# Patient Record
Sex: MALE | Race: OTHER | Employment: FULL TIME
[De-identification: names, ages, dates, MRNs, and addresses within clinical notes are randomized per-mention and may not be internally consistent; named-entity substitution may affect disease eponyms.]

---

## 2017-01-10 DIAGNOSIS — F98.8 ADD (ATTENTION DEFICIT DISORDER): ICD-10-CM

## 2017-01-11 RX ORDER — DEXTROAMPHETAMINE SACCHARATE, AMPHETAMINE ASPARTATE MONOHYDRATE, DEXTROAMPHETAMINE SULFATE AND AMPHETAMINE SULFATE 5; 5; 5; 5 MG/1; MG/1; MG/1; MG/1
20 CAPSULE, EXTENDED RELEASE ORAL EVERY MORNING
Qty: 30 CAPSULE | Refills: 0 | Status: SHIPPED | OUTPATIENT
Start: 2017-01-11 | End: 2017-02-13 | Stop reason: SDUPTHER

## 2017-02-08 ENCOUNTER — OFFICE VISIT (OUTPATIENT)
Facility: CLINIC | Age: 37
End: 2017-02-08

## 2017-02-08 VITALS
DIASTOLIC BLOOD PRESSURE: 80 MMHG | OXYGEN SATURATION: 99 % | BODY MASS INDEX: 22.48 KG/M2 | SYSTOLIC BLOOD PRESSURE: 116 MMHG | TEMPERATURE: 98.7 F | HEIGHT: 69 IN | HEART RATE: 81 BPM | WEIGHT: 151.8 LBS

## 2017-02-08 DIAGNOSIS — R05.9 COUGH: ICD-10-CM

## 2017-02-08 DIAGNOSIS — R09.81 NASAL CONGESTION WITH RHINORRHEA: Primary | ICD-10-CM

## 2017-02-08 DIAGNOSIS — J34.89 NASAL CONGESTION WITH RHINORRHEA: Primary | ICD-10-CM

## 2017-02-08 PROCEDURE — G8420 CALC BMI NORM PARAMETERS: HCPCS | Performed by: PHYSICIAN ASSISTANT

## 2017-02-08 PROCEDURE — G8484 FLU IMMUNIZE NO ADMIN: HCPCS | Performed by: PHYSICIAN ASSISTANT

## 2017-02-08 PROCEDURE — 1036F TOBACCO NON-USER: CPT | Performed by: PHYSICIAN ASSISTANT

## 2017-02-08 PROCEDURE — 99213 OFFICE O/P EST LOW 20 MIN: CPT | Performed by: PHYSICIAN ASSISTANT

## 2017-02-08 PROCEDURE — G8427 DOCREV CUR MEDS BY ELIG CLIN: HCPCS | Performed by: PHYSICIAN ASSISTANT

## 2017-02-08 RX ORDER — CETIRIZINE HYDROCHLORIDE 10 MG/1
10 TABLET ORAL DAILY
Qty: 30 TABLET | Refills: 1 | Status: SHIPPED | OUTPATIENT
Start: 2017-02-08 | End: 2017-04-18 | Stop reason: SDUPTHER

## 2017-02-08 RX ORDER — FLUTICASONE PROPIONATE 50 MCG
1 SPRAY, SUSPENSION (ML) NASAL DAILY
Qty: 1 BOTTLE | Refills: 3 | Status: SHIPPED | OUTPATIENT
Start: 2017-02-08 | End: 2017-04-18 | Stop reason: SDUPTHER

## 2017-02-08 RX ORDER — BENZONATATE 100 MG/1
100 CAPSULE ORAL 3 TIMES DAILY PRN
Qty: 30 CAPSULE | Refills: 0 | Status: SHIPPED | OUTPATIENT
Start: 2017-02-08 | End: 2017-02-15

## 2017-02-08 ASSESSMENT — ENCOUNTER SYMPTOMS
SHORTNESS OF BREATH: 0
CHEST TIGHTNESS: 0
DIARRHEA: 0
PHOTOPHOBIA: 0
RHINORRHEA: 1
SORE THROAT: 0
VOMITING: 0
NAUSEA: 0
COUGH: 1

## 2017-02-13 DIAGNOSIS — F98.8 ADD (ATTENTION DEFICIT DISORDER): ICD-10-CM

## 2017-02-13 RX ORDER — DEXTROAMPHETAMINE SACCHARATE, AMPHETAMINE ASPARTATE MONOHYDRATE, DEXTROAMPHETAMINE SULFATE AND AMPHETAMINE SULFATE 5; 5; 5; 5 MG/1; MG/1; MG/1; MG/1
20 CAPSULE, EXTENDED RELEASE ORAL EVERY MORNING
Qty: 30 CAPSULE | Refills: 0 | Status: SHIPPED | OUTPATIENT
Start: 2017-02-13 | End: 2017-04-18 | Stop reason: SDUPTHER

## 2017-03-01 ENCOUNTER — OFFICE VISIT (OUTPATIENT)
Facility: CLINIC | Age: 37
End: 2017-03-01

## 2017-03-01 VITALS
WEIGHT: 155 LBS | DIASTOLIC BLOOD PRESSURE: 68 MMHG | HEIGHT: 69 IN | TEMPERATURE: 97.7 F | SYSTOLIC BLOOD PRESSURE: 124 MMHG | BODY MASS INDEX: 22.96 KG/M2

## 2017-03-01 DIAGNOSIS — J34.89 NASAL CONGESTION WITH RHINORRHEA: ICD-10-CM

## 2017-03-01 DIAGNOSIS — F98.8 ADD (ATTENTION DEFICIT DISORDER): Primary | ICD-10-CM

## 2017-03-01 DIAGNOSIS — R09.81 NASAL CONGESTION WITH RHINORRHEA: ICD-10-CM

## 2017-03-01 DIAGNOSIS — H69.83 ETD (EUSTACHIAN TUBE DYSFUNCTION), BILATERAL: ICD-10-CM

## 2017-03-01 PROCEDURE — G8420 CALC BMI NORM PARAMETERS: HCPCS | Performed by: NURSE PRACTITIONER

## 2017-03-01 PROCEDURE — G8484 FLU IMMUNIZE NO ADMIN: HCPCS | Performed by: NURSE PRACTITIONER

## 2017-03-01 PROCEDURE — G8427 DOCREV CUR MEDS BY ELIG CLIN: HCPCS | Performed by: NURSE PRACTITIONER

## 2017-03-01 PROCEDURE — 1036F TOBACCO NON-USER: CPT | Performed by: NURSE PRACTITIONER

## 2017-03-01 PROCEDURE — 99214 OFFICE O/P EST MOD 30 MIN: CPT | Performed by: NURSE PRACTITIONER

## 2017-03-01 RX ORDER — CETIRIZINE HYDROCHLORIDE, PSEUDOEPHEDRINE HYDROCHLORIDE 5; 120 MG/1; MG/1
1 TABLET, FILM COATED, EXTENDED RELEASE ORAL 2 TIMES DAILY
Qty: 60 TABLET | Refills: 3 | Status: SHIPPED | OUTPATIENT
Start: 2017-03-01 | End: 2017-10-17 | Stop reason: SDUPTHER

## 2017-03-01 RX ORDER — DEXTROAMPHETAMINE SACCHARATE, AMPHETAMINE ASPARTATE MONOHYDRATE, DEXTROAMPHETAMINE SULFATE AND AMPHETAMINE SULFATE 5; 5; 5; 5 MG/1; MG/1; MG/1; MG/1
20 CAPSULE, EXTENDED RELEASE ORAL EVERY MORNING
Qty: 30 CAPSULE | Refills: 0 | Status: CANCELLED | OUTPATIENT
Start: 2017-03-01 | End: 2017-03-31

## 2017-03-01 RX ORDER — CETIRIZINE HYDROCHLORIDE 10 MG/1
10 TABLET ORAL DAILY
Qty: 30 TABLET | Refills: 1 | Status: CANCELLED | OUTPATIENT
Start: 2017-03-01

## 2017-03-01 ASSESSMENT — ENCOUNTER SYMPTOMS
WHEEZING: 0
CHEST TIGHTNESS: 0
SORE THROAT: 0
COUGH: 0

## 2018-06-28 ENCOUNTER — HOSPITAL ENCOUNTER (OUTPATIENT)
Age: 38
Setting detail: SPECIMEN
Discharge: HOME | End: 2018-06-28
Payer: COMMERCIAL

## 2018-06-28 DIAGNOSIS — R53.83 FATIGUE, UNSPECIFIED TYPE: ICD-10-CM

## 2018-06-28 DIAGNOSIS — M54.2 ANTERIOR NECK PAIN: ICD-10-CM

## 2018-06-28 DIAGNOSIS — Z79.899 ENCOUNTER FOR MEDICATION MANAGEMENT: ICD-10-CM

## 2018-06-28 LAB
AMPHETAMINE SCREEN URINE: NEGATIVE
BARBITURATE SCREEN URINE: NEGATIVE
BENZODIAZEPINE SCREEN, URINE: NEGATIVE
BUPRENORPHINE URINE: NORMAL
CANNABINOID SCREEN URINE: NEGATIVE
COCAINE METABOLITE, URINE: NEGATIVE
MDMA URINE: NORMAL
METHADONE SCREEN, URINE: NEGATIVE
METHAMPHETAMINE, URINE: NORMAL
OPIATES, URINE: NEGATIVE
OXYCODONE SCREEN URINE: NEGATIVE
PHENCYCLIDINE, URINE: NEGATIVE
PROPOXYPHENE, URINE: NORMAL
TEST INFORMATION: NORMAL
THYROXINE, FREE: 1.03 NG/DL (ref 0.93–1.7)
TRICYCLIC ANTIDEPRESSANTS, UR: NORMAL
TSH SERPL DL<=0.05 MIU/L-ACNC: 1.43 MIU/L (ref 0.3–5)

## 2018-06-29 LAB
THYROGLOBULIN AB: <20 IU/ML (ref 0–40)
THYROID PEROXIDASE (TPO) AB: <10 IU/ML (ref 0–35)

## 2019-05-14 ENCOUNTER — HOSPITAL ENCOUNTER (OUTPATIENT)
Age: 39
Setting detail: SPECIMEN
Discharge: HOME | End: 2019-05-14
Payer: COMMERCIAL

## 2019-05-14 DIAGNOSIS — R63.4 UNEXPLAINED WEIGHT LOSS: ICD-10-CM

## 2019-05-14 DIAGNOSIS — J02.9 ACUTE PHARYNGITIS, UNSPECIFIED ETIOLOGY: ICD-10-CM

## 2019-05-14 LAB
ABSOLUTE EOS #: 0.12 K/UL (ref 0–0.44)
ABSOLUTE IMMATURE GRANULOCYTE: <0.03 K/UL (ref 0–0.3)
ABSOLUTE LYMPH #: 1.39 K/UL (ref 1.1–3.7)
ABSOLUTE MONO #: 0.25 K/UL (ref 0.1–1.2)
ALBUMIN SERPL-MCNC: 4.7 G/DL (ref 3.5–5.2)
ALBUMIN/GLOBULIN RATIO: 1.8 (ref 1–2.5)
ALP BLD-CCNC: 68 U/L (ref 40–129)
ALT SERPL-CCNC: 28 U/L (ref 5–41)
ANION GAP SERPL CALCULATED.3IONS-SCNC: 16 MMOL/L (ref 9–17)
AST SERPL-CCNC: 23 U/L
BASOPHILS # BLD: 0 % (ref 0–2)
BASOPHILS ABSOLUTE: <0.03 K/UL (ref 0–0.2)
BILIRUB SERPL-MCNC: 0.28 MG/DL (ref 0.3–1.2)
BUN BLDV-MCNC: 15 MG/DL (ref 6–20)
BUN/CREAT BLD: ABNORMAL (ref 9–20)
CALCIUM SERPL-MCNC: 9.1 MG/DL (ref 8.6–10.4)
CHLORIDE BLD-SCNC: 101 MMOL/L (ref 98–107)
CO2: 27 MMOL/L (ref 20–31)
CREAT SERPL-MCNC: 0.67 MG/DL (ref 0.7–1.2)
DIFFERENTIAL TYPE: ABNORMAL
EOSINOPHILS RELATIVE PERCENT: 2 % (ref 1–4)
GFR AFRICAN AMERICAN: >60 ML/MIN
GFR NON-AFRICAN AMERICAN: >60 ML/MIN
GFR SERPL CREATININE-BSD FRML MDRD: ABNORMAL ML/MIN/{1.73_M2}
GFR SERPL CREATININE-BSD FRML MDRD: ABNORMAL ML/MIN/{1.73_M2}
GLUCOSE BLD-MCNC: 76 MG/DL (ref 70–99)
HCT VFR BLD CALC: 48.3 % (ref 40.7–50.3)
HEMOGLOBIN: 16 G/DL (ref 13–17)
IMMATURE GRANULOCYTES: 0 %
LYMPHOCYTES # BLD: 27 % (ref 24–43)
MCH RBC QN AUTO: 32.3 PG (ref 25.2–33.5)
MCHC RBC AUTO-ENTMCNC: 33.1 G/DL (ref 28.4–34.8)
MCV RBC AUTO: 97.4 FL (ref 82.6–102.9)
MONOCYTES # BLD: 5 % (ref 3–12)
MONONUCLEOSIS SCREEN: NEGATIVE
NRBC AUTOMATED: 0 PER 100 WBC
PDW BLD-RTO: 11.9 % (ref 11.8–14.4)
PLATELET # BLD: 241 K/UL (ref 138–453)
PLATELET ESTIMATE: ABNORMAL
PMV BLD AUTO: 10.1 FL (ref 8.1–13.5)
POTASSIUM SERPL-SCNC: 4.5 MMOL/L (ref 3.7–5.3)
RBC # BLD: 4.96 M/UL (ref 4.21–5.77)
RBC # BLD: ABNORMAL 10*6/UL
SEG NEUTROPHILS: 66 % (ref 36–65)
SEGMENTED NEUTROPHILS ABSOLUTE COUNT: 3.43 K/UL (ref 1.5–8.1)
SODIUM BLD-SCNC: 144 MMOL/L (ref 135–144)
THYROXINE, FREE: 1.28 NG/DL (ref 0.93–1.7)
TOTAL PROTEIN: 7.3 G/DL (ref 6.4–8.3)
TSH SERPL DL<=0.05 MIU/L-ACNC: 1.6 MIU/L (ref 0.3–5)
WBC # BLD: 5.2 K/UL (ref 3.5–11.3)
WBC # BLD: ABNORMAL 10*3/UL

## 2019-05-15 LAB
ANA REFERENCE RANGE:: ABNORMAL
ANTI DNA DOUBLE STRANDED: 67 IU/ML
ANTI JO-1 IGG: 7 U/ML
ANTI RNP AB: 48 U/ML
ANTI SSA: 201 U/ML
ANTI SSB: 14 U/ML
ANTI-CENTROMERE: 16 U/ML
ANTI-NUCLEAR ANTIBODY (ANA): POSITIVE
ANTI-SCLERODERMA: 22 U/ML
ANTI-SMITH: 28 U/ML
HISTONE ANTIBODY: 22 U/ML

## 2020-06-10 ENCOUNTER — HOSPITAL ENCOUNTER (OUTPATIENT)
Age: 40
Setting detail: SPECIMEN
Discharge: HOME | End: 2020-06-10
Payer: COMMERCIAL

## 2020-06-10 LAB
ABSOLUTE EOS #: 0.12 K/UL (ref 0–0.44)
ABSOLUTE IMMATURE GRANULOCYTE: <0.03 K/UL (ref 0–0.3)
ABSOLUTE LYMPH #: 0.74 K/UL (ref 1.1–3.7)
ABSOLUTE MONO #: 0.44 K/UL (ref 0.1–1.2)
BASOPHILS # BLD: 0 % (ref 0–2)
BASOPHILS ABSOLUTE: <0.03 K/UL (ref 0–0.2)
C-REACTIVE PROTEIN: 1 MG/L (ref 0–5)
DIFFERENTIAL TYPE: ABNORMAL
EOSINOPHILS RELATIVE PERCENT: 3 % (ref 1–4)
HCT VFR BLD CALC: 42.8 % (ref 40.7–50.3)
HEMOGLOBIN: 14 G/DL (ref 13–17)
HIV AG/AB: NONREACTIVE
IMMATURE GRANULOCYTES: 0 %
LYMPHOCYTES # BLD: 17 % (ref 24–43)
MCH RBC QN AUTO: 32.7 PG (ref 25.2–33.5)
MCHC RBC AUTO-ENTMCNC: 32.7 G/DL (ref 28.4–34.8)
MCV RBC AUTO: 100 FL (ref 82.6–102.9)
MONOCYTES # BLD: 10 % (ref 3–12)
NRBC AUTOMATED: 0 PER 100 WBC
PDW BLD-RTO: 12.5 % (ref 11.8–14.4)
PLATELET # BLD: 270 K/UL (ref 138–453)
PLATELET ESTIMATE: ABNORMAL
PMV BLD AUTO: 10.2 FL (ref 8.1–13.5)
RBC # BLD: 4.28 M/UL (ref 4.21–5.77)
RBC # BLD: ABNORMAL 10*6/UL
SEDIMENTATION RATE, ERYTHROCYTE: 1 MM (ref 0–15)
SEG NEUTROPHILS: 70 % (ref 36–65)
SEGMENTED NEUTROPHILS ABSOLUTE COUNT: 3.12 K/UL (ref 1.5–8.1)
THYROXINE, FREE: 1.44 NG/DL (ref 0.93–1.7)
TSH SERPL DL<=0.05 MIU/L-ACNC: 1.7 MIU/L (ref 0.3–5)
WBC # BLD: 4.4 K/UL (ref 3.5–11.3)
WBC # BLD: ABNORMAL 10*3/UL

## 2020-06-11 ENCOUNTER — TELEMEDICINE (OUTPATIENT)
Dept: PSYCHIATRY | Age: 40
End: 2020-06-11
Payer: COMMERCIAL

## 2020-06-11 LAB
ALBUMIN SERPL-MCNC: 4.3 G/DL (ref 3.5–5.2)
ALBUMIN/GLOBULIN RATIO: 2 (ref 1–2.5)
ALP BLD-CCNC: 60 U/L (ref 40–129)
ALT SERPL-CCNC: 132 U/L (ref 5–41)
ANA REFERENCE RANGE:: ABNORMAL
ANION GAP SERPL CALCULATED.3IONS-SCNC: 18 MMOL/L (ref 9–17)
ANTI DNA DOUBLE STRANDED: 16 IU/ML
ANTI JO-1 IGG: 17 U/ML
ANTI RNP AB: 56 U/ML
ANTI SSA: 211 U/ML
ANTI SSB: 35 U/ML
ANTI-CENTROMERE: 5 U/ML
ANTI-NUCLEAR ANTIBODY (ANA): POSITIVE
ANTI-SCLERODERMA: 17 U/ML
ANTI-SMITH: 36 U/ML
AST SERPL-CCNC: 101 U/L
BILIRUB SERPL-MCNC: 0.64 MG/DL (ref 0.3–1.2)
BUN BLDV-MCNC: 15 MG/DL (ref 6–20)
BUN/CREAT BLD: ABNORMAL (ref 9–20)
CALCIUM SERPL-MCNC: 9.1 MG/DL (ref 8.6–10.4)
CHLORIDE BLD-SCNC: 102 MMOL/L (ref 98–107)
CHOLESTEROL, FASTING: 140 MG/DL
CHOLESTEROL/HDL RATIO: 2.5
CO2: 21 MMOL/L (ref 20–31)
CREAT SERPL-MCNC: 0.79 MG/DL (ref 0.7–1.2)
GFR AFRICAN AMERICAN: >60 ML/MIN
GFR NON-AFRICAN AMERICAN: >60 ML/MIN
GFR SERPL CREATININE-BSD FRML MDRD: ABNORMAL ML/MIN/{1.73_M2}
GFR SERPL CREATININE-BSD FRML MDRD: ABNORMAL ML/MIN/{1.73_M2}
GLUCOSE BLD-MCNC: 80 MG/DL (ref 70–99)
HDLC SERPL-MCNC: 56 MG/DL
HISTONE ANTIBODY: 29 U/ML
LDL CHOLESTEROL: 74 MG/DL (ref 0–130)
POTASSIUM SERPL-SCNC: 4.6 MMOL/L (ref 3.7–5.3)
SODIUM BLD-SCNC: 141 MMOL/L (ref 135–144)
TOTAL PROTEIN: 6.5 G/DL (ref 6.4–8.3)
TRIGLYCERIDE, FASTING: 49 MG/DL
VLDLC SERPL CALC-MCNC: NORMAL MG/DL (ref 1–30)

## 2020-06-11 PROCEDURE — G8427 DOCREV CUR MEDS BY ELIG CLIN: HCPCS | Performed by: PSYCHIATRY & NEUROLOGY

## 2020-06-11 PROCEDURE — 99205 OFFICE O/P NEW HI 60 MIN: CPT | Performed by: PSYCHIATRY & NEUROLOGY
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
## 2020-06-25 ENCOUNTER — HOSPITAL ENCOUNTER (OUTPATIENT)
Dept: ULTRASOUND IMAGING | Age: 40
Discharge: HOME | End: 2020-06-27
Payer: COMMERCIAL

## 2020-06-25 ENCOUNTER — HOSPITAL ENCOUNTER (OUTPATIENT)
Age: 40
Setting detail: SPECIMEN
Discharge: HOME | End: 2020-06-25
Payer: COMMERCIAL

## 2020-06-25 LAB
ALBUMIN SERPL-MCNC: 4.4 G/DL (ref 3.5–5.2)
ALBUMIN/GLOBULIN RATIO: 2.1 (ref 1–2.5)
ALP BLD-CCNC: 77 U/L (ref 40–129)
ALT SERPL-CCNC: 41 U/L (ref 5–41)
AST SERPL-CCNC: 30 U/L
BILIRUB SERPL-MCNC: 0.42 MG/DL (ref 0.3–1.2)
BILIRUBIN DIRECT: 0.11 MG/DL
BILIRUBIN, INDIRECT: 0.31 MG/DL (ref 0–1)
FERRITIN: 305 UG/L (ref 30–400)
GGT: 21 U/L (ref 8–61)
GLOBULIN: NORMAL G/DL (ref 1.5–3.8)
HAV IGM SER IA-ACNC: NONREACTIVE
HEPATITIS B CORE IGM ANTIBODY: NONREACTIVE
HEPATITIS B SURFACE ANTIGEN: NONREACTIVE
HEPATITIS C ANTIBODY: NONREACTIVE
IRON: 102 UG/DL (ref 59–158)
TOTAL PROTEIN: 6.5 G/DL (ref 6.4–8.3)

## 2020-06-25 PROCEDURE — 76705 ECHO EXAM OF ABDOMEN: CPT

## 2020-06-27 LAB — LACTATE DEHYDROGENASE: 340 U/L (ref 135–225)

## 2021-03-26 ENCOUNTER — APPOINTMENT (OUTPATIENT)
Dept: GENERAL RADIOLOGY | Age: 41
DRG: 904 | End: 2021-03-26
Payer: COMMERCIAL

## 2021-03-26 ENCOUNTER — ANESTHESIA (OUTPATIENT)
Dept: OPERATING ROOM | Age: 41
DRG: 904 | End: 2021-03-26
Payer: COMMERCIAL

## 2021-03-26 ENCOUNTER — HOSPITAL ENCOUNTER (INPATIENT)
Age: 41
LOS: 27 days | Discharge: PSYCHIATRIC OR SUBSTANCE USE FACILITY | DRG: 904 | End: 2021-04-22
Attending: EMERGENCY MEDICINE | Admitting: SURGERY
Payer: COMMERCIAL

## 2021-03-26 ENCOUNTER — ANESTHESIA EVENT (OUTPATIENT)
Dept: OPERATING ROOM | Age: 41
DRG: 904 | End: 2021-03-26
Payer: COMMERCIAL

## 2021-03-26 VITALS — OXYGEN SATURATION: 100 % | TEMPERATURE: 94.8 F | DIASTOLIC BLOOD PRESSURE: 73 MMHG | SYSTOLIC BLOOD PRESSURE: 99 MMHG
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
## 2021-03-26 DIAGNOSIS — S54.12XA LACERATION OF LEFT MEDIAN NERVE, INITIAL ENCOUNTER: ICD-10-CM

## 2021-03-26 PROBLEM — T14.90XA TRAUMA: Status: ACTIVE | Noted: 2021-03-26

## 2021-03-26 LAB
-: NORMAL
ABO/RH: NORMAL
ABSOLUTE EOS #: 0 K/UL (ref 0–0.4)
ABSOLUTE IMMATURE GRANULOCYTE: 0 K/UL (ref 0–0.3)
ABSOLUTE LYMPH #: 1.24 K/UL (ref 1–4.8)
ABSOLUTE MONO #: 0 K/UL (ref 0.1–0.8)
ALLEN TEST: ABNORMAL
AMPHETAMINE SCREEN URINE: NEGATIVE
ANION GAP SERPL CALCULATED.3IONS-SCNC: 18 MMOL/L (ref 9–17)
ANION GAP SERPL CALCULATED.3IONS-SCNC: 9 MMOL/L (ref 9–17)
ANTIBODY SCREEN: NEGATIVE
ARM BAND NUMBER: NORMAL
BARBITURATE SCREEN URINE: NEGATIVE
BASOPHILS # BLD: 0 % (ref 0–2)
BASOPHILS ABSOLUTE: 0 K/UL (ref 0–0.2)
BENZODIAZEPINE SCREEN, URINE: NEGATIVE
BILIRUBIN URINE: NEGATIVE
BLOOD BANK SPECIMEN: ABNORMAL
BUN BLDV-MCNC: 22 MG/DL (ref 6–20)
BUN BLDV-MCNC: 26 MG/DL (ref 6–20)
BUN/CREAT BLD: ABNORMAL (ref 9–20)
BUPRENORPHINE URINE: ABNORMAL
CALCIUM SERPL-MCNC: 8 MG/DL (ref 8.6–10.4)
CANNABINOID SCREEN URINE: POSITIVE
CARBOXYHEMOGLOBIN: 0.4 % (ref 0–5)
CHLORIDE BLD-SCNC: 103 MMOL/L (ref 98–107)
CHLORIDE BLD-SCNC: 106 MMOL/L (ref 98–107)
CO2: 17 MMOL/L (ref 20–31)
CO2: 23 MMOL/L (ref 20–31)
COCAINE METABOLITE, URINE: NEGATIVE
COLOR: YELLOW
COMMENT UA: ABNORMAL
CREAT SERPL-MCNC: 0.72 MG/DL (ref 0.7–1.2)
CREAT SERPL-MCNC: 1 MG/DL (ref 0.7–1.2)
DIFFERENTIAL TYPE: ABNORMAL
EOSINOPHILS RELATIVE PERCENT: 0 % (ref 1–4)
ETHANOL PERCENT: <0.01 %
ETHANOL: <10 MG/DL
EXPIRATION DATE: NORMAL
FIO2: ABNORMAL
GFR AFRICAN AMERICAN: >60 ML/MIN
GFR AFRICAN AMERICAN: ABNORMAL ML/MIN
GFR NON-AFRICAN AMERICAN: >60 ML/MIN
GFR NON-AFRICAN AMERICAN: ABNORMAL ML/MIN
GFR SERPL CREATININE-BSD FRML MDRD: ABNORMAL ML/MIN/{1.73_M2}
GLUCOSE BLD-MCNC: 109 MG/DL (ref 70–99)
GLUCOSE BLD-MCNC: 169 MG/DL (ref 70–99)
GLUCOSE URINE: NEGATIVE
HCG QUALITATIVE: ABNORMAL
HCO3 VENOUS: 18.8 MMOL/L (ref 24–30)
HCT VFR BLD CALC: 36.3 % (ref 40.7–50.3)
HCT VFR BLD CALC: 41.7 % (ref 40.7–50.3)
HEMOGLOBIN: 12.5 G/DL (ref 13–17)
HEMOGLOBIN: 13.8 G/DL (ref 13–17)
IMMATURE GRANULOCYTES: 0 %
INR BLD: 1.1
KETONES, URINE: ABNORMAL
LEUKOCYTE ESTERASE, URINE: NEGATIVE
LYMPHOCYTES # BLD: 8 % (ref 24–44)
MAGNESIUM: 2.4 MG/DL (ref 1.6–2.6)
MCH RBC QN AUTO: 32.2 PG (ref 25.2–33.5)
MCH RBC QN AUTO: 32.6 PG (ref 25.2–33.5)
MCHC RBC AUTO-ENTMCNC: 33.1 G/DL (ref 28.4–34.8)
MCHC RBC AUTO-ENTMCNC: 34.4 G/DL (ref 28.4–34.8)
MCV RBC AUTO: 94.8 FL (ref 82.6–102.9)
MCV RBC AUTO: 97.4 FL (ref 82.6–102.9)
MDMA URINE: ABNORMAL
METHADONE SCREEN, URINE: NEGATIVE
METHAMPHETAMINE, URINE: ABNORMAL
METHEMOGLOBIN: ABNORMAL % (ref 0–1.5)
MODE: ABNORMAL
MONOCYTES # BLD: 0 % (ref 1–7)
MORPHOLOGY: NORMAL
NEGATIVE BASE EXCESS, VEN: 6.8 MMOL/L (ref 0–2)
NITRITE, URINE: NEGATIVE
NOTIFICATION TIME: ABNORMAL
NOTIFICATION: ABNORMAL
NRBC AUTOMATED: 0 PER 100 WBC
NRBC AUTOMATED: 0 PER 100 WBC
O2 DEVICE/FLOW/%: ABNORMAL
O2 SAT, VEN: 58.4 % (ref 60–85)
OPIATES, URINE: NEGATIVE
OXYCODONE SCREEN URINE: POSITIVE
OXYHEMOGLOBIN: ABNORMAL % (ref 95–98)
PARTIAL THROMBOPLASTIN TIME: 19.9 SEC (ref 20.5–30.5)
PARTIAL THROMBOPLASTIN TIME: 36.9 SEC (ref 20.5–30.5)
PATIENT TEMP: 37
PCO2, VEN, TEMP ADJ: ABNORMAL MMHG (ref 39–55)
PCO2, VEN: 39.8 (ref 39–55)
PDW BLD-RTO: 11.9 % (ref 11.8–14.4)
PDW BLD-RTO: 12.1 % (ref 11.8–14.4)
PEEP/CPAP: ABNORMAL
PH UA: 5.5 (ref 5–8)
PH VENOUS: 7.29 (ref 7.32–7.42)
PH, VEN, TEMP ADJ: ABNORMAL (ref 7.32–7.42)
PHENCYCLIDINE, URINE: NEGATIVE
PHOSPHORUS: 3.5 MG/DL (ref 2.5–4.5)
PLATELET # BLD: 252 K/UL (ref 138–453)
PLATELET # BLD: 281 K/UL (ref 138–453)
PLATELET ESTIMATE: ABNORMAL
PMV BLD AUTO: 9.1 FL (ref 8.1–13.5)
PMV BLD AUTO: 9.6 FL (ref 8.1–13.5)
PO2, VEN, TEMP ADJ: ABNORMAL MMHG (ref 30–50)
PO2, VEN: 35.9 (ref 30–50)
POSITIVE BASE EXCESS, VEN: ABNORMAL MMOL/L (ref 0–2)
POTASSIUM SERPL-SCNC: 3.8 MMOL/L (ref 3.7–5.3)
POTASSIUM SERPL-SCNC: 4.5 MMOL/L (ref 3.7–5.3)
PROPOXYPHENE, URINE: ABNORMAL
PROTEIN UA: NEGATIVE
PROTHROMBIN TIME: 11.4 SEC (ref 9.1–12.3)
PSV: ABNORMAL
PT. POSITION: ABNORMAL
RBC # BLD: 3.83 M/UL (ref 4.21–5.77)
RBC # BLD: 4.28 M/UL (ref 4.21–5.77)
RBC # BLD: ABNORMAL 10*6/UL
REASON FOR REJECTION: NORMAL
RESPIRATORY RATE: ABNORMAL
SAMPLE SITE: ABNORMAL
SARS-COV-2, RAPID: NOT DETECTED
SEG NEUTROPHILS: 92 % (ref 36–66)
SEGMENTED NEUTROPHILS ABSOLUTE COUNT: 14.26 K/UL (ref 1.8–7.7)
SET RATE: ABNORMAL
SODIUM BLD-SCNC: 138 MMOL/L (ref 135–144)
SODIUM BLD-SCNC: 138 MMOL/L (ref 135–144)
SPECIFIC GRAVITY UA: 1.02 (ref 1–1.03)
SPECIMEN DESCRIPTION: NORMAL
TEST INFORMATION: ABNORMAL
TEXT FOR RESPIRATORY: ABNORMAL
TOTAL HB: ABNORMAL G/DL (ref 12–16)
TOTAL RATE: ABNORMAL
TRICYCLIC ANTIDEPRESSANTS, UR: ABNORMAL
TURBIDITY: CLEAR
URINE HGB: NEGATIVE
UROBILINOGEN, URINE: NORMAL
VT: ABNORMAL
WBC # BLD: 15.5 K/UL (ref 3.5–11.3)
WBC # BLD: 7 K/UL (ref 3.5–11.3)
WBC # BLD: ABNORMAL 10*3/UL
ZZ NTE CLEAN UP: ORDERED TEST: NORMAL
ZZ NTE WITH NAME CLEAN UP: SPECIMEN SOURCE: NORMAL

## 2021-03-26 PROCEDURE — 0KQC3ZZ REPAIR RIGHT HAND MUSCLE, PERCUTANEOUS APPROACH: ICD-10-PCS | Performed by: STUDENT IN AN ORGANIZED HEALTH CARE EDUCATION/TRAINING PROGRAM

## 2021-03-26 PROCEDURE — 84703 CHORIONIC GONADOTROPIN ASSAY: CPT

## 2021-03-26 PROCEDURE — 86850 RBC ANTIBODY SCREEN: CPT

## 2021-03-26 PROCEDURE — 2580000003 HC RX 258: Performed by: SURGERY

## 2021-03-26 PROCEDURE — 2500000003 HC RX 250 WO HCPCS: Performed by: NURSE ANESTHETIST, CERTIFIED REGISTERED

## 2021-03-26 PROCEDURE — G0480 DRUG TEST DEF 1-7 CLASSES: HCPCS

## 2021-03-26 PROCEDURE — 2500000003 HC RX 250 WO HCPCS: Performed by: STUDENT IN AN ORGANIZED HEALTH CARE EDUCATION/TRAINING PROGRAM

## 2021-03-26 PROCEDURE — 0KBB0ZZ EXCISION OF LEFT LOWER ARM AND WRIST MUSCLE, OPEN APPROACH: ICD-10-PCS | Performed by: PLASTIC SURGERY

## 2021-03-26 PROCEDURE — 80051 ELECTROLYTE PANEL: CPT

## 2021-03-26 PROCEDURE — 84100 ASSAY OF PHOSPHORUS: CPT

## 2021-03-26 PROCEDURE — 87635 SARS-COV-2 COVID-19 AMP PRB: CPT

## 2021-03-26 PROCEDURE — 6360000002 HC RX W HCPCS: Performed by: SURGERY

## 2021-03-26 PROCEDURE — 85025 COMPLETE CBC W/AUTO DIFF WBC: CPT

## 2021-03-26 PROCEDURE — 0KNB0ZZ RELEASE LEFT LOWER ARM AND WRIST MUSCLE, OPEN APPROACH: ICD-10-PCS | Performed by: ORTHOPAEDIC SURGERY

## 2021-03-26 PROCEDURE — 6360000002 HC RX W HCPCS: Performed by: STUDENT IN AN ORGANIZED HEALTH CARE EDUCATION/TRAINING PROGRAM

## 2021-03-26 PROCEDURE — 73080 X-RAY EXAM OF ELBOW: CPT

## 2021-03-26 PROCEDURE — 73130 X-RAY EXAM OF HAND: CPT

## 2021-03-26 PROCEDURE — 86901 BLOOD TYPING SEROLOGIC RH(D): CPT

## 2021-03-26 PROCEDURE — 2500000003 HC RX 250 WO HCPCS: Performed by: SURGERY

## 2021-03-26 PROCEDURE — 3600000005 HC SURGERY LEVEL 5 BASE: Performed by: SURGERY

## 2021-03-26 PROCEDURE — 99284 EMERGENCY DEPT VISIT MOD MDM: CPT

## 2021-03-26 PROCEDURE — 85730 THROMBOPLASTIN TIME PARTIAL: CPT

## 2021-03-26 PROCEDURE — 82947 ASSAY GLUCOSE BLOOD QUANT: CPT

## 2021-03-26 PROCEDURE — 6370000000 HC RX 637 (ALT 250 FOR IP): Performed by: STUDENT IN AN ORGANIZED HEALTH CARE EDUCATION/TRAINING PROGRAM

## 2021-03-26 PROCEDURE — 6370000000 HC RX 637 (ALT 250 FOR IP): Performed by: SURGERY

## 2021-03-26 PROCEDURE — 81003 URINALYSIS AUTO W/O SCOPE: CPT

## 2021-03-26 PROCEDURE — 0X3F0ZZ CONTROL BLEEDING IN LEFT LOWER ARM, OPEN APPROACH: ICD-10-PCS | Performed by: SURGERY

## 2021-03-26 PROCEDURE — 01Q60ZZ REPAIR RADIAL NERVE, OPEN APPROACH: ICD-10-PCS | Performed by: ORTHOPAEDIC SURGERY

## 2021-03-26 PROCEDURE — 6360000002 HC RX W HCPCS: Performed by: ANESTHESIOLOGY

## 2021-03-26 PROCEDURE — 2709999900 HC NON-CHARGEABLE SUPPLY: Performed by: SURGERY

## 2021-03-26 PROCEDURE — 82805 BLOOD GASES W/O2 SATURATION: CPT

## 2021-03-26 PROCEDURE — 2580000003 HC RX 258: Performed by: STUDENT IN AN ORGANIZED HEALTH CARE EDUCATION/TRAINING PROGRAM

## 2021-03-26 PROCEDURE — 80307 DRUG TEST PRSMV CHEM ANLYZR: CPT

## 2021-03-26 PROCEDURE — 85610 PROTHROMBIN TIME: CPT

## 2021-03-26 PROCEDURE — 83735 ASSAY OF MAGNESIUM: CPT

## 2021-03-26 PROCEDURE — 2580000003 HC RX 258: Performed by: NURSE ANESTHETIST, CERTIFIED REGISTERED

## 2021-03-26 PROCEDURE — 86900 BLOOD TYPING SEROLOGIC ABO: CPT

## 2021-03-26 PROCEDURE — 7100000000 HC PACU RECOVERY - FIRST 15 MIN: Performed by: SURGERY

## 2021-03-26 PROCEDURE — 94761 N-INVAS EAR/PLS OXIMETRY MLT: CPT

## 2021-03-26 PROCEDURE — 7100000001 HC PACU RECOVERY - ADDTL 15 MIN: Performed by: SURGERY

## 2021-03-26 PROCEDURE — 84520 ASSAY OF UREA NITROGEN: CPT

## 2021-03-26 PROCEDURE — 3700000001 HC ADD 15 MINUTES (ANESTHESIA): Performed by: SURGERY

## 2021-03-26 PROCEDURE — 0HREX74 REPLACEMENT OF LEFT LOWER ARM SKIN WITH AUTOLOGOUS TISSUE SUBSTITUTE, PARTIAL THICKNESS, EXTERNAL APPROACH: ICD-10-PCS | Performed by: PLASTIC SURGERY

## 2021-03-26 PROCEDURE — 71045 X-RAY EXAM CHEST 1 VIEW: CPT

## 2021-03-26 PROCEDURE — 0HBJXZZ EXCISION OF LEFT UPPER LEG SKIN, EXTERNAL APPROACH: ICD-10-PCS | Performed by: PLASTIC SURGERY

## 2021-03-26 PROCEDURE — 2700000000 HC OXYGEN THERAPY PER DAY

## 2021-03-26 PROCEDURE — 87641 MR-STAPH DNA AMP PROBE: CPT

## 2021-03-26 PROCEDURE — 85027 COMPLETE CBC AUTOMATED: CPT

## 2021-03-26 PROCEDURE — 3700000000 HC ANESTHESIA ATTENDED CARE: Performed by: SURGERY

## 2021-03-26 PROCEDURE — 80048 BASIC METABOLIC PNL TOTAL CA: CPT

## 2021-03-26 PROCEDURE — 82565 ASSAY OF CREATININE: CPT

## 2021-03-26 PROCEDURE — 73090 X-RAY EXAM OF FOREARM: CPT

## 2021-03-26 PROCEDURE — 2000000000 HC ICU R&B

## 2021-03-26 PROCEDURE — 6360000002 HC RX W HCPCS: Performed by: NURSE ANESTHETIST, CERTIFIED REGISTERED

## 2021-03-26 PROCEDURE — 6810039000 HC L1 TRAUMA ALERT

## 2021-03-26 PROCEDURE — 93005 ELECTROCARDIOGRAM TRACING: CPT | Performed by: STUDENT IN AN ORGANIZED HEALTH CARE EDUCATION/TRAINING PROGRAM

## 2021-03-26 PROCEDURE — 01Q50ZZ REPAIR MEDIAN NERVE, OPEN APPROACH: ICD-10-PCS | Performed by: ORTHOPAEDIC SURGERY

## 2021-03-26 PROCEDURE — 3600000015 HC SURGERY LEVEL 5 ADDTL 15MIN: Performed by: SURGERY

## 2021-03-26 PROCEDURE — 36415 COLL VENOUS BLD VENIPUNCTURE: CPT

## 2021-03-26 RX ORDER — HEPARIN SODIUM 1000 [USP'U]/ML
INJECTION, SOLUTION INTRAVENOUS; SUBCUTANEOUS PRN
Status: DISCONTINUED | OUTPATIENT
Start: 2021-03-26 | End: 2021-03-26 | Stop reason: SDUPTHER

## 2021-03-26 RX ORDER — MAGNESIUM HYDROXIDE 1200 MG/15ML
LIQUID ORAL CONTINUOUS PRN
Status: COMPLETED | OUTPATIENT
Start: 2021-03-26 | End: 2021-03-26

## 2021-03-26 RX ORDER — HEPARIN SODIUM 1000 [USP'U]/ML
60 INJECTION, SOLUTION INTRAVENOUS; SUBCUTANEOUS ONCE
Status: DISCONTINUED | OUTPATIENT
Start: 2021-03-26 | End: 2021-03-26

## 2021-03-26 RX ORDER — HEPARIN SODIUM 1000 [USP'U]/ML
INJECTION, SOLUTION INTRAVENOUS; SUBCUTANEOUS PRN
Status: DISCONTINUED | OUTPATIENT
Start: 2021-03-26 | End: 2021-03-26 | Stop reason: ALTCHOICE

## 2021-03-26 RX ORDER — SODIUM CHLORIDE, SODIUM LACTATE, POTASSIUM CHLORIDE, CALCIUM CHLORIDE 600; 310; 30; 20 MG/100ML; MG/100ML; MG/100ML; MG/100ML
INJECTION, SOLUTION INTRAVENOUS CONTINUOUS PRN
Status: DISCONTINUED | OUTPATIENT
Start: 2021-03-26 | End: 2021-03-26 | Stop reason: SDUPTHER

## 2021-03-26 RX ORDER — MAGNESIUM SULFATE IN WATER 40 MG/ML
2000 INJECTION, SOLUTION INTRAVENOUS ONCE
Status: COMPLETED | OUTPATIENT
Start: 2021-03-26 | End: 2021-03-26

## 2021-03-26 RX ORDER — ROCURONIUM BROMIDE 10 MG/ML
INJECTION, SOLUTION INTRAVENOUS PRN
Status: DISCONTINUED | OUTPATIENT
Start: 2021-03-26 | End: 2021-03-26 | Stop reason: SDUPTHER

## 2021-03-26 RX ORDER — LABETALOL HYDROCHLORIDE 5 MG/ML
5 INJECTION, SOLUTION INTRAVENOUS EVERY 10 MIN PRN
Status: DISCONTINUED | OUTPATIENT
Start: 2021-03-26 | End: 2021-03-26

## 2021-03-26 RX ORDER — DEXAMETHASONE SODIUM PHOSPHATE 4 MG/ML
INJECTION, SOLUTION INTRA-ARTICULAR; INTRALESIONAL; INTRAMUSCULAR; INTRAVENOUS; SOFT TISSUE PRN
Status: DISCONTINUED | OUTPATIENT
Start: 2021-03-26 | End: 2021-03-26 | Stop reason: SDUPTHER

## 2021-03-26 RX ORDER — HEPARIN SODIUM 1000 [USP'U]/ML
80 INJECTION, SOLUTION INTRAVENOUS; SUBCUTANEOUS PRN
Status: DISCONTINUED | OUTPATIENT
Start: 2021-03-26 | End: 2021-03-27

## 2021-03-26 RX ORDER — SUCCINYLCHOLINE CHLORIDE 20 MG/ML
INJECTION INTRAMUSCULAR; INTRAVENOUS PRN
Status: DISCONTINUED | OUTPATIENT
Start: 2021-03-26 | End: 2021-03-26 | Stop reason: SDUPTHER

## 2021-03-26 RX ORDER — HEPARIN SODIUM 1000 [USP'U]/ML
30 INJECTION, SOLUTION INTRAVENOUS; SUBCUTANEOUS PRN
Status: DISCONTINUED | OUTPATIENT
Start: 2021-03-26 | End: 2021-03-26

## 2021-03-26 RX ORDER — FENTANYL CITRATE 50 UG/ML
100 INJECTION, SOLUTION INTRAMUSCULAR; INTRAVENOUS
Status: DISCONTINUED | OUTPATIENT
Start: 2021-03-26 | End: 2021-03-29

## 2021-03-26 RX ORDER — ACETAMINOPHEN 500 MG
1000 TABLET ORAL EVERY 8 HOURS SCHEDULED
Status: DISCONTINUED | OUTPATIENT
Start: 2021-03-26 | End: 2021-04-20

## 2021-03-26 RX ORDER — FENTANYL CITRATE 50 UG/ML
50 INJECTION, SOLUTION INTRAMUSCULAR; INTRAVENOUS EVERY 5 MIN PRN
Status: DISCONTINUED | OUTPATIENT
Start: 2021-03-26 | End: 2021-03-26

## 2021-03-26 RX ORDER — SENNA AND DOCUSATE SODIUM 50; 8.6 MG/1; MG/1
1 TABLET, FILM COATED ORAL 2 TIMES DAILY
Status: DISCONTINUED | OUTPATIENT
Start: 2021-03-26 | End: 2021-04-20

## 2021-03-26 RX ORDER — OXYCODONE HYDROCHLORIDE 5 MG/1
5 TABLET ORAL EVERY 4 HOURS PRN
Status: DISCONTINUED | OUTPATIENT
Start: 2021-03-26 | End: 2021-03-28

## 2021-03-26 RX ORDER — HEPARIN SODIUM 1000 [USP'U]/ML
60 INJECTION, SOLUTION INTRAVENOUS; SUBCUTANEOUS PRN
Status: DISCONTINUED | OUTPATIENT
Start: 2021-03-26 | End: 2021-03-26

## 2021-03-26 RX ORDER — NEOSTIGMINE METHYLSULFATE 5 MG/5 ML
SYRINGE (ML) INTRAVENOUS PRN
Status: DISCONTINUED | OUTPATIENT
Start: 2021-03-26 | End: 2021-03-26 | Stop reason: SDUPTHER

## 2021-03-26 RX ORDER — FENTANYL CITRATE 50 UG/ML
50 INJECTION, SOLUTION INTRAMUSCULAR; INTRAVENOUS
Status: DISCONTINUED | OUTPATIENT
Start: 2021-03-26 | End: 2021-03-29

## 2021-03-26 RX ORDER — SODIUM CHLORIDE 0.9 % (FLUSH) 0.9 %
10 SYRINGE (ML) INJECTION PRN
Status: DISCONTINUED | OUTPATIENT
Start: 2021-03-26 | End: 2021-04-22 | Stop reason: HOSPADM

## 2021-03-26 RX ORDER — HEPARIN SODIUM 10000 [USP'U]/100ML
5-30 INJECTION, SOLUTION INTRAVENOUS CONTINUOUS
Status: DISCONTINUED | OUTPATIENT
Start: 2021-03-26 | End: 2021-03-27

## 2021-03-26 RX ORDER — FENTANYL CITRATE 50 UG/ML
25 INJECTION, SOLUTION INTRAMUSCULAR; INTRAVENOUS EVERY 5 MIN PRN
Status: DISCONTINUED | OUTPATIENT
Start: 2021-03-26 | End: 2021-03-26

## 2021-03-26 RX ORDER — SODIUM CHLORIDE, SODIUM LACTATE, POTASSIUM CHLORIDE, CALCIUM CHLORIDE 600; 310; 30; 20 MG/100ML; MG/100ML; MG/100ML; MG/100ML
INJECTION, SOLUTION INTRAVENOUS CONTINUOUS
Status: DISCONTINUED | OUTPATIENT
Start: 2021-03-26 | End: 2021-03-28

## 2021-03-26 RX ORDER — ONDANSETRON 2 MG/ML
4 INJECTION INTRAMUSCULAR; INTRAVENOUS
Status: DISCONTINUED | OUTPATIENT
Start: 2021-03-26 | End: 2021-03-26

## 2021-03-26 RX ORDER — CEFAZOLIN SODIUM 1 G/3ML
INJECTION, POWDER, FOR SOLUTION INTRAMUSCULAR; INTRAVENOUS PRN
Status: DISCONTINUED | OUTPATIENT
Start: 2021-03-26 | End: 2021-03-26 | Stop reason: SDUPTHER

## 2021-03-26 RX ORDER — OXYCODONE HYDROCHLORIDE AND ACETAMINOPHEN 5; 325 MG/1; MG/1
1 TABLET ORAL EVERY 4 HOURS PRN
Status: DISCONTINUED | OUTPATIENT
Start: 2021-03-26 | End: 2021-03-26

## 2021-03-26 RX ORDER — 0.9 % SODIUM CHLORIDE 0.9 %
500 INTRAVENOUS SOLUTION INTRAVENOUS
Status: DISCONTINUED | OUTPATIENT
Start: 2021-03-26 | End: 2021-03-26

## 2021-03-26 RX ORDER — FENTANYL CITRATE 50 UG/ML
50 INJECTION, SOLUTION INTRAMUSCULAR; INTRAVENOUS
Status: CANCELLED | OUTPATIENT
Start: 2021-03-26

## 2021-03-26 RX ORDER — GLYCOPYRROLATE 1 MG/5 ML
SYRINGE (ML) INTRAVENOUS PRN
Status: DISCONTINUED | OUTPATIENT
Start: 2021-03-26 | End: 2021-03-26 | Stop reason: SDUPTHER

## 2021-03-26 RX ORDER — POLYETHYLENE GLYCOL 3350 17 G/17G
17 POWDER, FOR SOLUTION ORAL DAILY PRN
Status: CANCELLED | OUTPATIENT
Start: 2021-03-26

## 2021-03-26 RX ORDER — PROMETHAZINE HYDROCHLORIDE 25 MG/ML
6.25 INJECTION, SOLUTION INTRAMUSCULAR; INTRAVENOUS
Status: DISCONTINUED | OUTPATIENT
Start: 2021-03-26 | End: 2021-03-26

## 2021-03-26 RX ORDER — HEPARIN SODIUM 1000 [USP'U]/ML
40 INJECTION, SOLUTION INTRAVENOUS; SUBCUTANEOUS PRN
Status: DISCONTINUED | OUTPATIENT
Start: 2021-03-26 | End: 2021-03-27

## 2021-03-26 RX ORDER — GABAPENTIN 300 MG/1
300 CAPSULE ORAL 3 TIMES DAILY
Status: DISCONTINUED | OUTPATIENT
Start: 2021-03-26 | End: 2021-03-29

## 2021-03-26 RX ORDER — ASPIRIN 81 MG/1
81 TABLET, CHEWABLE ORAL DAILY
Status: DISCONTINUED | OUTPATIENT
Start: 2021-03-26 | End: 2021-03-26

## 2021-03-26 RX ORDER — PROPOFOL 10 MG/ML
INJECTION, EMULSION INTRAVENOUS PRN
Status: DISCONTINUED | OUTPATIENT
Start: 2021-03-26 | End: 2021-03-26 | Stop reason: SDUPTHER

## 2021-03-26 RX ORDER — DIPHENHYDRAMINE HYDROCHLORIDE 50 MG/ML
12.5 INJECTION INTRAMUSCULAR; INTRAVENOUS
Status: DISCONTINUED | OUTPATIENT
Start: 2021-03-26 | End: 2021-03-26

## 2021-03-26 RX ORDER — HYDRALAZINE HYDROCHLORIDE 20 MG/ML
5 INJECTION INTRAMUSCULAR; INTRAVENOUS EVERY 10 MIN PRN
Status: DISCONTINUED | OUTPATIENT
Start: 2021-03-26 | End: 2021-03-26

## 2021-03-26 RX ORDER — FENTANYL CITRATE 50 UG/ML
INJECTION, SOLUTION INTRAMUSCULAR; INTRAVENOUS PRN
Status: DISCONTINUED | OUTPATIENT
Start: 2021-03-26 | End: 2021-03-26 | Stop reason: SDUPTHER

## 2021-03-26 RX ORDER — METHOCARBAMOL 750 MG/1
750 TABLET, FILM COATED ORAL 4 TIMES DAILY
Status: DISCONTINUED | OUTPATIENT
Start: 2021-03-26 | End: 2021-03-29

## 2021-03-26 RX ORDER — SODIUM CHLORIDE 0.9 % (FLUSH) 0.9 %
10 SYRINGE (ML) INJECTION EVERY 12 HOURS SCHEDULED
Status: DISCONTINUED | OUTPATIENT
Start: 2021-03-26 | End: 2021-04-22 | Stop reason: HOSPADM

## 2021-03-26 RX ORDER — LIDOCAINE HYDROCHLORIDE 10 MG/ML
INJECTION, SOLUTION EPIDURAL; INFILTRATION; INTRACAUDAL; PERINEURAL PRN
Status: DISCONTINUED | OUTPATIENT
Start: 2021-03-26 | End: 2021-03-26 | Stop reason: SDUPTHER

## 2021-03-26 RX ORDER — POLYETHYLENE GLYCOL 3350 17 G/17G
17 POWDER, FOR SOLUTION ORAL DAILY
Status: DISCONTINUED | OUTPATIENT
Start: 2021-03-26 | End: 2021-04-01

## 2021-03-26 RX ORDER — ONDANSETRON 2 MG/ML
INJECTION INTRAMUSCULAR; INTRAVENOUS PRN
Status: DISCONTINUED | OUTPATIENT
Start: 2021-03-26 | End: 2021-03-26 | Stop reason: SDUPTHER

## 2021-03-26 RX ORDER — ONDANSETRON 2 MG/ML
4 INJECTION INTRAMUSCULAR; INTRAVENOUS EVERY 6 HOURS PRN
Status: DISCONTINUED | OUTPATIENT
Start: 2021-03-26 | End: 2021-04-07

## 2021-03-26 RX ADMIN — FENTANYL CITRATE 50 MCG: 50 INJECTION, SOLUTION INTRAMUSCULAR; INTRAVENOUS at 22:12

## 2021-03-26 RX ADMIN — ROCURONIUM BROMIDE 30 MG: 10 INJECTION INTRAVENOUS at 07:50

## 2021-03-26 RX ADMIN — FAMOTIDINE 20 MG: 10 INJECTION INTRAVENOUS at 13:58

## 2021-03-26 RX ADMIN — FENTANYL CITRATE 100 MCG: 50 INJECTION, SOLUTION INTRAMUSCULAR; INTRAVENOUS at 07:46

## 2021-03-26 RX ADMIN — OXYCODONE HYDROCHLORIDE 5 MG: 5 TABLET ORAL at 21:22

## 2021-03-26 RX ADMIN — LIDOCAINE HYDROCHLORIDE 5 ML: 10 INJECTION, SOLUTION EPIDURAL; INFILTRATION; INTRACAUDAL; PERINEURAL at 07:46

## 2021-03-26 RX ADMIN — SODIUM CHLORIDE, POTASSIUM CHLORIDE, SODIUM LACTATE AND CALCIUM CHLORIDE: 600; 310; 30; 20 INJECTION, SOLUTION INTRAVENOUS at 07:37

## 2021-03-26 RX ADMIN — ONDANSETRON 4 MG: 2 INJECTION INTRAMUSCULAR; INTRAVENOUS at 08:00

## 2021-03-26 RX ADMIN — CEFAZOLIN 2000 MG: 1 INJECTION, POWDER, FOR SOLUTION INTRAMUSCULAR; INTRAVENOUS at 07:48

## 2021-03-26 RX ADMIN — FENTANYL CITRATE 50 MCG: 50 INJECTION, SOLUTION INTRAMUSCULAR; INTRAVENOUS at 08:03

## 2021-03-26 RX ADMIN — FENTANYL CITRATE 25 MCG: 50 INJECTION, SOLUTION INTRAMUSCULAR; INTRAVENOUS at 11:13

## 2021-03-26 RX ADMIN — SODIUM CHLORIDE, PRESERVATIVE FREE 10 ML: 5 INJECTION INTRAVENOUS at 20:35

## 2021-03-26 RX ADMIN — FENTANYL CITRATE 50 MCG: 50 INJECTION, SOLUTION INTRAMUSCULAR; INTRAVENOUS at 11:06

## 2021-03-26 RX ADMIN — ACETAMINOPHEN 1000 MG: 500 TABLET ORAL at 21:24

## 2021-03-26 RX ADMIN — PROPOFOL 200 MG: 10 INJECTION, EMULSION INTRAVENOUS at 07:46

## 2021-03-26 RX ADMIN — FENTANYL CITRATE 50 MCG: 50 INJECTION, SOLUTION INTRAMUSCULAR; INTRAVENOUS at 16:19

## 2021-03-26 RX ADMIN — Medication 0.6 MG: at 10:36

## 2021-03-26 RX ADMIN — FENTANYL CITRATE 25 MCG: 50 INJECTION, SOLUTION INTRAMUSCULAR; INTRAVENOUS at 10:01

## 2021-03-26 RX ADMIN — SODIUM CHLORIDE, POTASSIUM CHLORIDE, SODIUM LACTATE AND CALCIUM CHLORIDE: 600; 310; 30; 20 INJECTION, SOLUTION INTRAVENOUS at 10:55

## 2021-03-26 RX ADMIN — GABAPENTIN 300 MG: 300 CAPSULE ORAL at 20:35

## 2021-03-26 RX ADMIN — GABAPENTIN 300 MG: 300 CAPSULE ORAL at 13:58

## 2021-03-26 RX ADMIN — OXYCODONE HYDROCHLORIDE 5 MG: 5 TABLET ORAL at 13:12

## 2021-03-26 RX ADMIN — FENTANYL CITRATE 50 MCG: 50 INJECTION, SOLUTION INTRAMUSCULAR; INTRAVENOUS at 11:00

## 2021-03-26 RX ADMIN — HEPARIN SODIUM 2750 UNITS: 1000 INJECTION INTRAVENOUS; SUBCUTANEOUS at 22:17

## 2021-03-26 RX ADMIN — FAMOTIDINE 20 MG: 10 INJECTION INTRAVENOUS at 20:35

## 2021-03-26 RX ADMIN — HEPARIN SODIUM AND DEXTROSE 18 UNITS/KG/HR: 10000; 5 INJECTION INTRAVENOUS at 13:20

## 2021-03-26 RX ADMIN — SODIUM CHLORIDE, POTASSIUM CHLORIDE, SODIUM LACTATE AND CALCIUM CHLORIDE: 600; 310; 30; 20 INJECTION, SOLUTION INTRAVENOUS at 13:16

## 2021-03-26 RX ADMIN — DEXAMETHASONE SODIUM PHOSPHATE 4 MG: 4 INJECTION, SOLUTION INTRA-ARTICULAR; INTRALESIONAL; INTRAMUSCULAR; INTRAVENOUS; SOFT TISSUE at 08:00

## 2021-03-26 RX ADMIN — DOCUSATE SODIUM 50MG AND SENNOSIDES 8.6MG 1 TABLET: 8.6; 5 TABLET, FILM COATED ORAL at 13:58

## 2021-03-26 RX ADMIN — Medication 3 MG: at 10:36

## 2021-03-26 RX ADMIN — FENTANYL CITRATE 25 MCG: 50 INJECTION, SOLUTION INTRAMUSCULAR; INTRAVENOUS at 10:31

## 2021-03-26 RX ADMIN — DOCUSATE SODIUM 50MG AND SENNOSIDES 8.6MG 1 TABLET: 8.6; 5 TABLET, FILM COATED ORAL at 20:35

## 2021-03-26 RX ADMIN — MAGNESIUM SULFATE 2000 MG: 2 INJECTION INTRAVENOUS at 16:37

## 2021-03-26 RX ADMIN — METHOCARBAMOL TABLETS 750 MG: 750 TABLET, COATED ORAL at 13:58

## 2021-03-26 RX ADMIN — METHOCARBAMOL TABLETS 750 MG: 750 TABLET, COATED ORAL at 16:36

## 2021-03-26 RX ADMIN — ROCURONIUM BROMIDE 20 MG: 10 INJECTION INTRAVENOUS at 08:03

## 2021-03-26 RX ADMIN — SUCCINYLCHOLINE CHLORIDE 200 MG: 20 INJECTION, SOLUTION INTRAMUSCULAR; INTRAVENOUS at 07:46

## 2021-03-26 RX ADMIN — HEPARIN SODIUM 5000 UNITS: 1000 INJECTION, SOLUTION INTRAVENOUS; SUBCUTANEOUS at 08:15

## 2021-03-26 RX ADMIN — METHOCARBAMOL TABLETS 750 MG: 750 TABLET, COATED ORAL at 20:35

## 2021-03-26 RX ADMIN — ACETAMINOPHEN 1000 MG: 500 TABLET ORAL at 13:58

## 2021-03-26 RX ADMIN — HEPARIN SODIUM 3000 UNITS: 1000 INJECTION, SOLUTION INTRAVENOUS; SUBCUTANEOUS at 08:59

## 2021-03-26 ASSESSMENT — PULMONARY FUNCTION TESTS
PIF_VALUE: 14
PIF_VALUE: 16
PIF_VALUE: 14
PIF_VALUE: 14
PIF_VALUE: 6
PIF_VALUE: 14
PIF_VALUE: 14
PIF_VALUE: 15
PIF_VALUE: 12
PIF_VALUE: 3
PIF_VALUE: 16
PIF_VALUE: 14
PIF_VALUE: 15
PIF_VALUE: 14
PIF_VALUE: 15
PIF_VALUE: 14
PIF_VALUE: 15
PIF_VALUE: 14
PIF_VALUE: 14
PIF_VALUE: 13
PIF_VALUE: 14
PIF_VALUE: 16
PIF_VALUE: 14
PIF_VALUE: 14
PIF_VALUE: 12
PIF_VALUE: 13
PIF_VALUE: 16
PIF_VALUE: 14
PIF_VALUE: 17
PIF_VALUE: 15
PIF_VALUE: 15
PIF_VALUE: 14
PIF_VALUE: 3
PIF_VALUE: 14
PIF_VALUE: 14
PIF_VALUE: 13
PIF_VALUE: 3
PIF_VALUE: 15
PIF_VALUE: 14
PIF_VALUE: 17
PIF_VALUE: 7
PIF_VALUE: 15
PIF_VALUE: 14
PIF_VALUE: 13
PIF_VALUE: 1
PIF_VALUE: 14
PIF_VALUE: 17
PIF_VALUE: 15
PIF_VALUE: 1
PIF_VALUE: 16
PIF_VALUE: 14
PIF_VALUE: 14
PIF_VALUE: 12
PIF_VALUE: 14
PIF_VALUE: 14
PIF_VALUE: 16
PIF_VALUE: 6
PIF_VALUE: 2
PIF_VALUE: 16
PIF_VALUE: 13
PIF_VALUE: 12
PIF_VALUE: 15
PIF_VALUE: 11
PIF_VALUE: 14
PIF_VALUE: 14
PIF_VALUE: 0
PIF_VALUE: 14
PIF_VALUE: 15
PIF_VALUE: 14
PIF_VALUE: 16
PIF_VALUE: 14
PIF_VALUE: 14
PIF_VALUE: 16
PIF_VALUE: 14
PIF_VALUE: 14
PIF_VALUE: 17
PIF_VALUE: 3
PIF_VALUE: 14
PIF_VALUE: 14
PIF_VALUE: 12
PIF_VALUE: 14
PIF_VALUE: 15
PIF_VALUE: 16
PIF_VALUE: 14
PIF_VALUE: 3
PIF_VALUE: 16
PIF_VALUE: 16
PIF_VALUE: 18
PIF_VALUE: 14
PIF_VALUE: 13
PIF_VALUE: 1
PIF_VALUE: 12
PIF_VALUE: 14

## 2021-03-26 ASSESSMENT — PAIN DESCRIPTION - PAIN TYPE
TYPE: SURGICAL PAIN
TYPE: SURGICAL PAIN

## 2021-03-26 ASSESSMENT — ENCOUNTER SYMPTOMS
SHORTNESS OF BREATH: 0
TROUBLE SWALLOWING: 0

## 2021-03-26 ASSESSMENT — PAIN DESCRIPTION - ORIENTATION
ORIENTATION: LEFT

## 2021-03-26 ASSESSMENT — PAIN DESCRIPTION - LOCATION
LOCATION: ARM

## 2021-03-26 ASSESSMENT — PAIN SCALES - GENERAL
PAINLEVEL_OUTOF10: 6
PAINLEVEL_OUTOF10: 5
PAINLEVEL_OUTOF10: 4
PAINLEVEL_OUTOF10: 6
PAINLEVEL_OUTOF10: 4
PAINLEVEL_OUTOF10: 7
PAINLEVEL_OUTOF10: 5
PAINLEVEL_OUTOF10: 4

## 2021-03-26 ASSESSMENT — PAIN DESCRIPTION - DESCRIPTORS: DESCRIPTORS: DISCOMFORT
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
## 2021-03-26 NOTE — PLAN OF CARE
Nutrition Problem #1: Inadequate oral intake  Intervention: Food and/or Nutrient Delivery: Continue NPO  Nutritional Goals: Start of nutrition within 24-48 hours.
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
## 2021-03-26 NOTE — PROGRESS NOTES
Speech Language Pathology  [address]  Speech Language Pathology    SPEECH/COGNITIVE ASSESSMENT    NO LOC,[facility] OR CVA/TIA - ST TO DEFER AT THIS TIME      Date: 3/26/2021  Patient Name: Kierra Vines  YOB: 1980   AGE: 47 y.o. MRN: [id number]        PT NOT SEEN FOR SPEECH OR COGNITIVE ASSESSMENT AT THIS TIME AS NO LOC, [facility] OR CVA/TIA IS DOCUMENTED. ST TO DEFER AT THIS TIME. PLEASE RE-COSULT AS NEEDED.       Lizzeth August  3/26/2021  12:56 PM
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
## 2021-03-26 NOTE — OP NOTE
to allow for a tension-free primary anastomosis which was completed with 6-0 double-armed Prolene. Doppler was used and there is noted to be good signal distal to anastomosis. The ulnar and radial arteries at the wrist were palpated and Doppler was used with no strong signal noted. Cutdown of both the ulnar and radial arteries were performed Vesseloops were used for proximal and distal control and transverse arteriotomies were made. Backbleeding was noted and Myles catheter the was placed proximally. The arteriotomies were then closed with 6-0 Prolene. The subcuticular and dermis was closed with 3-0 Vicryl and 4-0 Monocryl at the distal wrist cutdown sites and the proximal brachial artery exposure site. The laceration site was copiously irrigated and combat gauze was used to pack the wound. After some time the wound was irrigated again and hemostasis was obtained via Bovie cautery, clips, suture ligation with 3-0 Vicryl. The anastomoses were checked again and the ulnar artery anastomosis had a dopplerable signal, the radial artery anastomosis site appeared to spasm. Orthopedics was consulted prior to the operating room and presented to the OR for evaluation, decision per the orthopedics team was for take back tomorrow for repair of nerves and tendons. The left upper extremity laceration site was approximated with 2-0 nylon mattress sutures. The arm was again cleansed and dried. Adaptic was placed over the laceration site closure followed by ABDs and the forearm was wrapped in Webril and a volar forearm splint was applied. All instrument, needle, and sponge counts were correct. Patient tolerated the procedure well without any complications. Patient was extubated and transferred to PACU in stable condition.       Dr. Lourdes Steel was present for the case     Plan:  1. To TICU for [facility] neurovascular checks   2. Start ASA 81mg   3.  Ortho for take back for nerve/tendon repair      Thank you, Electronically signed by Palomo Mi DO on 3/26/2021 at 10:25 AM
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
## 2021-03-26 NOTE — PROGRESS NOTES
RAPID Covid 19 swab taken from right nare, labeled, placed in red dot bag, and handed off to second healthcare worker outside of room for transport to laboratory per hospital policy and procedure. Patient tolerated procedure well.       Swab sent to Lab via tube system
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
## 2021-03-26 NOTE — PROCEDURES
PROCEDURE NOTE - LACERATION CLOSURE    PATIENT NAME: Saqib Jimenez  MEDICAL RECORD NO. [id number]  DATE: 3/26/2021  SURGEON: Dr Nilda De Guzman / Florencia Beebe: ALLISON Chacon CNP    PREOPERATIVE DIAGNOSIS: Laceration(s) as follows:   LOCATION: R hypothenar eminence   LENGTH: 2.5 cm   LAYERED CLOSURE: No    POSTOPERATIVE DIAGNOSIS:  Same  PROCEDURE PERFORMED:  Suture closure of laceration  ANESTHESIA:  None rquired  ESTIMATED BLOOD LOSS:  Less than 5 ml. COMPLICATIONS:  None immediately appreciated. OPERATIVE NOTE PREPARED BY: Drake Cabezas MD     DISCUSSION:  Saqib Jimenez is a 17 y.o.-year-old male who was taken emergently to  OR for LUE laceration and control of arterial hemorrhage. Consent was implied as procedure was required emergently. PROCEDURE:  After LUE exploration was complete, attention was turned towards right hand laceration. Roughly 2.5 cm laceration ovr hypothenar eminence noted. Hemostatic. The wound was explored without foreign body identified,  irrigated with sterile saline and prepped with chloraprep. Two simple interrupted nylon sutures were placed. No immediate complication was evident. All sponge, instrument and needle counts were correct at the completion of the procedure.        Drake Cabezas MD  3/26/21, 11:21 AM

## 2021-03-26 NOTE — PROGRESS NOTES
Occupational Therapy Not Seen Note    DATE: 3/26/2021  Name: Selwyn Moore  : 1980  MRN: [id number]    Patient not available for Occupational Therapy due to:    Surgery/Procedure: \"Will plan to take to the OR tomorrow morning for further exploration and repair of muscle and nerve injuries. \"    Next Scheduled Treatment: 3/27/21 post op    Electronically signed by Isaac Ingram OT on 3/26/2021 at 1:13 PM
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
## 2021-03-26 NOTE — PLAN OF CARE
Problem: Pain:  Goal: Pain level will decrease  Description: Pain level will decrease  Outcome: Ongoing  Goal: Control of acute pain  Description: Control of acute pain  Outcome: Ongoing  Goal: Control of chronic pain  Description: Control of chronic pain  Outcome: Ongoing     Problem: Nutrition  Goal: Optimal nutrition therapy  3/26/2021 1747 by Wing Liliana RN  Outcome: Ongoing  3/26/2021 1609 by Zoie White RD, LD  Outcome: Ongoing  Note: Nutrition Problem #1: Inadequate oral intake  Intervention: Food and/or Nutrient Delivery: Continue NPO  Nutritional Goals: Start of nutrition within 24-48 hours.
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
## 2021-03-27 ENCOUNTER — ANESTHESIA EVENT (OUTPATIENT)
Dept: OPERATING ROOM | Age: 41
DRG: 904 | End: 2021-03-27
Payer: COMMERCIAL

## 2021-03-27 ENCOUNTER — ANESTHESIA (OUTPATIENT)
Dept: OPERATING ROOM | Age: 41
DRG: 904 | End: 2021-03-27
Payer: COMMERCIAL

## 2021-03-27 VITALS — OXYGEN SATURATION: 100 % | SYSTOLIC BLOOD PRESSURE: 129 MMHG | TEMPERATURE: 99.3 F | DIASTOLIC BLOOD PRESSURE: 76 MMHG

## 2021-03-27 LAB
ABSOLUTE EOS #: <0.03 K/UL (ref 0–0.44)
ABSOLUTE IMMATURE GRANULOCYTE: 0.05 K/UL (ref 0–0.3)
ABSOLUTE LYMPH #: 1.54 K/UL (ref 1.1–3.7)
ABSOLUTE MONO #: 1.07 K/UL (ref 0.1–1.2)
ANION GAP SERPL CALCULATED.3IONS-SCNC: 6 MMOL/L (ref 9–17)
ANION GAP SERPL CALCULATED.3IONS-SCNC: 6 MMOL/L (ref 9–17)
BASOPHILS # BLD: 0 % (ref 0–2)
BASOPHILS ABSOLUTE: 0.03 K/UL (ref 0–0.2)
BUN BLDV-MCNC: 12 MG/DL (ref 6–20)
BUN BLDV-MCNC: 7 MG/DL (ref 6–20)
BUN/CREAT BLD: ABNORMAL (ref 9–20)
BUN/CREAT BLD: ABNORMAL (ref 9–20)
CALCIUM SERPL-MCNC: 7.6 MG/DL (ref 8.6–10.4)
CALCIUM SERPL-MCNC: 7.7 MG/DL (ref 8.6–10.4)
CHLORIDE BLD-SCNC: 104 MMOL/L (ref 98–107)
CHLORIDE BLD-SCNC: 105 MMOL/L (ref 98–107)
CO2: 23 MMOL/L (ref 20–31)
CO2: 26 MMOL/L (ref 20–31)
CREAT SERPL-MCNC: 0.63 MG/DL (ref 0.7–1.2)
CREAT SERPL-MCNC: 0.69 MG/DL (ref 0.7–1.2)
DIFFERENTIAL TYPE: ABNORMAL
EKG ATRIAL RATE: 68 BPM
EKG P AXIS: 69 DEGREES
EKG P-R INTERVAL: 128 MS
EKG Q-T INTERVAL: 420 MS
EKG QRS DURATION: 108 MS
EKG QTC CALCULATION (BAZETT): 446 MS
EKG R AXIS: 61 DEGREES
EKG T AXIS: 56 DEGREES
EKG VENTRICULAR RATE: 68 BPM
EOSINOPHILS RELATIVE PERCENT: 0 % (ref 1–4)
GFR AFRICAN AMERICAN: >60 ML/MIN
GFR AFRICAN AMERICAN: >60 ML/MIN
GFR NON-AFRICAN AMERICAN: >60 ML/MIN
GFR NON-AFRICAN AMERICAN: >60 ML/MIN
GFR SERPL CREATININE-BSD FRML MDRD: ABNORMAL ML/MIN/{1.73_M2}
GLUCOSE BLD-MCNC: 112 MG/DL (ref 70–99)
GLUCOSE BLD-MCNC: 126 MG/DL (ref 70–99)
HCT VFR BLD CALC: 27.2 % (ref 40.7–50.3)
HCT VFR BLD CALC: 30.4 % (ref 40.7–50.3)
HEMOGLOBIN: 9 G/DL (ref 13–17)
HEMOGLOBIN: 9.7 G/DL (ref 13–17)
IMMATURE GRANULOCYTES: 0 %
LYMPHOCYTES # BLD: 13 % (ref 24–43)
MAGNESIUM: 2.2 MG/DL (ref 1.6–2.6)
MCH RBC QN AUTO: 32.1 PG (ref 25.2–33.5)
MCHC RBC AUTO-ENTMCNC: 31.9 G/DL (ref 28.4–34.8)
MCV RBC AUTO: 100.7 FL (ref 82.6–102.9)
MONOCYTES # BLD: 9 % (ref 3–12)
MRSA, DNA, NASAL: NORMAL
NRBC AUTOMATED: 0 PER 100 WBC
PARTIAL THROMBOPLASTIN TIME: 43.9 SEC (ref 20.5–30.5)
PARTIAL THROMBOPLASTIN TIME: 62.7 SEC (ref 20.5–30.5)
PDW BLD-RTO: 12 % (ref 11.8–14.4)
PLATELET # BLD: 216 K/UL (ref 138–453)
PLATELET ESTIMATE: ABNORMAL
PMV BLD AUTO: 9.8 FL (ref 8.1–13.5)
POTASSIUM SERPL-SCNC: 3.7 MMOL/L (ref 3.7–5.3)
POTASSIUM SERPL-SCNC: 4.4 MMOL/L (ref 3.7–5.3)
RBC # BLD: 3.02 M/UL (ref 4.21–5.77)
RBC # BLD: ABNORMAL 10*6/UL
SEG NEUTROPHILS: 77 % (ref 36–65)
SEGMENTED NEUTROPHILS ABSOLUTE COUNT: 9.01 K/UL (ref 1.5–8.1)
SODIUM BLD-SCNC: 133 MMOL/L (ref 135–144)
SODIUM BLD-SCNC: 137 MMOL/L (ref 135–144)
SPECIMEN DESCRIPTION: NORMAL
WBC # BLD: 11.7 K/UL (ref 3.5–11.3)
WBC # BLD: ABNORMAL 10*3/UL

## 2021-03-27 PROCEDURE — 7100000000 HC PACU RECOVERY - FIRST 15 MIN: Performed by: ORTHOPAEDIC SURGERY

## 2021-03-27 PROCEDURE — C9352 NEURAGEN NERVE GUIDE, PER CM: HCPCS | Performed by: ORTHOPAEDIC SURGERY

## 2021-03-27 PROCEDURE — 2580000003 HC RX 258: Performed by: STUDENT IN AN ORGANIZED HEALTH CARE EDUCATION/TRAINING PROGRAM

## 2021-03-27 PROCEDURE — 6370000000 HC RX 637 (ALT 250 FOR IP): Performed by: STUDENT IN AN ORGANIZED HEALTH CARE EDUCATION/TRAINING PROGRAM

## 2021-03-27 PROCEDURE — 2500000003 HC RX 250 WO HCPCS: Performed by: SURGERY

## 2021-03-27 PROCEDURE — 2500000003 HC RX 250 WO HCPCS: Performed by: NURSE ANESTHETIST, CERTIFIED REGISTERED

## 2021-03-27 PROCEDURE — 3E03317 INTRODUCTION OF OTHER THROMBOLYTIC INTO PERIPHERAL VEIN, PERCUTANEOUS APPROACH: ICD-10-PCS | Performed by: SURGERY

## 2021-03-27 PROCEDURE — 7100000001 HC PACU RECOVERY - ADDTL 15 MIN: Performed by: ORTHOPAEDIC SURGERY

## 2021-03-27 PROCEDURE — 6360000002 HC RX W HCPCS: Performed by: ANESTHESIOLOGY

## 2021-03-27 PROCEDURE — 06BP3ZZ EXCISION OF RIGHT SAPHENOUS VEIN, PERCUTANEOUS APPROACH: ICD-10-PCS | Performed by: SURGERY

## 2021-03-27 PROCEDURE — 3700000001 HC ADD 15 MINUTES (ANESTHESIA): Performed by: ORTHOPAEDIC SURGERY

## 2021-03-27 PROCEDURE — 3600000014 HC SURGERY LEVEL 4 ADDTL 15MIN: Performed by: ORTHOPAEDIC SURGERY

## 2021-03-27 PROCEDURE — 6370000000 HC RX 637 (ALT 250 FOR IP): Performed by: SURGERY

## 2021-03-27 PROCEDURE — 85025 COMPLETE CBC W/AUTO DIFF WBC: CPT

## 2021-03-27 PROCEDURE — 03CA0ZZ EXTIRPATION OF MATTER FROM LEFT ULNAR ARTERY, OPEN APPROACH: ICD-10-PCS | Performed by: SURGERY

## 2021-03-27 PROCEDURE — 6360000002 HC RX W HCPCS: Performed by: STUDENT IN AN ORGANIZED HEALTH CARE EDUCATION/TRAINING PROGRAM

## 2021-03-27 PROCEDURE — 36415 COLL VENOUS BLD VENIPUNCTURE: CPT

## 2021-03-27 PROCEDURE — 2709999900 HC NON-CHARGEABLE SUPPLY: Performed by: ORTHOPAEDIC SURGERY

## 2021-03-27 PROCEDURE — 6360000002 HC RX W HCPCS: Performed by: SURGERY

## 2021-03-27 PROCEDURE — 85018 HEMOGLOBIN: CPT

## 2021-03-27 PROCEDURE — 85730 THROMBOPLASTIN TIME PARTIAL: CPT

## 2021-03-27 PROCEDURE — 3700000000 HC ANESTHESIA ATTENDED CARE: Performed by: ORTHOPAEDIC SURGERY

## 2021-03-27 PROCEDURE — 2580000003 HC RX 258: Performed by: NURSE ANESTHETIST, CERTIFIED REGISTERED

## 2021-03-27 PROCEDURE — 03CC0ZZ EXTIRPATION OF MATTER FROM LEFT RADIAL ARTERY, OPEN APPROACH: ICD-10-PCS | Performed by: SURGERY

## 2021-03-27 PROCEDURE — 87086 URINE CULTURE/COLONY COUNT: CPT

## 2021-03-27 PROCEDURE — 90471 IMMUNIZATION ADMIN: CPT | Performed by: STUDENT IN AN ORGANIZED HEALTH CARE EDUCATION/TRAINING PROGRAM

## 2021-03-27 PROCEDURE — 90715 TDAP VACCINE 7 YRS/> IM: CPT | Performed by: STUDENT IN AN ORGANIZED HEALTH CARE EDUCATION/TRAINING PROGRAM

## 2021-03-27 PROCEDURE — 3600000004 HC SURGERY LEVEL 4 BASE: Performed by: ORTHOPAEDIC SURGERY

## 2021-03-27 PROCEDURE — 85014 HEMATOCRIT: CPT

## 2021-03-27 PROCEDURE — 6360000002 HC RX W HCPCS: Performed by: NURSE ANESTHETIST, CERTIFIED REGISTERED

## 2021-03-27 PROCEDURE — 2500000003 HC RX 250 WO HCPCS: Performed by: STUDENT IN AN ORGANIZED HEALTH CARE EDUCATION/TRAINING PROGRAM

## 2021-03-27 PROCEDURE — 80048 BASIC METABOLIC PNL TOTAL CA: CPT

## 2021-03-27 PROCEDURE — 2000000000 HC ICU R&B

## 2021-03-27 PROCEDURE — 2580000003 HC RX 258: Performed by: SURGERY

## 2021-03-27 PROCEDURE — 83735 ASSAY OF MAGNESIUM: CPT

## 2021-03-27 PROCEDURE — 2580000003 HC RX 258: Performed by: ORTHOPAEDIC SURGERY

## 2021-03-27 PROCEDURE — 93010 ELECTROCARDIOGRAM REPORT: CPT | Performed by: INTERNAL MEDICINE

## 2021-03-27 PROCEDURE — 0318094 BYPASS LEFT BRACHIAL ARTERY TO LEFT LOWER ARM ARTERY WITH AUTOLOGOUS VENOUS TISSUE, OPEN APPROACH: ICD-10-PCS | Performed by: SURGERY

## 2021-03-27 DEVICE — NEURAGEN® NERVE GUIDE 4MM (ID) X 3CM (LENGTH)
Type: IMPLANTABLE DEVICE | Site: ARM | Status: FUNCTIONAL
Brand: NEURAGEN®

## 2021-03-27 DEVICE — NEURAGEN® NERVE GUIDE 3MM (ID) X 3CM (LENGTH)
Type: IMPLANTABLE DEVICE | Site: ARM | Status: FUNCTIONAL
Brand: NEURAGEN®

## 2021-03-27 RX ORDER — PROPOFOL 10 MG/ML
INJECTION, EMULSION INTRAVENOUS PRN
Status: DISCONTINUED | OUTPATIENT
Start: 2021-03-27 | End: 2021-03-27 | Stop reason: SDUPTHER

## 2021-03-27 RX ORDER — HEPARIN SODIUM 1000 [USP'U]/ML
INJECTION, SOLUTION INTRAVENOUS; SUBCUTANEOUS PRN
Status: DISCONTINUED | OUTPATIENT
Start: 2021-03-27 | End: 2021-03-27 | Stop reason: SDUPTHER

## 2021-03-27 RX ORDER — MORPHINE SULFATE 2 MG/ML
2 INJECTION, SOLUTION INTRAMUSCULAR; INTRAVENOUS EVERY 5 MIN PRN
Status: DISCONTINUED | OUTPATIENT
Start: 2021-03-27 | End: 2021-03-27 | Stop reason: HOSPADM

## 2021-03-27 RX ORDER — MIDAZOLAM HYDROCHLORIDE 1 MG/ML
INJECTION INTRAMUSCULAR; INTRAVENOUS PRN
Status: DISCONTINUED | OUTPATIENT
Start: 2021-03-27 | End: 2021-03-27 | Stop reason: SDUPTHER

## 2021-03-27 RX ORDER — DIPHENHYDRAMINE HYDROCHLORIDE 50 MG/ML
12.5 INJECTION INTRAMUSCULAR; INTRAVENOUS
Status: DISCONTINUED | OUTPATIENT
Start: 2021-03-27 | End: 2021-03-27 | Stop reason: HOSPADM

## 2021-03-27 RX ORDER — MAGNESIUM SULFATE IN WATER 40 MG/ML
2000 INJECTION, SOLUTION INTRAVENOUS ONCE
Status: COMPLETED | OUTPATIENT
Start: 2021-03-27 | End: 2021-03-27

## 2021-03-27 RX ORDER — SUCCINYLCHOLINE/SOD CL,ISO/PF 100 MG/5ML
SYRINGE (ML) INTRAVENOUS PRN
Status: DISCONTINUED | OUTPATIENT
Start: 2021-03-27 | End: 2021-03-27 | Stop reason: SDUPTHER

## 2021-03-27 RX ORDER — EPHEDRINE SULFATE/0.9% NACL/PF 50 MG/5 ML
SYRINGE (ML) INTRAVENOUS PRN
Status: DISCONTINUED | OUTPATIENT
Start: 2021-03-27 | End: 2021-03-27 | Stop reason: SDUPTHER

## 2021-03-27 RX ORDER — HEPARIN SODIUM 1000 [USP'U]/ML
2000 INJECTION, SOLUTION INTRAVENOUS; SUBCUTANEOUS PRN
Status: DISCONTINUED | OUTPATIENT
Start: 2021-03-27 | End: 2021-03-31

## 2021-03-27 RX ORDER — HEPARIN SODIUM 10000 [USP'U]/100ML
5-30 INJECTION, SOLUTION INTRAVENOUS CONTINUOUS
Status: DISCONTINUED | OUTPATIENT
Start: 2021-03-27 | End: 2021-03-31

## 2021-03-27 RX ORDER — CEFAZOLIN SODIUM 1 G/3ML
INJECTION, POWDER, FOR SOLUTION INTRAMUSCULAR; INTRAVENOUS PRN
Status: DISCONTINUED | OUTPATIENT
Start: 2021-03-27 | End: 2021-03-27 | Stop reason: SDUPTHER

## 2021-03-27 RX ORDER — FENTANYL CITRATE 50 UG/ML
INJECTION, SOLUTION INTRAMUSCULAR; INTRAVENOUS PRN
Status: DISCONTINUED | OUTPATIENT
Start: 2021-03-27 | End: 2021-03-27 | Stop reason: SDUPTHER

## 2021-03-27 RX ORDER — ONDANSETRON 2 MG/ML
4 INJECTION INTRAMUSCULAR; INTRAVENOUS
Status: DISCONTINUED | OUTPATIENT
Start: 2021-03-27 | End: 2021-03-27 | Stop reason: HOSPADM

## 2021-03-27 RX ORDER — POTASSIUM CHLORIDE 20 MEQ/1
40 TABLET, EXTENDED RELEASE ORAL 2 TIMES DAILY
Status: DISCONTINUED | OUTPATIENT
Start: 2021-03-27 | End: 2021-03-27

## 2021-03-27 RX ORDER — ONDANSETRON 2 MG/ML
INJECTION INTRAMUSCULAR; INTRAVENOUS PRN
Status: DISCONTINUED | OUTPATIENT
Start: 2021-03-27 | End: 2021-03-27 | Stop reason: SDUPTHER

## 2021-03-27 RX ORDER — GLYCOPYRROLATE 1 MG/5 ML
SYRINGE (ML) INTRAVENOUS PRN
Status: DISCONTINUED | OUTPATIENT
Start: 2021-03-27 | End: 2021-03-27 | Stop reason: SDUPTHER

## 2021-03-27 RX ORDER — LIDOCAINE HYDROCHLORIDE 10 MG/ML
INJECTION, SOLUTION EPIDURAL; INFILTRATION; INTRACAUDAL; PERINEURAL PRN
Status: DISCONTINUED | OUTPATIENT
Start: 2021-03-27 | End: 2021-03-27 | Stop reason: SDUPTHER

## 2021-03-27 RX ORDER — NEOSTIGMINE METHYLSULFATE 5 MG/5 ML
SYRINGE (ML) INTRAVENOUS PRN
Status: DISCONTINUED | OUTPATIENT
Start: 2021-03-27 | End: 2021-03-27 | Stop reason: SDUPTHER

## 2021-03-27 RX ORDER — ROCURONIUM BROMIDE 10 MG/ML
INJECTION, SOLUTION INTRAVENOUS PRN
Status: DISCONTINUED | OUTPATIENT
Start: 2021-03-27 | End: 2021-03-27 | Stop reason: SDUPTHER

## 2021-03-27 RX ORDER — OXYCODONE HYDROCHLORIDE AND ACETAMINOPHEN 5; 325 MG/1; MG/1
2 TABLET ORAL PRN
Status: DISCONTINUED | OUTPATIENT
Start: 2021-03-27 | End: 2021-03-27 | Stop reason: HOSPADM

## 2021-03-27 RX ORDER — LABETALOL HYDROCHLORIDE 5 MG/ML
5 INJECTION, SOLUTION INTRAVENOUS EVERY 10 MIN PRN
Status: DISCONTINUED | OUTPATIENT
Start: 2021-03-27 | End: 2021-03-27 | Stop reason: HOSPADM

## 2021-03-27 RX ORDER — DEXAMETHASONE SODIUM PHOSPHATE 4 MG/ML
INJECTION, SOLUTION INTRA-ARTICULAR; INTRALESIONAL; INTRAMUSCULAR; INTRAVENOUS; SOFT TISSUE PRN
Status: DISCONTINUED | OUTPATIENT
Start: 2021-03-27 | End: 2021-03-27 | Stop reason: SDUPTHER

## 2021-03-27 RX ORDER — MAGNESIUM HYDROXIDE 1200 MG/15ML
LIQUID ORAL CONTINUOUS PRN
Status: COMPLETED | OUTPATIENT
Start: 2021-03-27 | End: 2021-03-27

## 2021-03-27 RX ORDER — OXYCODONE HYDROCHLORIDE AND ACETAMINOPHEN 5; 325 MG/1; MG/1
1 TABLET ORAL PRN
Status: DISCONTINUED | OUTPATIENT
Start: 2021-03-27 | End: 2021-03-27 | Stop reason: HOSPADM

## 2021-03-27 RX ORDER — HEPARIN SODIUM 1000 [USP'U]/ML
4000 INJECTION, SOLUTION INTRAVENOUS; SUBCUTANEOUS PRN
Status: DISCONTINUED | OUTPATIENT
Start: 2021-03-27 | End: 2021-03-31

## 2021-03-27 RX ORDER — FENTANYL CITRATE 50 UG/ML
25 INJECTION, SOLUTION INTRAMUSCULAR; INTRAVENOUS EVERY 5 MIN PRN
Status: DISCONTINUED | OUTPATIENT
Start: 2021-03-27 | End: 2021-03-27 | Stop reason: HOSPADM

## 2021-03-27 RX ADMIN — Medication 100 MG: at 13:04

## 2021-03-27 RX ADMIN — ROCURONIUM BROMIDE 40 MG: 10 INJECTION INTRAVENOUS at 13:07

## 2021-03-27 RX ADMIN — PHENYLEPHRINE HYDROCHLORIDE 100 MCG: 10 INJECTION INTRAVENOUS at 14:42

## 2021-03-27 RX ADMIN — Medication 3 MG: at 19:38

## 2021-03-27 RX ADMIN — SODIUM CHLORIDE, PRESERVATIVE FREE 10 ML: 5 INJECTION INTRAVENOUS at 08:55

## 2021-03-27 RX ADMIN — PHENYLEPHRINE HYDROCHLORIDE 25 MCG/MIN: 10 INJECTION INTRAVENOUS at 15:36

## 2021-03-27 RX ADMIN — ROCURONIUM BROMIDE 30 MG: 10 INJECTION INTRAVENOUS at 16:09

## 2021-03-27 RX ADMIN — SODIUM CHLORIDE, POTASSIUM CHLORIDE, SODIUM LACTATE AND CALCIUM CHLORIDE 100 ML/HR: 600; 310; 30; 20 INJECTION, SOLUTION INTRAVENOUS at 02:12

## 2021-03-27 RX ADMIN — CEFAZOLIN SODIUM 2 G: 10 INJECTION, POWDER, FOR SOLUTION INTRAVENOUS at 17:16

## 2021-03-27 RX ADMIN — FENTANYL CITRATE 100 MCG: 50 INJECTION, SOLUTION INTRAMUSCULAR; INTRAVENOUS at 10:36

## 2021-03-27 RX ADMIN — FENTANYL CITRATE 100 MCG: 50 INJECTION, SOLUTION INTRAMUSCULAR; INTRAVENOUS at 04:31

## 2021-03-27 RX ADMIN — POTASSIUM CHLORIDE 40 MEQ: 1500 TABLET, EXTENDED RELEASE ORAL at 08:53

## 2021-03-27 RX ADMIN — PHENYLEPHRINE HYDROCHLORIDE 100 MCG: 10 INJECTION INTRAVENOUS at 15:27

## 2021-03-27 RX ADMIN — ACETAMINOPHEN 1000 MG: 500 TABLET ORAL at 06:47

## 2021-03-27 RX ADMIN — PROPOFOL 200 MG: 10 INJECTION, EMULSION INTRAVENOUS at 13:04

## 2021-03-27 RX ADMIN — SODIUM CHLORIDE, POTASSIUM CHLORIDE, SODIUM LACTATE AND CALCIUM CHLORIDE: 600; 310; 30; 20 INJECTION, SOLUTION INTRAVENOUS at 18:31

## 2021-03-27 RX ADMIN — HYDROMORPHONE HYDROCHLORIDE 0.5 MG: 1 INJECTION, SOLUTION INTRAMUSCULAR; INTRAVENOUS; SUBCUTANEOUS at 19:33

## 2021-03-27 RX ADMIN — MIDAZOLAM HYDROCHLORIDE 2 MG: 1 INJECTION, SOLUTION INTRAMUSCULAR; INTRAVENOUS at 13:04

## 2021-03-27 RX ADMIN — LIDOCAINE HYDROCHLORIDE 50 MG: 10 INJECTION, SOLUTION EPIDURAL; INFILTRATION; INTRACAUDAL; PERINEURAL at 13:04

## 2021-03-27 RX ADMIN — METHOCARBAMOL TABLETS 750 MG: 750 TABLET, COATED ORAL at 09:42

## 2021-03-27 RX ADMIN — ONDANSETRON 4 MG: 2 INJECTION INTRAMUSCULAR; INTRAVENOUS at 19:12

## 2021-03-27 RX ADMIN — OXYCODONE HYDROCHLORIDE 5 MG: 5 TABLET ORAL at 06:47

## 2021-03-27 RX ADMIN — OXYCODONE HYDROCHLORIDE 5 MG: 5 TABLET ORAL at 11:33

## 2021-03-27 RX ADMIN — PHENYLEPHRINE HYDROCHLORIDE 200 MCG: 10 INJECTION INTRAVENOUS at 14:51

## 2021-03-27 RX ADMIN — PHENYLEPHRINE HYDROCHLORIDE 100 MCG: 10 INJECTION INTRAVENOUS at 15:08

## 2021-03-27 RX ADMIN — PHENYLEPHRINE HYDROCHLORIDE 100 MCG: 10 INJECTION INTRAVENOUS at 14:33

## 2021-03-27 RX ADMIN — FAMOTIDINE 20 MG: 10 INJECTION INTRAVENOUS at 22:36

## 2021-03-27 RX ADMIN — ROCURONIUM BROMIDE 30 MG: 10 INJECTION INTRAVENOUS at 17:45

## 2021-03-27 RX ADMIN — MAGNESIUM SULFATE 2000 MG: 2 INJECTION INTRAVENOUS at 08:36

## 2021-03-27 RX ADMIN — HEPARIN SODIUM 2000 UNITS: 1000 INJECTION, SOLUTION INTRAVENOUS; SUBCUTANEOUS at 17:42

## 2021-03-27 RX ADMIN — SODIUM CHLORIDE, POTASSIUM CHLORIDE, SODIUM LACTATE AND CALCIUM CHLORIDE: 600; 310; 30; 20 INJECTION, SOLUTION INTRAVENOUS at 14:57

## 2021-03-27 RX ADMIN — FENTANYL CITRATE 50 MCG: 50 INJECTION, SOLUTION INTRAMUSCULAR; INTRAVENOUS at 17:03

## 2021-03-27 RX ADMIN — METHOCARBAMOL TABLETS 750 MG: 750 TABLET, COATED ORAL at 22:36

## 2021-03-27 RX ADMIN — PHENYLEPHRINE HYDROCHLORIDE 100 MCG: 10 INJECTION INTRAVENOUS at 14:46

## 2021-03-27 RX ADMIN — SODIUM CHLORIDE, POTASSIUM CHLORIDE, SODIUM LACTATE AND CALCIUM CHLORIDE: 600; 310; 30; 20 INJECTION, SOLUTION INTRAVENOUS at 13:17

## 2021-03-27 RX ADMIN — Medication 5 MG: at 14:54

## 2021-03-27 RX ADMIN — Medication 5 MG: at 15:08

## 2021-03-27 RX ADMIN — FENTANYL CITRATE 50 MCG: 50 INJECTION, SOLUTION INTRAMUSCULAR; INTRAVENOUS at 16:09

## 2021-03-27 RX ADMIN — Medication 5 MG: at 15:00

## 2021-03-27 RX ADMIN — DEXAMETHASONE SODIUM PHOSPHATE 4 MG: 4 INJECTION, SOLUTION INTRA-ARTICULAR; INTRALESIONAL; INTRAMUSCULAR; INTRAVENOUS; SOFT TISSUE at 13:17

## 2021-03-27 RX ADMIN — PHENYLEPHRINE HYDROCHLORIDE 100 MCG: 10 INJECTION INTRAVENOUS at 15:00

## 2021-03-27 RX ADMIN — FENTANYL CITRATE 100 MCG: 50 INJECTION, SOLUTION INTRAMUSCULAR; INTRAVENOUS at 00:01

## 2021-03-27 RX ADMIN — TETANUS TOXOID, REDUCED DIPHTHERIA TOXOID AND ACELLULAR PERTUSSIS VACCINE, ADSORBED 0.5 ML: 5; 2.5; 8; 8; 2.5 SUSPENSION INTRAMUSCULAR at 08:46

## 2021-03-27 RX ADMIN — PHENYLEPHRINE HYDROCHLORIDE 100 MCG: 10 INJECTION INTRAVENOUS at 14:19

## 2021-03-27 RX ADMIN — FENTANYL CITRATE 50 MCG: 50 INJECTION, SOLUTION INTRAMUSCULAR; INTRAVENOUS at 14:28

## 2021-03-27 RX ADMIN — MORPHINE SULFATE 2 MG: 2 INJECTION, SOLUTION INTRAMUSCULAR; INTRAVENOUS at 20:13

## 2021-03-27 RX ADMIN — FENTANYL CITRATE 100 MCG: 50 INJECTION, SOLUTION INTRAMUSCULAR; INTRAVENOUS at 13:04

## 2021-03-27 RX ADMIN — PHENYLEPHRINE HYDROCHLORIDE 100 MCG: 10 INJECTION INTRAVENOUS at 14:00

## 2021-03-27 RX ADMIN — ROCURONIUM BROMIDE 10 MG: 10 INJECTION INTRAVENOUS at 13:04

## 2021-03-27 RX ADMIN — PHENYLEPHRINE HYDROCHLORIDE 200 MCG: 10 INJECTION INTRAVENOUS at 13:38

## 2021-03-27 RX ADMIN — FENTANYL CITRATE 50 MCG: 50 INJECTION, SOLUTION INTRAMUSCULAR; INTRAVENOUS at 17:48

## 2021-03-27 RX ADMIN — CEFAZOLIN 2000 MG: 1 INJECTION, POWDER, FOR SOLUTION INTRAMUSCULAR; INTRAVENOUS at 13:17

## 2021-03-27 RX ADMIN — HEPARIN SODIUM AND DEXTROSE 20 UNITS/KG/HR: 10000; 5 INJECTION INTRAVENOUS at 21:15

## 2021-03-27 RX ADMIN — METHOCARBAMOL TABLETS 750 MG: 750 TABLET, COATED ORAL at 12:18

## 2021-03-27 RX ADMIN — ROCURONIUM BROMIDE 20 MG: 10 INJECTION INTRAVENOUS at 15:09

## 2021-03-27 RX ADMIN — ROCURONIUM BROMIDE 20 MG: 10 INJECTION INTRAVENOUS at 13:58

## 2021-03-27 RX ADMIN — GABAPENTIN 300 MG: 300 CAPSULE ORAL at 09:42

## 2021-03-27 RX ADMIN — ROCURONIUM BROMIDE 20 MG: 10 INJECTION INTRAVENOUS at 17:00

## 2021-03-27 RX ADMIN — Medication 5 MG: at 14:03

## 2021-03-27 RX ADMIN — PHENYLEPHRINE HYDROCHLORIDE 100 MCG: 10 INJECTION INTRAVENOUS at 15:16

## 2021-03-27 RX ADMIN — GABAPENTIN 300 MG: 300 CAPSULE ORAL at 22:36

## 2021-03-27 RX ADMIN — FENTANYL CITRATE 100 MCG: 50 INJECTION, SOLUTION INTRAMUSCULAR; INTRAVENOUS at 22:32

## 2021-03-27 RX ADMIN — PHENYLEPHRINE HYDROCHLORIDE 100 MCG: 10 INJECTION INTRAVENOUS at 13:40

## 2021-03-27 RX ADMIN — HEPARIN SODIUM 2000 UNITS: 1000 INJECTION, SOLUTION INTRAVENOUS; SUBCUTANEOUS at 18:23

## 2021-03-27 RX ADMIN — Medication 0.4 MG: at 19:38

## 2021-03-27 RX ADMIN — ROCURONIUM BROMIDE 30 MG: 10 INJECTION INTRAVENOUS at 14:30

## 2021-03-27 RX ADMIN — FENTANYL CITRATE 100 MCG: 50 INJECTION, SOLUTION INTRAMUSCULAR; INTRAVENOUS at 03:11

## 2021-03-27 RX ADMIN — FAMOTIDINE 20 MG: 10 INJECTION INTRAVENOUS at 09:42

## 2021-03-27 RX ADMIN — OXYCODONE HYDROCHLORIDE 5 MG: 5 TABLET ORAL at 02:31

## 2021-03-27 RX ADMIN — HYDROMORPHONE HYDROCHLORIDE 0.5 MG: 1 INJECTION, SOLUTION INTRAMUSCULAR; INTRAVENOUS; SUBCUTANEOUS at 19:35

## 2021-03-27 RX ADMIN — PHENYLEPHRINE HYDROCHLORIDE 100 MCG: 10 INJECTION INTRAVENOUS at 13:51

## 2021-03-27 RX ADMIN — HEPARIN SODIUM 7000 UNITS: 1000 INJECTION, SOLUTION INTRAVENOUS; SUBCUTANEOUS at 16:42

## 2021-03-27 RX ADMIN — FENTANYL CITRATE 50 MCG: 50 INJECTION, SOLUTION INTRAMUSCULAR; INTRAVENOUS at 18:22

## 2021-03-27 RX ADMIN — HEPARIN SODIUM AND DEXTROSE 20 UNITS/KG/HR: 10000; 5 INJECTION INTRAVENOUS at 08:21

## 2021-03-27 ASSESSMENT — PAIN DESCRIPTION - PROGRESSION
CLINICAL_PROGRESSION: GRADUALLY WORSENING

## 2021-03-27 ASSESSMENT — PULMONARY FUNCTION TESTS
PIF_VALUE: 16
PIF_VALUE: 15
PIF_VALUE: 15
PIF_VALUE: 13
PIF_VALUE: 15
PIF_VALUE: 16
PIF_VALUE: 15
PIF_VALUE: 14
PIF_VALUE: 16
PIF_VALUE: 15
PIF_VALUE: 16
PIF_VALUE: 15
PIF_VALUE: 1
PIF_VALUE: 15
PIF_VALUE: 16
PIF_VALUE: 16
PIF_VALUE: 15
PIF_VALUE: 15
PIF_VALUE: 16
PIF_VALUE: 1
PIF_VALUE: 14
PIF_VALUE: 16
PIF_VALUE: 15
PIF_VALUE: 14
PIF_VALUE: 15
PIF_VALUE: 16
PIF_VALUE: 15
PIF_VALUE: 16
PIF_VALUE: 15
PIF_VALUE: 16
PIF_VALUE: 14
PIF_VALUE: 15
PIF_VALUE: 16
PIF_VALUE: 16
PIF_VALUE: 14
PIF_VALUE: 15
PIF_VALUE: 16
PIF_VALUE: 16
PIF_VALUE: 15
PIF_VALUE: 16
PIF_VALUE: 15
PIF_VALUE: 15
PIF_VALUE: 16
PIF_VALUE: 13
PIF_VALUE: 16
PIF_VALUE: 16
PIF_VALUE: 15
PIF_VALUE: 16
PIF_VALUE: 17
PIF_VALUE: 16
PIF_VALUE: 15
PIF_VALUE: 14
PIF_VALUE: 16
PIF_VALUE: 17
PIF_VALUE: 16
PIF_VALUE: 14
PIF_VALUE: 16
PIF_VALUE: 15
PIF_VALUE: 15
PIF_VALUE: 16
PIF_VALUE: 13
PIF_VALUE: 16
PIF_VALUE: 14
PIF_VALUE: 15
PIF_VALUE: 11
PIF_VALUE: 15
PIF_VALUE: 17
PIF_VALUE: 16
PIF_VALUE: 3
PIF_VALUE: 16
PIF_VALUE: 14
PIF_VALUE: 15
PIF_VALUE: 4
PIF_VALUE: 16
PIF_VALUE: 15
PIF_VALUE: 16
PIF_VALUE: 16
PIF_VALUE: 15
PIF_VALUE: 16
PIF_VALUE: 16
PIF_VALUE: 14
PIF_VALUE: 16
PIF_VALUE: 16
PIF_VALUE: 15
PIF_VALUE: 16
PIF_VALUE: 15
PIF_VALUE: 14
PIF_VALUE: 15
PIF_VALUE: 17
PIF_VALUE: 16
PIF_VALUE: 17
PIF_VALUE: 16
PIF_VALUE: 16
PIF_VALUE: 15
PIF_VALUE: 16
PIF_VALUE: 14
PIF_VALUE: 1
PIF_VALUE: 14
PIF_VALUE: 16
PIF_VALUE: 15
PIF_VALUE: 16
PIF_VALUE: 16
PIF_VALUE: 14
PIF_VALUE: 15
PIF_VALUE: 16
PIF_VALUE: 11
PIF_VALUE: 15
PIF_VALUE: 15
PIF_VALUE: 16
PIF_VALUE: 1
PIF_VALUE: 15
PIF_VALUE: 16
PIF_VALUE: 16
PIF_VALUE: 14
PIF_VALUE: 15
PIF_VALUE: 5
PIF_VALUE: 16
PIF_VALUE: 15
PIF_VALUE: 15
PIF_VALUE: 16
PIF_VALUE: 15
PIF_VALUE: 16
PIF_VALUE: 1
PIF_VALUE: 8
PIF_VALUE: 16
PIF_VALUE: 15
PIF_VALUE: 16
PIF_VALUE: 16
PIF_VALUE: 15
PIF_VALUE: 15
PIF_VALUE: 16
PIF_VALUE: 14
PIF_VALUE: 15
PIF_VALUE: 16
PIF_VALUE: 3
PIF_VALUE: 16
PIF_VALUE: 16
PIF_VALUE: 15
PIF_VALUE: 16
PIF_VALUE: 1
PIF_VALUE: 15
PIF_VALUE: 16
PIF_VALUE: 15
PIF_VALUE: 1
PIF_VALUE: 16
PIF_VALUE: 14
PIF_VALUE: 15
PIF_VALUE: 16
PIF_VALUE: 14
PIF_VALUE: 16
PIF_VALUE: 16
PIF_VALUE: 15
PIF_VALUE: 16
PIF_VALUE: 15
PIF_VALUE: 16
PIF_VALUE: 15
PIF_VALUE: 15
PIF_VALUE: 16
PIF_VALUE: 15
PIF_VALUE: 16
PIF_VALUE: 17
PIF_VALUE: 15
PIF_VALUE: 13
PIF_VALUE: 11
PIF_VALUE: 15

## 2021-03-27 ASSESSMENT — PAIN DESCRIPTION - FREQUENCY
FREQUENCY: CONTINUOUS

## 2021-03-27 ASSESSMENT — PAIN SCALES - GENERAL
PAINLEVEL_OUTOF10: 9
PAINLEVEL_OUTOF10: 4
PAINLEVEL_OUTOF10: 8
PAINLEVEL_OUTOF10: 8
PAINLEVEL_OUTOF10: 7
PAINLEVEL_OUTOF10: 8
PAINLEVEL_OUTOF10: 7
PAINLEVEL_OUTOF10: 5
PAINLEVEL_OUTOF10: 9
PAINLEVEL_OUTOF10: 9
PAINLEVEL_OUTOF10: 7

## 2021-03-27 ASSESSMENT — PAIN DESCRIPTION - LOCATION
LOCATION: ARM
LOCATION: ARM

## 2021-03-27 ASSESSMENT — PAIN DESCRIPTION - ORIENTATION
ORIENTATION: LEFT
ORIENTATION: LEFT;MID;LOWER
ORIENTATION: LEFT;MID;LOWER
ORIENTATION: LEFT;LOWER;MID
ORIENTATION: LEFT;LOWER;MID

## 2021-03-27 ASSESSMENT — PAIN DESCRIPTION - ONSET
ONSET: GRADUAL
ONSET: ON-GOING
ONSET: ON-GOING

## 2021-03-27 ASSESSMENT — PAIN DESCRIPTION - DESCRIPTORS
DESCRIPTORS: STABBING
DESCRIPTORS: DISCOMFORT;CONSTANT
DESCRIPTORS: CONSTANT;DISCOMFORT
DESCRIPTORS: DISCOMFORT
DESCRIPTORS: CONSTANT;DISCOMFORT;PRESSURE

## 2021-03-27 ASSESSMENT — PAIN DESCRIPTION - PAIN TYPE
TYPE: ACUTE PAIN;SURGICAL PAIN
TYPE: SURGICAL PAIN
TYPE: SURGICAL PAIN
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
## 2021-03-27 NOTE — PROGRESS NOTES
Physical Therapy    DATE: 3/27/2021    NAME: Mishel Méndez  MRN: [id number]   : 1980      Patient not seen this date for Physical Therapy due to:    Surgery/Procedure: OR this morning with ortho for exploration and nerve repair of the left arm      Electronically signed by Dagoberto Borja PT on 1986 at 7:28 AM
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
## 2021-03-27 NOTE — PROGRESS NOTES
OR this morning with ortho for exploration and nerve repair of the left arm. Vascular to see intra-operatively  -NWB to JULIA. Maintain splint if possible  -Pain control per primary team discretion   -Constant ice (ok to alternate ice on/off every 20 min) and elevation to improve swelling  -NPO since midnight  -Ancef on call to OR  -Consent in chart, extremity marked  -Please page Ortho with any questions    Maite Richardson DO  PGY-1 Orthopedic Surgery  2:13 AM 3/27/2021    PGY-2 Addendum    Patient seen and examined personally, and I agree with subjective portion as stated above by Dr. Mariel Eisenmenger. All disagreements have been changed in the above note. Called overnight for compartment check/rule out compartment syndrome. Patient swollen to the left forearm but compartments within the forearm and hand are soft and compressible. Minimal pain with deep palpation and passive extension/flexion of the digits and wrist. No overt signs of infection. Non palpable radial and ulnar pulses, nursing unable to consistently doppler pulses since surgery. Hand and digits slightly cool but appear to be perfused. Sluggish cap refill. Plan for OR today for left forearm exploration and median nerve repair. NPO since midnight. Ancef on call to the OR. Consent in chart. Extremity marked. Vascular to see intra-op. Not in acute compartment syndrome overnight.     Ajith Herndon DO  PGY-2 Orthopedic Surgery  5:44 AM 3/27/2021

## 2021-03-27 NOTE — FLOWSHEET NOTE
was walking by the waiting room for surgery and stopped to speak with the family. They were anxious about the surgery.  offered a prayer for a successful surgery. 03/27/21 1450   Encounter Summary   Services provided to: Family   Referral/Consult From: [facility] Family members   Continue Visiting   (3/27/2021)   Complexity of Encounter Moderate   Length of Encounter 30 minutes   Spiritual Assessment Completed Yes   Routine   Type Initial   Assessment Calm; Approachable   Intervention Prayer;Evans   Outcome   (Connection to the holy)

## 2021-03-27 NOTE — BRIEF OP NOTE
Brief Postoperative Note      Patient: Stephanie Ta  YOB: 1980  MRN: [id number]    Date of Procedure: 3/27/2021    Pre-Op Diagnosis: LEFT FOREARM LACERATION    Post-Op Diagnosis: Same       Procedure(s):  Left FOREARM EXPLORATION BRACHIAL ARTERY BYPASS WITH SVG, Ulnar/radial artery thrombectomy, TPA infusion     Surgeon(s):  Kane Thakkar MD    Assistant:  Resident:  Mayte Viveros DO    Anesthesia: General    Estimated Blood Loss (mL): 515LF    Complications: excessive muscle swelling and poor/no outflow     Specimens:   ID Type Source Tests Collected by Time Destination   1 : URINE FOR CULTURE Urine Urine, indwelling catheter CULTURE, Via Nara De Dios MD 3/27/2021 7155        Implants:  Implant Name Type Inv.  Item Serial No.  Lot No. LRB No. Used Action   GRAFT NRV REP L3CM DIA4MM TYP 1 CLLGN ABSRB SEMIPERMEABLE  GRAFT NRV REP L3CM DIA4MM TYP 1 CLLGN ABSRB SEMIPERMEABLE  [employer] [id number] Left 1 Implanted   GRAFT NRV REP L3CM DIA3MM TYP 1 CLLGN ABSRB SEMIPERMEABLE  GRAFT NRV REP L3CM DIA3MM TYP 1 CLLGN ABSRB SEMIPERMEABLE  [employer] [id number] Left 1 Implanted         Drains: * No LDAs found *    Findings: Significant muscle swelling and edema noted, very poor outflow from arm, as above    To ICU for every hour neurovascular checks, continue low-dose heparin drip    Thank you,    Electronically signed by Mayte Viveros DO on 3/27/2021 at 8:14 PM

## 2021-03-27 NOTE — PROGRESS NOTES
Ortho contacted vascular surgery team intra-op. Concerns for distal hand ischemia and thrombosis of arteries. Discussed with Dr. Marcus Dickinson attending surgeon on call. Plan for vascular surgery to perform thrombectomy and possible left upper extremity bypass. Discussed with patient's wife and consent obtained. All questions answered.     Postop recommendations to follow    Thank you,    Electronically signed by Ferrell Cockayne, DO on 3/27/2021 at 1:59 PM
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
## 2021-03-28 LAB
ABSOLUTE EOS #: <0.03 K/UL (ref 0–0.44)
ABSOLUTE IMMATURE GRANULOCYTE: 0.06 K/UL (ref 0–0.3)
ABSOLUTE LYMPH #: 1.29 K/UL (ref 1.1–3.7)
ABSOLUTE MONO #: 0.99 K/UL (ref 0.1–1.2)
ANION GAP SERPL CALCULATED.3IONS-SCNC: 7 MMOL/L (ref 9–17)
BASOPHILS # BLD: 0 % (ref 0–2)
BASOPHILS ABSOLUTE: <0.03 K/UL (ref 0–0.2)
BUN BLDV-MCNC: 8 MG/DL (ref 6–20)
BUN/CREAT BLD: ABNORMAL (ref 9–20)
CALCIUM SERPL-MCNC: 7.6 MG/DL (ref 8.6–10.4)
CHLORIDE BLD-SCNC: 100 MMOL/L (ref 98–107)
CO2: 27 MMOL/L (ref 20–31)
CREAT SERPL-MCNC: 0.62 MG/DL (ref 0.7–1.2)
CULTURE: NO GROWTH
DIFFERENTIAL TYPE: ABNORMAL
EOSINOPHILS RELATIVE PERCENT: 0 % (ref 1–4)
GFR AFRICAN AMERICAN: >60 ML/MIN
GFR NON-AFRICAN AMERICAN: >60 ML/MIN
GFR SERPL CREATININE-BSD FRML MDRD: ABNORMAL ML/MIN/{1.73_M2}
GFR SERPL CREATININE-BSD FRML MDRD: ABNORMAL ML/MIN/{1.73_M2}
GLUCOSE BLD-MCNC: 116 MG/DL (ref 75–110)
GLUCOSE BLD-MCNC: 128 MG/DL (ref 75–110)
GLUCOSE BLD-MCNC: 158 MG/DL (ref 75–110)
GLUCOSE BLD-MCNC: 180 MG/DL (ref 70–99)
GLUCOSE BLD-MCNC: 183 MG/DL (ref 75–110)
HCT VFR BLD CALC: 27.4 % (ref 40.7–50.3)
HEMOGLOBIN: 9.2 G/DL (ref 13–17)
IMMATURE GRANULOCYTES: 1 %
LYMPHOCYTES # BLD: 10 % (ref 24–43)
Lab: NORMAL
MAGNESIUM: 2 MG/DL (ref 1.6–2.6)
MCH RBC QN AUTO: 33 PG (ref 25.2–33.5)
MCHC RBC AUTO-ENTMCNC: 33.6 G/DL (ref 28.4–34.8)
MCV RBC AUTO: 98.2 FL (ref 82.6–102.9)
MONOCYTES # BLD: 8 % (ref 3–12)
NRBC AUTOMATED: 0 PER 100 WBC
PARTIAL THROMBOPLASTIN TIME: 49.1 SEC (ref 20.5–30.5)
PARTIAL THROMBOPLASTIN TIME: 50.2 SEC (ref 20.5–30.5)
PARTIAL THROMBOPLASTIN TIME: 51.3 SEC (ref 20.5–30.5)
PARTIAL THROMBOPLASTIN TIME: 51.5 SEC (ref 20.5–30.5)
PDW BLD-RTO: 12.1 % (ref 11.8–14.4)
PLATELET # BLD: 215 K/UL (ref 138–453)
PLATELET ESTIMATE: ABNORMAL
PMV BLD AUTO: 10 FL (ref 8.1–13.5)
POTASSIUM SERPL-SCNC: 3.9 MMOL/L (ref 3.7–5.3)
RBC # BLD: 2.79 M/UL (ref 4.21–5.77)
RBC # BLD: ABNORMAL 10*6/UL
SEG NEUTROPHILS: 81 % (ref 36–65)
SEGMENTED NEUTROPHILS ABSOLUTE COUNT: 10.79 K/UL (ref 1.5–8.1)
SODIUM BLD-SCNC: 134 MMOL/L (ref 135–144)
SPECIMEN DESCRIPTION: NORMAL
WBC # BLD: 13.2 K/UL (ref 3.5–11.3)
WBC # BLD: ABNORMAL 10*3/UL

## 2021-03-28 PROCEDURE — 6370000000 HC RX 637 (ALT 250 FOR IP): Performed by: STUDENT IN AN ORGANIZED HEALTH CARE EDUCATION/TRAINING PROGRAM

## 2021-03-28 PROCEDURE — 97162 PT EVAL MOD COMPLEX 30 MIN: CPT

## 2021-03-28 PROCEDURE — 6360000002 HC RX W HCPCS: Performed by: STUDENT IN AN ORGANIZED HEALTH CARE EDUCATION/TRAINING PROGRAM

## 2021-03-28 PROCEDURE — 85025 COMPLETE CBC W/AUTO DIFF WBC: CPT

## 2021-03-28 PROCEDURE — 36415 COLL VENOUS BLD VENIPUNCTURE: CPT

## 2021-03-28 PROCEDURE — 82947 ASSAY GLUCOSE BLOOD QUANT: CPT

## 2021-03-28 PROCEDURE — 2500000003 HC RX 250 WO HCPCS: Performed by: STUDENT IN AN ORGANIZED HEALTH CARE EDUCATION/TRAINING PROGRAM

## 2021-03-28 PROCEDURE — 80048 BASIC METABOLIC PNL TOTAL CA: CPT

## 2021-03-28 PROCEDURE — 2580000003 HC RX 258: Performed by: STUDENT IN AN ORGANIZED HEALTH CARE EDUCATION/TRAINING PROGRAM

## 2021-03-28 PROCEDURE — 2060000000 HC ICU INTERMEDIATE R&B

## 2021-03-28 PROCEDURE — 90792 PSYCH DIAG EVAL W/MED SRVCS: CPT | Performed by: PSYCHIATRY & NEUROLOGY

## 2021-03-28 PROCEDURE — 85730 THROMBOPLASTIN TIME PARTIAL: CPT

## 2021-03-28 PROCEDURE — 83735 ASSAY OF MAGNESIUM: CPT

## 2021-03-28 PROCEDURE — 97530 THERAPEUTIC ACTIVITIES: CPT

## 2021-03-28 RX ORDER — DEXTROSE MONOHYDRATE 25 G/50ML
12.5 INJECTION, SOLUTION INTRAVENOUS PRN
Status: DISCONTINUED | OUTPATIENT
Start: 2021-03-28 | End: 2021-04-01

## 2021-03-28 RX ORDER — DEXTROSE MONOHYDRATE 50 MG/ML
100 INJECTION, SOLUTION INTRAVENOUS PRN
Status: DISCONTINUED | OUTPATIENT
Start: 2021-03-28 | End: 2021-03-29

## 2021-03-28 RX ORDER — NICOTINE POLACRILEX 4 MG
15 LOZENGE BUCCAL PRN
Status: DISCONTINUED | OUTPATIENT
Start: 2021-03-28 | End: 2021-04-01

## 2021-03-28 RX ORDER — MAGNESIUM SULFATE IN WATER 40 MG/ML
2000 INJECTION, SOLUTION INTRAVENOUS ONCE
Status: COMPLETED | OUTPATIENT
Start: 2021-03-28 | End: 2021-03-28

## 2021-03-28 RX ORDER — POTASSIUM CHLORIDE 20 MEQ/1
20 TABLET, EXTENDED RELEASE ORAL ONCE
Status: COMPLETED | OUTPATIENT
Start: 2021-03-28 | End: 2021-03-28

## 2021-03-28 RX ORDER — POTASSIUM CHLORIDE 600 MG/1
15 TABLET, FILM COATED, EXTENDED RELEASE ORAL 2 TIMES DAILY
Status: DISCONTINUED | OUTPATIENT
Start: 2021-03-28 | End: 2021-03-28

## 2021-03-28 RX ORDER — OXYCODONE HYDROCHLORIDE 5 MG/1
10 TABLET ORAL EVERY 4 HOURS PRN
Status: DISCONTINUED | OUTPATIENT
Start: 2021-03-28 | End: 2021-03-31

## 2021-03-28 RX ADMIN — FAMOTIDINE 20 MG: 10 INJECTION INTRAVENOUS at 08:01

## 2021-03-28 RX ADMIN — FENTANYL CITRATE 100 MCG: 50 INJECTION, SOLUTION INTRAMUSCULAR; INTRAVENOUS at 01:31

## 2021-03-28 RX ADMIN — SODIUM CHLORIDE, PRESERVATIVE FREE 10 ML: 5 INJECTION INTRAVENOUS at 07:59

## 2021-03-28 RX ADMIN — METHOCARBAMOL TABLETS 750 MG: 750 TABLET, COATED ORAL at 08:02

## 2021-03-28 RX ADMIN — METHOCARBAMOL TABLETS 750 MG: 750 TABLET, COATED ORAL at 18:08

## 2021-03-28 RX ADMIN — HEPARIN SODIUM AND DEXTROSE 20 UNITS/KG/HR: 10000; 5 INJECTION INTRAVENOUS at 14:53

## 2021-03-28 RX ADMIN — FENTANYL CITRATE 100 MCG: 50 INJECTION, SOLUTION INTRAMUSCULAR; INTRAVENOUS at 02:20

## 2021-03-28 RX ADMIN — GABAPENTIN 300 MG: 300 CAPSULE ORAL at 08:01

## 2021-03-28 RX ADMIN — FENTANYL CITRATE 100 MCG: 50 INJECTION, SOLUTION INTRAMUSCULAR; INTRAVENOUS at 03:30

## 2021-03-28 RX ADMIN — FENTANYL CITRATE 100 MCG: 50 INJECTION, SOLUTION INTRAMUSCULAR; INTRAVENOUS at 12:22

## 2021-03-28 RX ADMIN — GABAPENTIN 300 MG: 300 CAPSULE ORAL at 13:33

## 2021-03-28 RX ADMIN — FENTANYL CITRATE 100 MCG: 50 INJECTION, SOLUTION INTRAMUSCULAR; INTRAVENOUS at 16:01

## 2021-03-28 RX ADMIN — FENTANYL CITRATE 100 MCG: 50 INJECTION, SOLUTION INTRAMUSCULAR; INTRAVENOUS at 18:08

## 2021-03-28 RX ADMIN — ACETAMINOPHEN 1000 MG: 500 TABLET ORAL at 00:03

## 2021-03-28 RX ADMIN — FENTANYL CITRATE 100 MCG: 50 INJECTION, SOLUTION INTRAMUSCULAR; INTRAVENOUS at 20:14

## 2021-03-28 RX ADMIN — OXYCODONE HYDROCHLORIDE 10 MG: 5 TABLET ORAL at 12:22

## 2021-03-28 RX ADMIN — ACETAMINOPHEN 1000 MG: 500 TABLET ORAL at 13:33

## 2021-03-28 RX ADMIN — DOCUSATE SODIUM 50MG AND SENNOSIDES 8.6MG 1 TABLET: 8.6; 5 TABLET, FILM COATED ORAL at 20:14

## 2021-03-28 RX ADMIN — SODIUM CHLORIDE, PRESERVATIVE FREE 10 ML: 5 INJECTION INTRAVENOUS at 20:19

## 2021-03-28 RX ADMIN — FENTANYL CITRATE 100 MCG: 50 INJECTION, SOLUTION INTRAMUSCULAR; INTRAVENOUS at 11:17

## 2021-03-28 RX ADMIN — POLYETHYLENE GLYCOL 3350 17 G: 17 POWDER, FOR SOLUTION ORAL at 08:01

## 2021-03-28 RX ADMIN — Medication 10 ML: at 11:18

## 2021-03-28 RX ADMIN — FENTANYL CITRATE 100 MCG: 50 INJECTION, SOLUTION INTRAMUSCULAR; INTRAVENOUS at 13:33

## 2021-03-28 RX ADMIN — FENTANYL CITRATE 100 MCG: 50 INJECTION, SOLUTION INTRAMUSCULAR; INTRAVENOUS at 14:48

## 2021-03-28 RX ADMIN — Medication 10 ML: at 13:35

## 2021-03-28 RX ADMIN — FENTANYL CITRATE 100 MCG: 50 INJECTION, SOLUTION INTRAMUSCULAR; INTRAVENOUS at 05:38

## 2021-03-28 RX ADMIN — METHOCARBAMOL TABLETS 750 MG: 750 TABLET, COATED ORAL at 20:14

## 2021-03-28 RX ADMIN — METHOCARBAMOL TABLETS 750 MG: 750 TABLET, COATED ORAL at 12:22

## 2021-03-28 RX ADMIN — ACETAMINOPHEN 1000 MG: 500 TABLET ORAL at 05:38

## 2021-03-28 RX ADMIN — ACETAMINOPHEN 1000 MG: 500 TABLET ORAL at 20:14

## 2021-03-28 RX ADMIN — FENTANYL CITRATE 100 MCG: 50 INJECTION, SOLUTION INTRAMUSCULAR; INTRAVENOUS at 00:03

## 2021-03-28 RX ADMIN — FENTANYL CITRATE 50 MCG: 50 INJECTION, SOLUTION INTRAMUSCULAR; INTRAVENOUS at 10:04

## 2021-03-28 RX ADMIN — FENTANYL CITRATE 100 MCG: 50 INJECTION, SOLUTION INTRAMUSCULAR; INTRAVENOUS at 07:58

## 2021-03-28 RX ADMIN — GABAPENTIN 300 MG: 300 CAPSULE ORAL at 20:14

## 2021-03-28 RX ADMIN — OXYCODONE HYDROCHLORIDE 5 MG: 5 TABLET ORAL at 08:35

## 2021-03-28 RX ADMIN — FAMOTIDINE 20 MG: 10 INJECTION INTRAVENOUS at 20:14

## 2021-03-28 RX ADMIN — MAGNESIUM SULFATE 2000 MG: 2 INJECTION INTRAVENOUS at 05:43

## 2021-03-28 RX ADMIN — DOCUSATE SODIUM 50MG AND SENNOSIDES 8.6MG 1 TABLET: 8.6; 5 TABLET, FILM COATED ORAL at 08:02

## 2021-03-28 RX ADMIN — POTASSIUM CHLORIDE 20 MEQ: 1500 TABLET, EXTENDED RELEASE ORAL at 06:51

## 2021-03-28 ASSESSMENT — PAIN SCALES - GENERAL
PAINLEVEL_OUTOF10: 8
PAINLEVEL_OUTOF10: 7
PAINLEVEL_OUTOF10: 8
PAINLEVEL_OUTOF10: 9
PAINLEVEL_OUTOF10: 5
PAINLEVEL_OUTOF10: 7
PAINLEVEL_OUTOF10: 7
PAINLEVEL_OUTOF10: 8
PAINLEVEL_OUTOF10: 7
PAINLEVEL_OUTOF10: 8
PAINLEVEL_OUTOF10: 8
PAINLEVEL_OUTOF10: 7
PAINLEVEL_OUTOF10: 7

## 2021-03-28 ASSESSMENT — PAIN DESCRIPTION - LOCATION
LOCATION: ARM

## 2021-03-28 ASSESSMENT — PAIN DESCRIPTION - ORIENTATION
ORIENTATION: LEFT

## 2021-03-28 ASSESSMENT — PAIN DESCRIPTION - ONSET
ONSET: ON-GOING

## 2021-03-28 ASSESSMENT — PAIN DESCRIPTION - PROGRESSION
CLINICAL_PROGRESSION: NOT CHANGED

## 2021-03-28 ASSESSMENT — PAIN DESCRIPTION - PAIN TYPE
TYPE: ACUTE PAIN;SURGICAL PAIN
TYPE: SURGICAL PAIN
TYPE: SURGICAL PAIN;ACUTE PAIN
TYPE: SURGICAL PAIN;ACUTE PAIN
TYPE: SURGICAL PAIN
TYPE: ACUTE PAIN;SURGICAL PAIN

## 2021-03-28 ASSESSMENT — PAIN DESCRIPTION - FREQUENCY
FREQUENCY: CONTINUOUS

## 2021-03-28 ASSESSMENT — PAIN DESCRIPTION - DESCRIPTORS
DESCRIPTORS: CONSTANT;SHARP;PRESSURE
DESCRIPTORS: CONSTANT;SHARP
DESCRIPTORS: CONSTANT;PRESSURE;SHARP
DESCRIPTORS: CONSTANT;PRESSURE;STABBING
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
## 2021-03-28 NOTE — PLAN OF CARE
Problem: Pain:  Goal: Pain level will decrease  Description: Pain level will decrease  Outcome: Ongoing  Goal: Control of acute pain  Description: Control of acute pain  Outcome: Ongoing  Goal: Control of chronic pain  Description: Control of chronic pain  Outcome: Ongoing     Problem: Nutrition  Goal: Optimal nutrition therapy  Outcome: Ongoing     Problem: Skin Integrity:  Goal: Will show no infection signs and symptoms  Description: Will show no infection signs and symptoms  Outcome: Ongoing  Goal: Absence of new skin breakdown  Description: Absence of new skin breakdown  Outcome: Ongoing

## 2021-03-28 NOTE — PROGRESS NOTES
the following:    -Left radial and ulnar artery lacerations s/p primary repair on 3/26/21 & subsequent radial/ulnar arter thrombectomy with brachial-radial artery bypass on 3/27/21  -Left interrosseous artery laceration s/p ligation on 3/26/21  -Left median nerve and SBRN s/p repair on 3/27/21  -Left anterior forearm compartment syndrome s/p single compartment fasciotomy on 3/27/21  -Left forearm flexor-pronator and BR muscle belly laceration    -WB status: Ok for range of motion to LUE. No heavy lifting, pushing, or pulling with left upper extremity  -Continue wet to dry dressings to the anterior forearm wound. This will need a likely flap closure for definitive coverage once vascular reperfusion has be adequately sustained. -Trauma team primary, vascular consulted   -Continue around the clock neurovascular checks per vascular  -Pain control per primary  -DVT ppx: Managed per primary. Ok from orthopedics.  Ok heparin.  -Ice PRN  -Encourage Incentive Spirometry use  -PT/OT to eval/treat  -Follow up with Dr. Vidales Manual in office in 10-14 days  -Please page ortho with any questions    Arcenio Alvarez DO  Orthopedic Surgery Resident, PGY-2  [address]

## 2021-03-28 NOTE — CARE COORDINATION
Case Management Initial Discharge Plan  Anshul López,             Met with:patient or spouse/SO to discuss discharge plans. Information verified: address, contacts, phone number, , insurance Yes    Emergency Contact/Next of Kin name & number: Alyssa Councilman (wife) [phone]    PCP: ALLISON Beatty - CNP  Date of last visit: Summer    Insurance Provider: [employer]    Discharge Planning    Living Arrangements:  Spouse/Significant Other, Children   Support Systems:  Spouse/Significant Other, Parent, Children, Family Members    Home has 1 stories  4 stairs to climb to get into front door, stairs to climb to reach second floor  Location of bedroom/bathroom in home main    Patient able to perform ADL's:Independent    Current Services (outpatient & in home) none  DME equipment: none  DME provider: none    Receiving oral anticoagulation therapy? No    If indicated:   Physician managing anticoagulation treatment:   Where does patient obtain lab work for ATC treatment? Potential Assistance Needed:  N/A    Patient agreeable to home care: No  [place] of choice provided:  no    Prior SNF/Rehab Placement and Facility: no  Agreeable to SNF/Rehab: No  [place] of choice provided: no     Evaluation: no    Expected Discharge date:  21    Patient expects to be discharged to:  home  Follow Up Appointment: Best Day/ Time: Tuesday AM    Transportation provider: family  Transportation arrangements needed for discharge: No    Readmission Risk              Risk of Unplanned Readmission:        12             Does patient have a readmission risk score greater than 14?: No  If yes, follow-up appointment must be made within 7 days of discharge.      Goals of Care: increased comfort level      Discharge Plan: psych recommending [facility]          Electronically signed by Sherly Evans RN on 3/28/21 at 5:12 PM EDT
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
## 2021-03-28 NOTE — PROGRESS NOTES
Patient left thumb and index finger pale with delayed capillary refill, last three digits pink with brisk refill. Left arm on pillow.
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
## 2021-03-28 NOTE — PROGRESS NOTES
brachial artery bypass with SVG, ulnar/radial artery thrombectomy with TPA infusion. OBJECTIVE  VITALS: Temp: Temp: 98.7 °F (37.1 °C)Temp  Av.7 °F (37.1 °C)  Min: 97.7 °F (36.5 °C)  Max: 99.3 °F (66.7 °C) BP Systolic (94KVX), TOÑO:914 , Min:75 , NJB:855   Diastolic (13UOW), VW, Min:53, Max:93   Pulse Pulse  Av  Min: 63  Max: 86 Resp Resp  Avg: 15.1  Min: 0  Max: 18 Pulse ox SpO2  Av.1 %  Min: 94 %  Max: 100 %    Physical Exam  Constitutional:      alert and cooperative  HENT:      Head: normocephalic, atraumatic    Eyes:      Pupils: equal round and reactive to light   Cardiovascular:      Rate and Rhythm: RRR     Pulses: (R) RP 2+, (L) RP not present on doppler (was not able to found s/p OR trip), DP pulses are 2+ bilaterally   Pulmonary:      Effort: no resp distress     Breath sounds: CTA bilaterally  Abdominal:      General: non distended, normal BS     Palpations: soft, non tender   Skin:     General: Warm and dry. Left digits have sluggish cap refill. LAB:  CBC:   Recent Labs     21  1236 21  0503 21  0341   WBC 15.5* 11.7*  --  13.2*   HGB 12.5* 9.7* 9.0* 9.2*   HCT 36.3* 30.4* 27.2* 27.4*   MCV 94.8 100.7  --  98.2    216  --  215     BMP:   Recent Labs     21  0503 21  0341   * 137 134*   K 3.7 4.4 3.9    105 100   CO2    BUN 12 7 8   CREATININE 0.69* 0.63* 0.62*   GLUCOSE 112* 126* 180*         RADIOLOGY:  CXR: 3/26  Impression   1. No acute fracture or dislocation. 2. Large skin and subcutaneous tissue defect with lucencies in the volar   aspect of the proximal forearm representing laceration.  Multiple punctate   opacities may represent dressings or non iatrogenic foreign body. 3. Lungs clear.        Elio Kidd MD  3/28/2021  5:09 AM        Trauma Attending Jarred Yi I have reviewed the above GCS note(s) and confirmed the key elements of the medical history and physical exam. I have seen and examined the pt. I have discussed the findings, established the care plan and recommendations with Resident, GCS RN, bedside nurse.   On hep gtt   Ortho and vascular following closely   Neuro/vascular checks q 2   Pain increased will inc harshad to 10mg q4-6   Ok to transfer to stepdown/floor       Beatriz Greenberg DO  3/28/2021  3:10 PM

## 2021-03-28 NOTE — PLAN OF CARE
Problem: Pain:  Goal: Pain level will decrease  Description: Pain level will decrease  3/28/2021 1500 by Abdias Muñoz RN  Outcome: Ongoing  3/28/2021 0621 by Colin Hudson RN  Outcome: Ongoing  Goal: Control of acute pain  Description: Control of acute pain  3/28/2021 1500 by Abdias Muñoz RN  Outcome: Ongoing  3/28/2021 0621 by Colin Hudson RN  Outcome: Ongoing  Goal: Control of chronic pain  Description: Control of chronic pain  3/28/2021 1500 by Abdias Muñoz RN  Outcome: Ongoing  3/28/2021 0621 by Colin Hudson RN  Outcome: Ongoing     Problem: Nutrition  Goal: Optimal nutrition therapy  3/28/2021 1500 by Abdias Muñoz RN  Outcome: Ongoing  3/28/2021 0621 by Colin Hudson RN  Outcome: Ongoing     Problem: Skin Integrity:  Goal: Will show no infection signs and symptoms  Description: Will show no infection signs and symptoms  3/28/2021 1500 by Abdias Muñoz RN  Outcome: Ongoing  3/28/2021 0621 by Colin Hudson RN  Outcome: Ongoing  Goal: Absence of new skin breakdown  Description: Absence of new skin breakdown  3/28/2021 1500 by Abdias Muñoz RN  Outcome: Ongoing  3/28/2021 0621 by Colin Hudson RN  Outcome: Ongoing
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
## 2021-03-28 NOTE — PROGRESS NOTES
Noted improvement to left thumb and index finger, now pink with brisk capillary refill and warm to touch.

## 2021-03-29 ENCOUNTER — APPOINTMENT (OUTPATIENT)
Dept: GENERAL RADIOLOGY | Age: 41
DRG: 904 | End: 2021-03-29
Payer: COMMERCIAL

## 2021-03-29 PROBLEM — S54.22XA: Status: ACTIVE | Noted: 2021-03-29

## 2021-03-29 PROBLEM — D62 ACUTE BLOOD LOSS ANEMIA: Status: ACTIVE | Noted: 2021-03-29

## 2021-03-29 PROBLEM — F23 ACUTE PSYCHOSIS (HCC): Status: ACTIVE | Noted: 2021-03-29

## 2021-03-29 PROBLEM — S54.12XA LACERATION OF LEFT MEDIAN NERVE: Status: ACTIVE | Noted: 2021-03-29

## 2021-03-29 PROBLEM — R46.89 AGGRESSION: Status: ACTIVE | Noted: 2021-03-29

## 2021-03-29 LAB
ABSOLUTE EOS #: 0.04 K/UL (ref 0–0.44)
ABSOLUTE IMMATURE GRANULOCYTE: 0.05 K/UL (ref 0–0.3)
ABSOLUTE LYMPH #: 1.11 K/UL (ref 1.1–3.7)
ABSOLUTE MONO #: 1.26 K/UL (ref 0.1–1.2)
ANION GAP SERPL CALCULATED.3IONS-SCNC: 6 MMOL/L (ref 9–17)
BASOPHILS # BLD: 0 % (ref 0–2)
BASOPHILS ABSOLUTE: <0.03 K/UL (ref 0–0.2)
BUN BLDV-MCNC: 8 MG/DL (ref 6–20)
BUN/CREAT BLD: ABNORMAL (ref 9–20)
CALCIUM SERPL-MCNC: 8 MG/DL (ref 8.6–10.4)
CHLORIDE BLD-SCNC: 100 MMOL/L (ref 98–107)
CO2: 26 MMOL/L (ref 20–31)
CREAT SERPL-MCNC: 0.54 MG/DL (ref 0.7–1.2)
DIFFERENTIAL TYPE: ABNORMAL
EOSINOPHILS RELATIVE PERCENT: 0 % (ref 1–4)
GFR AFRICAN AMERICAN: >60 ML/MIN
GFR NON-AFRICAN AMERICAN: >60 ML/MIN
GFR SERPL CREATININE-BSD FRML MDRD: ABNORMAL ML/MIN/{1.73_M2}
GFR SERPL CREATININE-BSD FRML MDRD: ABNORMAL ML/MIN/{1.73_M2}
GLUCOSE BLD-MCNC: 117 MG/DL (ref 75–110)
GLUCOSE BLD-MCNC: 119 MG/DL (ref 75–110)
GLUCOSE BLD-MCNC: 120 MG/DL (ref 70–99)
GLUCOSE BLD-MCNC: 121 MG/DL (ref 75–110)
GLUCOSE BLD-MCNC: 131 MG/DL (ref 75–110)
HCT VFR BLD CALC: 25.2 % (ref 40.7–50.3)
HEMOGLOBIN: 8.6 G/DL (ref 13–17)
IMMATURE GRANULOCYTES: 0 %
LYMPHOCYTES # BLD: 8 % (ref 24–43)
MAGNESIUM: 2 MG/DL (ref 1.6–2.6)
MCH RBC QN AUTO: 33.3 PG (ref 25.2–33.5)
MCHC RBC AUTO-ENTMCNC: 34.1 G/DL (ref 28.4–34.8)
MCV RBC AUTO: 97.7 FL (ref 82.6–102.9)
MONOCYTES # BLD: 9 % (ref 3–12)
NRBC AUTOMATED: 0 PER 100 WBC
PARTIAL THROMBOPLASTIN TIME: 43.1 SEC (ref 20.5–30.5)
PARTIAL THROMBOPLASTIN TIME: 44.2 SEC (ref 20.5–30.5)
PARTIAL THROMBOPLASTIN TIME: 50.9 SEC (ref 20.5–30.5)
PARTIAL THROMBOPLASTIN TIME: 51.4 SEC (ref 20.5–30.5)
PDW BLD-RTO: 12.1 % (ref 11.8–14.4)
PLATELET # BLD: 211 K/UL (ref 138–453)
PLATELET ESTIMATE: ABNORMAL
PMV BLD AUTO: 9.4 FL (ref 8.1–13.5)
POTASSIUM SERPL-SCNC: 4.1 MMOL/L (ref 3.7–5.3)
RBC # BLD: 2.58 M/UL (ref 4.21–5.77)
RBC # BLD: ABNORMAL 10*6/UL
SEG NEUTROPHILS: 83 % (ref 36–65)
SEGMENTED NEUTROPHILS ABSOLUTE COUNT: 11.02 K/UL (ref 1.5–8.1)
SODIUM BLD-SCNC: 132 MMOL/L (ref 135–144)
TROPONIN INTERP: NORMAL
TROPONIN T: NORMAL NG/ML
TROPONIN, HIGH SENSITIVITY: <6 NG/L (ref 0–22)
WBC # BLD: 13.5 K/UL (ref 3.5–11.3)
WBC # BLD: ABNORMAL 10*3/UL

## 2021-03-29 PROCEDURE — 85025 COMPLETE CBC W/AUTO DIFF WBC: CPT

## 2021-03-29 PROCEDURE — 80048 BASIC METABOLIC PNL TOTAL CA: CPT

## 2021-03-29 PROCEDURE — 97116 GAIT TRAINING THERAPY: CPT

## 2021-03-29 PROCEDURE — 85730 THROMBOPLASTIN TIME PARTIAL: CPT

## 2021-03-29 PROCEDURE — 6370000000 HC RX 637 (ALT 250 FOR IP): Performed by: STUDENT IN AN ORGANIZED HEALTH CARE EDUCATION/TRAINING PROGRAM

## 2021-03-29 PROCEDURE — 2500000003 HC RX 250 WO HCPCS: Performed by: STUDENT IN AN ORGANIZED HEALTH CARE EDUCATION/TRAINING PROGRAM

## 2021-03-29 PROCEDURE — 83735 ASSAY OF MAGNESIUM: CPT

## 2021-03-29 PROCEDURE — 82947 ASSAY GLUCOSE BLOOD QUANT: CPT

## 2021-03-29 PROCEDURE — 97535 SELF CARE MNGMENT TRAINING: CPT

## 2021-03-29 PROCEDURE — 84484 ASSAY OF TROPONIN QUANT: CPT

## 2021-03-29 PROCEDURE — 6360000002 HC RX W HCPCS: Performed by: STUDENT IN AN ORGANIZED HEALTH CARE EDUCATION/TRAINING PROGRAM

## 2021-03-29 PROCEDURE — 93005 ELECTROCARDIOGRAM TRACING: CPT | Performed by: NURSE PRACTITIONER

## 2021-03-29 PROCEDURE — 2060000000 HC ICU INTERMEDIATE R&B

## 2021-03-29 PROCEDURE — 71045 X-RAY EXAM CHEST 1 VIEW: CPT

## 2021-03-29 PROCEDURE — 97166 OT EVAL MOD COMPLEX 45 MIN: CPT

## 2021-03-29 PROCEDURE — 36415 COLL VENOUS BLD VENIPUNCTURE: CPT

## 2021-03-29 PROCEDURE — 6370000000 HC RX 637 (ALT 250 FOR IP): Performed by: NURSE PRACTITIONER

## 2021-03-29 PROCEDURE — 2580000003 HC RX 258: Performed by: STUDENT IN AN ORGANIZED HEALTH CARE EDUCATION/TRAINING PROGRAM

## 2021-03-29 RX ORDER — GABAPENTIN 300 MG/1
300 CAPSULE ORAL EVERY 8 HOURS SCHEDULED
Status: DISCONTINUED | OUTPATIENT
Start: 2021-03-29 | End: 2021-04-01

## 2021-03-29 RX ORDER — METHOCARBAMOL 750 MG/1
750 TABLET, FILM COATED ORAL EVERY 6 HOURS SCHEDULED
Status: DISCONTINUED | OUTPATIENT
Start: 2021-03-29 | End: 2021-04-14

## 2021-03-29 RX ADMIN — GABAPENTIN 300 MG: 300 CAPSULE ORAL at 21:13

## 2021-03-29 RX ADMIN — SODIUM CHLORIDE, PRESERVATIVE FREE 10 ML: 5 INJECTION INTRAVENOUS at 08:37

## 2021-03-29 RX ADMIN — FENTANYL CITRATE 100 MCG: 50 INJECTION, SOLUTION INTRAMUSCULAR; INTRAVENOUS at 01:48

## 2021-03-29 RX ADMIN — OXYCODONE HYDROCHLORIDE 10 MG: 5 TABLET ORAL at 17:03

## 2021-03-29 RX ADMIN — POLYETHYLENE GLYCOL 3350 17 G: 17 POWDER, FOR SOLUTION ORAL at 08:37

## 2021-03-29 RX ADMIN — DOCUSATE SODIUM 50MG AND SENNOSIDES 8.6MG 1 TABLET: 8.6; 5 TABLET, FILM COATED ORAL at 21:13

## 2021-03-29 RX ADMIN — GABAPENTIN 300 MG: 300 CAPSULE ORAL at 14:40

## 2021-03-29 RX ADMIN — ACETAMINOPHEN 1000 MG: 500 TABLET ORAL at 21:13

## 2021-03-29 RX ADMIN — OXYCODONE HYDROCHLORIDE 10 MG: 5 TABLET ORAL at 21:07

## 2021-03-29 RX ADMIN — DOCUSATE SODIUM 50MG AND SENNOSIDES 8.6MG 1 TABLET: 8.6; 5 TABLET, FILM COATED ORAL at 08:37

## 2021-03-29 RX ADMIN — METHOCARBAMOL TABLETS 750 MG: 750 TABLET, COATED ORAL at 11:54

## 2021-03-29 RX ADMIN — FENTANYL CITRATE 100 MCG: 50 INJECTION, SOLUTION INTRAMUSCULAR; INTRAVENOUS at 05:01

## 2021-03-29 RX ADMIN — OXYCODONE HYDROCHLORIDE 10 MG: 5 TABLET ORAL at 08:36

## 2021-03-29 RX ADMIN — ACETAMINOPHEN 1000 MG: 500 TABLET ORAL at 14:40

## 2021-03-29 RX ADMIN — METHOCARBAMOL TABLETS 750 MG: 750 TABLET, COATED ORAL at 17:03

## 2021-03-29 RX ADMIN — SODIUM CHLORIDE, PRESERVATIVE FREE 10 ML: 5 INJECTION INTRAVENOUS at 21:14

## 2021-03-29 RX ADMIN — HEPARIN SODIUM 2000 UNITS: 1000 INJECTION INTRAVENOUS; SUBCUTANEOUS at 22:17

## 2021-03-29 RX ADMIN — GABAPENTIN 300 MG: 300 CAPSULE ORAL at 08:37

## 2021-03-29 RX ADMIN — HEPARIN SODIUM 2000 UNITS: 1000 INJECTION INTRAVENOUS; SUBCUTANEOUS at 08:35

## 2021-03-29 RX ADMIN — ACETAMINOPHEN 1000 MG: 500 TABLET ORAL at 05:01

## 2021-03-29 RX ADMIN — FAMOTIDINE 20 MG: 10 INJECTION INTRAVENOUS at 08:36

## 2021-03-29 RX ADMIN — HEPARIN SODIUM AND DEXTROSE 22 UNITS/KG/HR: 10000; 5 INJECTION INTRAVENOUS at 09:44

## 2021-03-29 RX ADMIN — METHOCARBAMOL TABLETS 750 MG: 750 TABLET, COATED ORAL at 08:37

## 2021-03-29 RX ADMIN — OXYCODONE HYDROCHLORIDE 10 MG: 5 TABLET ORAL at 13:01

## 2021-03-29 ASSESSMENT — PAIN DESCRIPTION - DESCRIPTORS
DESCRIPTORS: ACHING
DESCRIPTORS: CONSTANT;ACHING

## 2021-03-29 ASSESSMENT — PAIN SCALES - GENERAL
PAINLEVEL_OUTOF10: 6
PAINLEVEL_OUTOF10: 7
PAINLEVEL_OUTOF10: 0
PAINLEVEL_OUTOF10: 9
PAINLEVEL_OUTOF10: 0
PAINLEVEL_OUTOF10: 2
PAINLEVEL_OUTOF10: 8
PAINLEVEL_OUTOF10: 9
PAINLEVEL_OUTOF10: 9

## 2021-03-29 ASSESSMENT — PAIN DESCRIPTION - ONSET
ONSET: ON-GOING
ONSET: ON-GOING

## 2021-03-29 ASSESSMENT — PAIN DESCRIPTION - LOCATION
LOCATION: ARM
LOCATION: ARM

## 2021-03-29 ASSESSMENT — PAIN DESCRIPTION - PAIN TYPE
TYPE: ACUTE PAIN;SURGICAL PAIN
TYPE: ACUTE PAIN;SURGICAL PAIN
TYPE: SURGICAL PAIN
TYPE: SURGICAL PAIN

## 2021-03-29 ASSESSMENT — PAIN DESCRIPTION - FREQUENCY
FREQUENCY: CONTINUOUS
FREQUENCY: CONTINUOUS

## 2021-03-29 ASSESSMENT — PAIN DESCRIPTION - ORIENTATION
ORIENTATION: LEFT

## 2021-03-29 ASSESSMENT — PAIN DESCRIPTION - PROGRESSION
CLINICAL_PROGRESSION: NOT CHANGED
CLINICAL_PROGRESSION: NOT CHANGED

## 2021-03-29 ASSESSMENT — PAIN - FUNCTIONAL ASSESSMENT: PAIN_FUNCTIONAL_ASSESSMENT: PREVENTS OR INTERFERES SOME ACTIVE ACTIVITIES AND ADLS

## 2021-03-29 NOTE — PLAN OF CARE
Problem: Pain:  Goal: Pain level will decrease  Description: Pain level will decrease  3/29/2021 1641 by Rose Sterling RN  Outcome: Ongoing     Problem: Pain:  Goal: Control of acute pain  Description: Control of acute pain  3/29/2021 1641 by Rose Sterling RN  Outcome: Ongoing     Problem: Pain:  Goal: Control of chronic pain  Description: Control of chronic pain  3/29/2021 1641 by Rose Sterling RN  Outcome: Ongoing     Problem: Nutrition  Goal: Optimal nutrition therapy  3/29/2021 1641 by Rose Sterling RN  Outcome: Ongoing     Problem: Skin Integrity:  Goal: Will show no infection signs and symptoms  Description: Will show no infection signs and symptoms  3/29/2021 1641 by Rose Sterling RN  Outcome: Ongoing     Problem: Skin Integrity:  Goal: Absence of new skin breakdown  Description: Absence of new skin breakdown  3/29/2021 1641 by Rose Sterling RN  Outcome: Ongoing

## 2021-03-29 NOTE — PROGRESS NOTES
Progress Note    Patient:  Carlos Manuel Castañeda  YOB: 1980     47 y.o. male    Subjective:  Patient seen and examined at bedside this morning. No new complaints or concerns per patient this morning. Patient states he does have some epigastric abdominal pain which has been present over several days. No associated nausea/vomiting and is able to tolerate food. Is trying to work on finger ROM. Still with minimal sensation at best to digits 1-3 in the LUE. No acute issues overnight per nursing. Is still experiencing pain, but controlled with medication. Denies: fever/chills, HA, CP, SOB, N/V, dysuria, or numbness/tingling in extremities. No BM/is passing flatus. Objective:   Vitals:    03/28/21 2346   BP: 125/71   Pulse: 94   Resp: 19   Temp: 98.8 °F (37.1 °C)   SpO2: 98%     Gen: NAD, cooperative   Cardiovascular: Regular rate  Respiratory: no audible wheezing, symmetrical chest expansion     MSK: Left upper extremity: Dressings on, which were saturated at time of evaluation and removed. Muscles significantly swollen at fasciotomy site. Surrounding tissues swollen, but compressible. Tenderness to palpation along anterior forearm. Early necrosis with no perfusion to distal phalanx of thumb. Sluggish capillary refill along hand and distal digits. Nonpalpable radial/ulnar pulse. Dopplerable ulnar pulse but not able to appreciate dopplerable radial pulse. Able to minimally move digits at MCPJ. Insensate along thumb and index finger. Minimal sensation at middle finger. Dysesthesias along digits 4-5. Recent Labs     03/26/21  0726 03/26/21  0726 03/28/21  0341   WBC 7.0   < > 13.2*   HGB 13.8   < > 9.2*   HCT 41.7   < > 27.4*      < > 215   INR 1.1  --   --       < > 134*   K 3.8   < > 3.9   BUN 26*   < > 8   CREATININE 1.00   < > 0.62*   GLUCOSE 169*   < > 180*    < > = values in this interval not displayed. Meds: Heparin.     See rec for complete list    Impression: flexion of 1st-3rd digits. Continue BID dressings changes. Will need flap coverage of soft tissue defect. Please page ortho with questions.      Rande Schirmer, DO  Orthopedic Surgery Resident, PGY-2  [address]

## 2021-03-29 NOTE — PROGRESS NOTES
Vascular Surgery Progress Note         PATIENT NAME: Nimisha Lambert     TODAY'S DATE: 3/29/2021, 4:28 AM    CC: Laceration to LUE     SUBJECTIVE:    Pt seen and examined. Still having pain in left upper extremity. Notes some epigastric abdominal pain this AM.   Tolerating oral intake. On heparin for anticoagulation   Dopplerable ulnar pulse. Not appreciable radial pulse. OBJECTIVE:   Vitals:  /71   Pulse 94   Temp 98.8 °F (37.1 °C) (Temporal)   Resp 19   Ht 5' 8\" (1.727 m)   Wt 151 lb 10.8 oz (68.8 kg)   SpO2 98%   BMI 23.06 kg/m²      INTAKE/OUTPUT:      Intake/Output Summary (Last 24 hours) at 3/29/2021 0428  Last data filed at 3/28/2021 1600  Gross per 24 hour   Intake 113 ml   Output 435 ml   Net -322 ml                 GENERAL: AOx3, NAD  HEENT: EOMI bilaterally  CARDIAC: Regular rate and rhythm. ABDOMEN: Soft, NT   EXTREMITY: Left upper extremity dressing removed for evaluation. Exposed muscle and tendon at fasciotomy site. Doppler signal to ulnar artery. Compartments tight but compressible. Replaced dressing after evaluation.    INCISION: Dressing saturated - changed after evaluation this AM.             Data:  CBC with Differential:    Lab Results   Component Value Date    WBC 13.2 03/28/2021    RBC 2.79 03/28/2021    HGB 9.2 03/28/2021    HCT 27.4 03/28/2021     03/28/2021    MCV 98.2 03/28/2021    MCH 33.0 03/28/2021    MCHC 33.6 03/28/2021    RDW 12.1 03/28/2021    LYMPHOPCT 10 03/28/2021    MONOPCT 8 03/28/2021    BASOPCT 0 03/28/2021    MONOSABS 0.99 03/28/2021    LYMPHSABS 1.29 03/28/2021    EOSABS <0.03 03/28/2021    BASOSABS <0.03 03/28/2021    DIFFTYPE NOT REPORTED 03/28/2021     BMP:    Lab Results   Component Value Date     03/28/2021    K 3.9 03/28/2021     03/28/2021    CO2 27 03/28/2021    BUN 8 03/28/2021    CREATININE 0.62 03/28/2021    CALCIUM 7.6 03/28/2021    GFRAA >60 03/28/2021    LABGLOM >60 03/28/2021    GLUCOSE 180 03/28/2021
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
## 2021-03-29 NOTE — PROGRESS NOTES
Nutrition Interventions:   Food and/or Nutrient Delivery:  Continue Current Diet, Continue Oral Nutrition Supplement  Nutrition Education/Counseling:  No recommendation at this time   Coordination of Nutrition Care:  Continue to monitor while inpatient    Goals:  Oral intakes to meet % of estimated nutrition needs.        Nutrition Monitoring and Evaluation:   Food/Nutrient Intake Outcomes:  Food and Nutrient Intake, Supplement Intake  Physical Signs/Symptoms Outcomes:  Biochemical Data, GI Status, Fluid Status or Edema, Nutrition Focused Physical Findings, Skin, Weight     Electronically signed by Solis Hillman RD, LD on 3/29/21 at 12:31 PM EDT    Contact: [phone]

## 2021-03-29 NOTE — FLOWSHEET NOTE
Assessment.  was rounding on the floor and initiated a visit. Jessica Conn and his wife welcomed the . They both report doing \"good. \" Patient is [employer] and says that praying helps him through this. Patient says that positive thinking also helps him. Intervention.  prayed for hope and healing. Outcome. Connection to the \[facility]\""[facility] Community. Gratitude. Plan. Chaplains will remain available to offer spiritual and emotional support as needed. 03/29/21 1743   Encounter Summary   Services provided to: Patient and family together   Referral/Consult From: [facility] Family members   Continue Visiting   (3/29/2021)   Complexity of Encounter High   Length of Encounter 30 minutes   Spiritual Assessment Completed Yes   Routine   Type Follow up   Assessment Calm; Approachable   Intervention Active listening;Prayer;Sustaining presence/ Ministry of presence   Outcome Coping;Tearful;Engaged in conversation

## 2021-03-29 NOTE — PLAN OF CARE
Nutrition Problem #1: Increased nutrient needs  Intervention: Food and/or Nutrient Delivery: Continue Current Diet, Continue Oral Nutrition Supplement  Nutritional Goals: Oral intakes to meet % of estimated nutrition needs.
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
## 2021-03-30 LAB
ABSOLUTE EOS #: 0 K/UL (ref 0–0.44)
ABSOLUTE IMMATURE GRANULOCYTE: 0 K/UL (ref 0–0.3)
ABSOLUTE LYMPH #: 1.13 K/UL (ref 1.1–3.7)
ABSOLUTE MONO #: 1.62 K/UL (ref 0.1–1.2)
ANION GAP SERPL CALCULATED.3IONS-SCNC: 8 MMOL/L (ref 9–17)
BASOPHILS # BLD: 0 % (ref 0–2)
BASOPHILS ABSOLUTE: 0 K/UL (ref 0–0.2)
BUN BLDV-MCNC: 8 MG/DL (ref 6–20)
BUN/CREAT BLD: ABNORMAL (ref 9–20)
CALCIUM SERPL-MCNC: 8.4 MG/DL (ref 8.6–10.4)
CHLORIDE BLD-SCNC: 101 MMOL/L (ref 98–107)
CO2: 24 MMOL/L (ref 20–31)
CREAT SERPL-MCNC: 0.58 MG/DL (ref 0.7–1.2)
DIFFERENTIAL TYPE: ABNORMAL
EKG ATRIAL RATE: 87 BPM
EKG P AXIS: 68 DEGREES
EKG P-R INTERVAL: 126 MS
EKG Q-T INTERVAL: 378 MS
EKG QRS DURATION: 108 MS
EKG QTC CALCULATION (BAZETT): 454 MS
EKG R AXIS: 82 DEGREES
EKG T AXIS: 72 DEGREES
EKG VENTRICULAR RATE: 87 BPM
EOSINOPHILS RELATIVE PERCENT: 0 % (ref 1–4)
GFR AFRICAN AMERICAN: >60 ML/MIN
GFR NON-AFRICAN AMERICAN: >60 ML/MIN
GFR SERPL CREATININE-BSD FRML MDRD: ABNORMAL ML/MIN/{1.73_M2}
GFR SERPL CREATININE-BSD FRML MDRD: ABNORMAL ML/MIN/{1.73_M2}
GLUCOSE BLD-MCNC: 101 MG/DL (ref 75–110)
GLUCOSE BLD-MCNC: 117 MG/DL (ref 70–99)
GLUCOSE BLD-MCNC: 123 MG/DL (ref 75–110)
GLUCOSE BLD-MCNC: 136 MG/DL (ref 75–110)
GLUCOSE BLD-MCNC: 138 MG/DL (ref 75–110)
HCT VFR BLD CALC: 24.1 % (ref 40.7–50.3)
HEMOGLOBIN: 7.6 G/DL (ref 13–17)
IMMATURE GRANULOCYTES: 0 %
LYMPHOCYTES # BLD: 7 % (ref 24–43)
MCH RBC QN AUTO: 32.6 PG (ref 25.2–33.5)
MCHC RBC AUTO-ENTMCNC: 31.5 G/DL (ref 28.4–34.8)
MCV RBC AUTO: 103.4 FL (ref 82.6–102.9)
MONOCYTES # BLD: 10 % (ref 3–12)
MORPHOLOGY: ABNORMAL
MORPHOLOGY: ABNORMAL
NRBC AUTOMATED: 0 PER 100 WBC
PARTIAL THROMBOPLASTIN TIME: 48.6 SEC (ref 20.5–30.5)
PARTIAL THROMBOPLASTIN TIME: 50.1 SEC (ref 20.5–30.5)
PARTIAL THROMBOPLASTIN TIME: 53.4 SEC (ref 20.5–30.5)
PDW BLD-RTO: 12.3 % (ref 11.8–14.4)
PLATELET # BLD: 237 K/UL (ref 138–453)
PLATELET ESTIMATE: ABNORMAL
PMV BLD AUTO: 10.2 FL (ref 8.1–13.5)
POTASSIUM SERPL-SCNC: 4.4 MMOL/L (ref 3.7–5.3)
RBC # BLD: 2.33 M/UL (ref 4.21–5.77)
RBC # BLD: ABNORMAL 10*6/UL
SEG NEUTROPHILS: 83 % (ref 36–65)
SEGMENTED NEUTROPHILS ABSOLUTE COUNT: 13.45 K/UL (ref 1.5–8.1)
SODIUM BLD-SCNC: 133 MMOL/L (ref 135–144)
WBC # BLD: 16.2 K/UL (ref 3.5–11.3)
WBC # BLD: ABNORMAL 10*3/UL

## 2021-03-30 PROCEDURE — 93010 ELECTROCARDIOGRAM REPORT: CPT | Performed by: INTERNAL MEDICINE

## 2021-03-30 PROCEDURE — 6370000000 HC RX 637 (ALT 250 FOR IP): Performed by: NURSE PRACTITIONER

## 2021-03-30 PROCEDURE — 80048 BASIC METABOLIC PNL TOTAL CA: CPT

## 2021-03-30 PROCEDURE — 82947 ASSAY GLUCOSE BLOOD QUANT: CPT

## 2021-03-30 PROCEDURE — 6370000000 HC RX 637 (ALT 250 FOR IP): Performed by: STUDENT IN AN ORGANIZED HEALTH CARE EDUCATION/TRAINING PROGRAM

## 2021-03-30 PROCEDURE — 2060000000 HC ICU INTERMEDIATE R&B

## 2021-03-30 PROCEDURE — 85730 THROMBOPLASTIN TIME PARTIAL: CPT

## 2021-03-30 PROCEDURE — 6360000002 HC RX W HCPCS: Performed by: STUDENT IN AN ORGANIZED HEALTH CARE EDUCATION/TRAINING PROGRAM

## 2021-03-30 PROCEDURE — 36415 COLL VENOUS BLD VENIPUNCTURE: CPT

## 2021-03-30 PROCEDURE — 2580000003 HC RX 258: Performed by: STUDENT IN AN ORGANIZED HEALTH CARE EDUCATION/TRAINING PROGRAM

## 2021-03-30 PROCEDURE — 85025 COMPLETE CBC W/AUTO DIFF WBC: CPT

## 2021-03-30 RX ADMIN — METHOCARBAMOL TABLETS 750 MG: 750 TABLET, COATED ORAL at 18:24

## 2021-03-30 RX ADMIN — METHOCARBAMOL TABLETS 750 MG: 750 TABLET, COATED ORAL at 04:52

## 2021-03-30 RX ADMIN — HEPARIN SODIUM AND DEXTROSE 24 UNITS/KG/HR: 10000; 5 INJECTION INTRAVENOUS at 00:57

## 2021-03-30 RX ADMIN — ACETAMINOPHEN 1000 MG: 500 TABLET ORAL at 14:01

## 2021-03-30 RX ADMIN — OXYCODONE HYDROCHLORIDE 10 MG: 5 TABLET ORAL at 04:52

## 2021-03-30 RX ADMIN — HEPARIN SODIUM AND DEXTROSE 26 UNITS/KG/HR: 10000; 5 INJECTION INTRAVENOUS at 14:00

## 2021-03-30 RX ADMIN — GABAPENTIN 300 MG: 300 CAPSULE ORAL at 14:01

## 2021-03-30 RX ADMIN — GABAPENTIN 300 MG: 300 CAPSULE ORAL at 04:51

## 2021-03-30 RX ADMIN — HEPARIN SODIUM AND DEXTROSE 26 UNITS/KG/HR: 10000; 5 INJECTION INTRAVENOUS at 06:51

## 2021-03-30 RX ADMIN — DOCUSATE SODIUM 50MG AND SENNOSIDES 8.6MG 1 TABLET: 8.6; 5 TABLET, FILM COATED ORAL at 20:54

## 2021-03-30 RX ADMIN — SODIUM CHLORIDE, PRESERVATIVE FREE 10 ML: 5 INJECTION INTRAVENOUS at 08:31

## 2021-03-30 RX ADMIN — POLYETHYLENE GLYCOL 3350 17 G: 17 POWDER, FOR SOLUTION ORAL at 08:30

## 2021-03-30 RX ADMIN — METHOCARBAMOL TABLETS 750 MG: 750 TABLET, COATED ORAL at 00:57

## 2021-03-30 RX ADMIN — OXYCODONE HYDROCHLORIDE 10 MG: 5 TABLET ORAL at 18:25

## 2021-03-30 RX ADMIN — METHOCARBAMOL TABLETS 750 MG: 750 TABLET, COATED ORAL at 11:29

## 2021-03-30 RX ADMIN — OXYCODONE HYDROCHLORIDE 10 MG: 5 TABLET ORAL at 22:34

## 2021-03-30 RX ADMIN — HEPARIN SODIUM 2000 UNITS: 1000 INJECTION INTRAVENOUS; SUBCUTANEOUS at 06:50

## 2021-03-30 RX ADMIN — ACETAMINOPHEN 1000 MG: 500 TABLET ORAL at 20:55

## 2021-03-30 RX ADMIN — GABAPENTIN 300 MG: 300 CAPSULE ORAL at 20:55

## 2021-03-30 RX ADMIN — SODIUM CHLORIDE, PRESERVATIVE FREE 10 ML: 5 INJECTION INTRAVENOUS at 20:55

## 2021-03-30 RX ADMIN — OXYCODONE HYDROCHLORIDE 10 MG: 5 TABLET ORAL at 00:57

## 2021-03-30 RX ADMIN — ACETAMINOPHEN 1000 MG: 500 TABLET ORAL at 04:52

## 2021-03-30 RX ADMIN — DOCUSATE SODIUM 50MG AND SENNOSIDES 8.6MG 1 TABLET: 8.6; 5 TABLET, FILM COATED ORAL at 08:30

## 2021-03-30 RX ADMIN — OXYCODONE HYDROCHLORIDE 10 MG: 5 TABLET ORAL at 08:56

## 2021-03-30 ASSESSMENT — PAIN SCALES - GENERAL
PAINLEVEL_OUTOF10: 7
PAINLEVEL_OUTOF10: 7
PAINLEVEL_OUTOF10: 4
PAINLEVEL_OUTOF10: 0
PAINLEVEL_OUTOF10: 0
PAINLEVEL_OUTOF10: 7
PAINLEVEL_OUTOF10: 7

## 2021-03-30 ASSESSMENT — PAIN DESCRIPTION - ONSET
ONSET: ON-GOING
ONSET: ON-GOING

## 2021-03-30 ASSESSMENT — PAIN DESCRIPTION - DESCRIPTORS
DESCRIPTORS: ACHING

## 2021-03-30 ASSESSMENT — PAIN DESCRIPTION - FREQUENCY
FREQUENCY: CONTINUOUS

## 2021-03-30 ASSESSMENT — PAIN DESCRIPTION - PROGRESSION
CLINICAL_PROGRESSION: GRADUALLY IMPROVING
CLINICAL_PROGRESSION: GRADUALLY IMPROVING

## 2021-03-30 ASSESSMENT — PAIN DESCRIPTION - PAIN TYPE: TYPE: SURGICAL PAIN

## 2021-03-30 ASSESSMENT — PAIN DESCRIPTION - ORIENTATION
ORIENTATION: LEFT
ORIENTATION: LEFT

## 2021-03-30 ASSESSMENT — PAIN DESCRIPTION - LOCATION
LOCATION: ARM
LOCATION: ARM

## 2021-03-30 ASSESSMENT — PAIN - FUNCTIONAL ASSESSMENT
PAIN_FUNCTIONAL_ASSESSMENT: PREVENTS OR INTERFERES SOME ACTIVE ACTIVITIES AND ADLS
PAIN_FUNCTIONAL_ASSESSMENT: PREVENTS OR INTERFERES SOME ACTIVE ACTIVITIES AND ADLS

## 2021-03-30 NOTE — PLAN OF CARE
Problem: Pain:  Goal: Pain level will decrease  Description: Pain level will decrease  3/30/2021 1215 by Jamey Jama RN  Outcome: Ongoing     Problem: Pain:  Goal: Control of acute pain  Description: Control of acute pain  3/30/2021 1215 by Jamey Jama RN  Outcome: Ongoing     Problem: Pain:  Goal: Control of chronic pain  Description: Control of chronic pain  3/30/2021 1215 by Jamey Jama RN  Outcome: Ongoing     Problem: Nutrition  Goal: Optimal nutrition therapy  3/30/2021 1215 by Jamey Jama RN  Outcome: Ongoing     Problem: Skin Integrity:  Goal: Will show no infection signs and symptoms  Description: Will show no infection signs and symptoms  3/30/2021 1215 by Jamey Jama RN  Outcome: Ongoing     Problem: Skin Integrity:  Goal: Absence of new skin breakdown  Description: Absence of new skin breakdown  3/30/2021 1215 by Jamey Jama RN  Outcome: Ongoing     Problem: Suicide risk  Goal: Provide patient with safe environment  Description: Provide patient with safe environment  3/30/2021 1215 by Jamey Jama RN  Outcome: Ongoing
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
## 2021-03-30 NOTE — PROGRESS NOTES
Occupational Therapy    Occupational Therapy Not Seen Note    DATE: 3/30/2021  Name: Iris Ray  : 1980  MRN: [id number]    Patient not available for Occupational Therapy due to: Other: Pt off of unit for a walk.     Next Scheduled Treatment: 3/31/21    Electronically signed by GOPI Vasquez on 3/30/2021 at 3:43 PM

## 2021-03-30 NOTE — PROGRESS NOTES
CHECKLIST    CAM-ICU RASS: GCS15  RESTRAINTS: None   IVF: None  NUTRITION: General diet  ANTIBIOTICS: Ancef   GI: Pepcid   DVT: Hep  GLYCEMIC CONTROL: HISS   HOB >45: Y  MOBILITY: PT/OT   IS: >2000    SUBJECTIVE    Rochester Hairston is s/p left forearm fasciotomy of ant compartment w/ L median and L superficial radial nerve with nerve conduit repairs, as well as brachial artery bypass with SVG, ulnar/radial artery thrombectomy with TPA infusion. Pt doing well overnight, no overnight events. Pt tolerating general diet, urinating independently without difficulty. Complains of 5/10 LUE pain, on MMT. Afebrile, Tmax 37.0      OBJECTIVE  VITALS: Temp: Temp: 98.4 °F (36.9 °C)Temp  Av.6 °F (37 °C)  Min: 98.3 °F (36.8 °C)  Max: 99 °F (40.5 °C) BP Systolic (69BGZ), HPS:594 , Min:126 , FJE:203   Diastolic (83XVG), IIJ:84, Min:67, Max:81   Pulse Pulse  Av.7  Min: 84  Max: 105 Resp Resp  Av.5  Min: 12  Max: 22 Pulse ox SpO2  Av.7 %  Min: 95 %  Max: 99 %    Physical Exam  Constitutional:      alert and cooperative  HENT:      Head: normocephalic, atraumatic    Eyes:      Pupils: equal round and reactive to light   Cardiovascular:      Rate and Rhythm: RRR     Pulses: (R) RP 2+, (L) RP present on doppler, L Ulnar signal on doppler   Pulmonary:      Effort: no resp distress     Breath sounds: CTA bilaterally  Abdominal:      General: non distended, normal BS     Palpations: soft, non tender   Skin:     General: Warm and dry. Left digits have sluggish cap refill.          LAB:  CBC:   Recent Labs     21  03421  0708 21  0511   WBC 13.2* 13.5* 16.2*   HGB 9.2* 8.6* 7.6*   HCT 27.4* 25.2* 24.1*   MCV 98.2 97.7 103.4*    211 237     BMP:   Recent Labs     21  03421  0708 21  0511   * 132* 133*   K 3.9 4.1 4.4    100 101   CO2 27 26 24   BUN 8 8 8   CREATININE 0.62* 0.54* 0.58*   GLUCOSE 180* 120* 117*         RADIOLOGY:  Xr Chest Portable    Result Date: 3/29/2021  No acute cardiopulmonary pathology. Jennifer Odom DO  3/30/2021  8:21 AM         Attending Note    Still monitoring HGB   I have reviewed the above TECSS note(s) and I either performed the key elements of the medical history and physical exam or was present with the resident when the key elements of the medical history and physical exam were performed. I have discussed the findings, established the care plan and recommendations with Resident, TECSS RN, bedside nurse.     Kanwal Landry MD  3/30/2021  11:09 PM

## 2021-03-30 NOTE — PROGRESS NOTES
Physical Therapy    DATE: 3/30/2021    NAME: Solis Delvalle  MRN: [id number]   : 1980      Patient not seen this date for Physical Therapy due to: Other: Pt ambulating hallway with Sitter and family.        Electronically signed by Darrin uL PTA on 3/30/2021 at 3:50 PM

## 2021-03-30 NOTE — CARE COORDINATION
Transitional Planning     Spoke with Dr. Jo Benavides; she stated that she would rather patient go to [facility] with wet to dry drg change rather than wound vac as it can be a hazard with tubing.

## 2021-03-31 LAB
ABSOLUTE EOS #: 0.14 K/UL (ref 0–0.44)
ABSOLUTE IMMATURE GRANULOCYTE: 0 K/UL (ref 0–0.3)
ABSOLUTE LYMPH #: 1.23 K/UL (ref 1.1–3.7)
ABSOLUTE MONO #: 1.64 K/UL (ref 0.1–1.2)
ANION GAP SERPL CALCULATED.3IONS-SCNC: 8 MMOL/L (ref 9–17)
BASOPHILS # BLD: 0 % (ref 0–2)
BASOPHILS ABSOLUTE: 0 K/UL (ref 0–0.2)
BUN BLDV-MCNC: 11 MG/DL (ref 6–20)
BUN/CREAT BLD: ABNORMAL (ref 9–20)
CALCIUM SERPL-MCNC: 8.3 MG/DL (ref 8.6–10.4)
CHLORIDE BLD-SCNC: 97 MMOL/L (ref 98–107)
CO2: 29 MMOL/L (ref 20–31)
CREAT SERPL-MCNC: 0.54 MG/DL (ref 0.7–1.2)
DIFFERENTIAL TYPE: ABNORMAL
EOSINOPHILS RELATIVE PERCENT: 1 % (ref 1–4)
GFR AFRICAN AMERICAN: >60 ML/MIN
GFR NON-AFRICAN AMERICAN: >60 ML/MIN
GFR SERPL CREATININE-BSD FRML MDRD: ABNORMAL ML/MIN/{1.73_M2}
GFR SERPL CREATININE-BSD FRML MDRD: ABNORMAL ML/MIN/{1.73_M2}
GLUCOSE BLD-MCNC: 111 MG/DL (ref 75–110)
GLUCOSE BLD-MCNC: 112 MG/DL (ref 70–99)
GLUCOSE BLD-MCNC: 115 MG/DL (ref 75–110)
GLUCOSE BLD-MCNC: 124 MG/DL (ref 75–110)
GLUCOSE BLD-MCNC: 126 MG/DL (ref 75–110)
GLUCOSE BLD-MCNC: 145 MG/DL (ref 75–110)
HCT VFR BLD CALC: 22.7 % (ref 40.7–50.3)
HEMOGLOBIN: 7.5 G/DL (ref 13–17)
IMMATURE GRANULOCYTES: 0 %
LYMPHOCYTES # BLD: 9 % (ref 24–43)
MCH RBC QN AUTO: 32.6 PG (ref 25.2–33.5)
MCHC RBC AUTO-ENTMCNC: 33 G/DL (ref 28.4–34.8)
MCV RBC AUTO: 98.7 FL (ref 82.6–102.9)
MONOCYTES # BLD: 12 % (ref 3–12)
MORPHOLOGY: NORMAL
NRBC AUTOMATED: 0 PER 100 WBC
PARTIAL THROMBOPLASTIN TIME: 48.1 SEC (ref 20.5–30.5)
PARTIAL THROMBOPLASTIN TIME: 48.6 SEC (ref 20.5–30.5)
PARTIAL THROMBOPLASTIN TIME: 51.3 SEC (ref 20.5–30.5)
PDW BLD-RTO: 12.3 % (ref 11.8–14.4)
PLATELET # BLD: 270 K/UL (ref 138–453)
PLATELET ESTIMATE: ABNORMAL
PMV BLD AUTO: 9.6 FL (ref 8.1–13.5)
POTASSIUM SERPL-SCNC: 4.4 MMOL/L (ref 3.7–5.3)
RBC # BLD: 2.3 M/UL (ref 4.21–5.77)
RBC # BLD: ABNORMAL 10*6/UL
SEG NEUTROPHILS: 78 % (ref 36–65)
SEGMENTED NEUTROPHILS ABSOLUTE COUNT: 10.69 K/UL (ref 1.5–8.1)
SODIUM BLD-SCNC: 134 MMOL/L (ref 135–144)
WBC # BLD: 13.7 K/UL (ref 3.5–11.3)
WBC # BLD: ABNORMAL 10*3/UL

## 2021-03-31 PROCEDURE — 85730 THROMBOPLASTIN TIME PARTIAL: CPT

## 2021-03-31 PROCEDURE — 97116 GAIT TRAINING THERAPY: CPT

## 2021-03-31 PROCEDURE — 82947 ASSAY GLUCOSE BLOOD QUANT: CPT

## 2021-03-31 PROCEDURE — 2060000000 HC ICU INTERMEDIATE R&B

## 2021-03-31 PROCEDURE — 6370000000 HC RX 637 (ALT 250 FOR IP): Performed by: STUDENT IN AN ORGANIZED HEALTH CARE EDUCATION/TRAINING PROGRAM

## 2021-03-31 PROCEDURE — 85025 COMPLETE CBC W/AUTO DIFF WBC: CPT

## 2021-03-31 PROCEDURE — 6370000000 HC RX 637 (ALT 250 FOR IP): Performed by: NURSE PRACTITIONER

## 2021-03-31 PROCEDURE — 6360000002 HC RX W HCPCS: Performed by: STUDENT IN AN ORGANIZED HEALTH CARE EDUCATION/TRAINING PROGRAM

## 2021-03-31 PROCEDURE — 2580000003 HC RX 258: Performed by: STUDENT IN AN ORGANIZED HEALTH CARE EDUCATION/TRAINING PROGRAM

## 2021-03-31 PROCEDURE — 36415 COLL VENOUS BLD VENIPUNCTURE: CPT

## 2021-03-31 PROCEDURE — 80048 BASIC METABOLIC PNL TOTAL CA: CPT

## 2021-03-31 PROCEDURE — 97535 SELF CARE MNGMENT TRAINING: CPT

## 2021-03-31 RX ORDER — HEPARIN SODIUM 10000 [USP'U]/100ML
5-32 INJECTION, SOLUTION INTRAVENOUS CONTINUOUS
Status: DISCONTINUED | OUTPATIENT
Start: 2021-03-31 | End: 2021-04-07

## 2021-03-31 RX ORDER — HEPARIN SODIUM 1000 [USP'U]/ML
4000 INJECTION, SOLUTION INTRAVENOUS; SUBCUTANEOUS PRN
Status: DISCONTINUED | OUTPATIENT
Start: 2021-03-31 | End: 2021-04-19

## 2021-03-31 RX ORDER — OXYCODONE HYDROCHLORIDE 5 MG/1
5 TABLET ORAL EVERY 4 HOURS PRN
Status: DISCONTINUED | OUTPATIENT
Start: 2021-03-31 | End: 2021-04-01

## 2021-03-31 RX ORDER — HEPARIN SODIUM 1000 [USP'U]/ML
4000 INJECTION, SOLUTION INTRAVENOUS; SUBCUTANEOUS ONCE
Status: DISCONTINUED | OUTPATIENT
Start: 2021-03-31 | End: 2021-04-01

## 2021-03-31 RX ORDER — HEPARIN SODIUM 1000 [USP'U]/ML
2000 INJECTION, SOLUTION INTRAVENOUS; SUBCUTANEOUS PRN
Status: DISCONTINUED | OUTPATIENT
Start: 2021-03-31 | End: 2021-04-19

## 2021-03-31 RX ADMIN — OXYCODONE HYDROCHLORIDE 10 MG: 5 TABLET ORAL at 02:43

## 2021-03-31 RX ADMIN — HEPARIN SODIUM 2000 UNITS: 1000 INJECTION INTRAVENOUS; SUBCUTANEOUS at 13:46

## 2021-03-31 RX ADMIN — METHOCARBAMOL TABLETS 750 MG: 750 TABLET, COATED ORAL at 00:22

## 2021-03-31 RX ADMIN — OXYCODONE HYDROCHLORIDE 10 MG: 5 TABLET ORAL at 06:31

## 2021-03-31 RX ADMIN — HEPARIN SODIUM AND DEXTROSE 28 UNITS/KG/HR: 10000; 5 INJECTION INTRAVENOUS at 06:57

## 2021-03-31 RX ADMIN — GABAPENTIN 300 MG: 300 CAPSULE ORAL at 20:10

## 2021-03-31 RX ADMIN — OXYCODONE HYDROCHLORIDE 5 MG: 5 TABLET ORAL at 15:42

## 2021-03-31 RX ADMIN — ACETAMINOPHEN 1000 MG: 500 TABLET ORAL at 06:31

## 2021-03-31 RX ADMIN — POLYETHYLENE GLYCOL 3350 17 G: 17 POWDER, FOR SOLUTION ORAL at 08:06

## 2021-03-31 RX ADMIN — OXYCODONE HYDROCHLORIDE 5 MG: 5 TABLET ORAL at 11:28

## 2021-03-31 RX ADMIN — SODIUM CHLORIDE, PRESERVATIVE FREE 10 ML: 5 INJECTION INTRAVENOUS at 20:10

## 2021-03-31 RX ADMIN — METHOCARBAMOL TABLETS 750 MG: 750 TABLET, COATED ORAL at 17:21

## 2021-03-31 RX ADMIN — HEPARIN SODIUM AND DEXTROSE 29.36 UNITS/KG/HR: 10000; 5 INJECTION INTRAVENOUS at 17:22

## 2021-03-31 RX ADMIN — DOCUSATE SODIUM 50MG AND SENNOSIDES 8.6MG 1 TABLET: 8.6; 5 TABLET, FILM COATED ORAL at 20:10

## 2021-03-31 RX ADMIN — ACETAMINOPHEN 1000 MG: 500 TABLET ORAL at 20:10

## 2021-03-31 RX ADMIN — METHOCARBAMOL TABLETS 750 MG: 750 TABLET, COATED ORAL at 11:28

## 2021-03-31 RX ADMIN — OXYCODONE HYDROCHLORIDE 5 MG: 5 TABLET ORAL at 20:10

## 2021-03-31 RX ADMIN — DOCUSATE SODIUM 50MG AND SENNOSIDES 8.6MG 1 TABLET: 8.6; 5 TABLET, FILM COATED ORAL at 08:06

## 2021-03-31 RX ADMIN — GABAPENTIN 300 MG: 300 CAPSULE ORAL at 06:30

## 2021-03-31 RX ADMIN — INSULIN LISPRO 3 UNITS: 100 INJECTION, SOLUTION INTRAVENOUS; SUBCUTANEOUS at 11:33

## 2021-03-31 RX ADMIN — SODIUM CHLORIDE, PRESERVATIVE FREE 10 ML: 5 INJECTION INTRAVENOUS at 08:06

## 2021-03-31 RX ADMIN — ACETAMINOPHEN 1000 MG: 500 TABLET ORAL at 13:50

## 2021-03-31 RX ADMIN — METHOCARBAMOL TABLETS 750 MG: 750 TABLET, COATED ORAL at 06:31

## 2021-03-31 RX ADMIN — GABAPENTIN 300 MG: 300 CAPSULE ORAL at 13:50

## 2021-03-31 RX ADMIN — HEPARIN SODIUM 2000 UNITS: 1000 INJECTION INTRAVENOUS; SUBCUTANEOUS at 06:57

## 2021-03-31 RX ADMIN — HEPARIN SODIUM AND DEXTROSE 26 UNITS/KG/HR: 10000; 5 INJECTION INTRAVENOUS at 04:00

## 2021-03-31 ASSESSMENT — PAIN SCALES - GENERAL
PAINLEVEL_OUTOF10: 8
PAINLEVEL_OUTOF10: 6
PAINLEVEL_OUTOF10: 0
PAINLEVEL_OUTOF10: 5
PAINLEVEL_OUTOF10: 7
PAINLEVEL_OUTOF10: 0
PAINLEVEL_OUTOF10: 6
PAINLEVEL_OUTOF10: 0
PAINLEVEL_OUTOF10: 6

## 2021-03-31 ASSESSMENT — PAIN DESCRIPTION - DESCRIPTORS
DESCRIPTORS: ACHING

## 2021-03-31 ASSESSMENT — PAIN - FUNCTIONAL ASSESSMENT: PAIN_FUNCTIONAL_ASSESSMENT: PREVENTS OR INTERFERES SOME ACTIVE ACTIVITIES AND ADLS

## 2021-03-31 ASSESSMENT — PAIN DESCRIPTION - ORIENTATION: ORIENTATION: LEFT

## 2021-03-31 ASSESSMENT — PAIN DESCRIPTION - FREQUENCY
FREQUENCY: CONTINUOUS

## 2021-03-31 ASSESSMENT — PAIN DESCRIPTION - LOCATION
LOCATION: ARM
LOCATION: ARM

## 2021-03-31 ASSESSMENT — PAIN DESCRIPTION - PAIN TYPE: TYPE: SURGICAL PAIN

## 2021-03-31 NOTE — CONSULTS
Plastic Surgery Progress Note  JONATHAN Fischer MD, FACS                Patient - Iris Ray  MRN - [id number]  Acct # - [unfilled]   - 1980  DATE OF ADMISSION - 3/26/2021  7:08 AM  DATE OF EVALUATION - 3/31/2021  [id number]  Hospital Day - 5  Primary Care Physician - ALLISON Murguia - CNP        PATIENT NAME: Iris Ray     TODAY'S DATE: 3/31/2021, 12:30 PM    SUBJECTIVE:    Pt  is being seen today for consultation for eventual closure of a LUE fasciotomy site. Pt s/p repair of radial and ulnar arteries on 3/26/2021, s/p left forearm exploration and brachial artery bypass with radial/ulnar artery thrombectomy on 3/27/21, and left forearm fasciotomy with excision and debridement of nonviable muscle, repair of left median and superficial radial nerves with nerve conduit with orthopedic surgery on 3/28/2021. Patient currently with radial and ulnar signals on Doppler. Patient covering in an expected manner, with the exception of continued to severe edematous left forearm fasciotomy site. OBJECTIVE:     VITALS:  /76   Pulse 100   Temp 99 °F (37.2 °C) (Oral)   Resp 22   Ht 5' 8\" (1.727 m)   Wt 155 lb 1.6 oz (70.4 kg)   SpO2 98%   BMI 23.58 kg/m²        PHYSICAL EXAM:    Constitutional:      alert and cooperative  HENT:      Head: normocephalic, atraumatic    Eyes:      Pupils: equal round and reactive to light   Cardiovascular:      Rate and Rhythm: RRR     Pulses: (R) RP 2+, (L) RP present on doppler, L Ulnar signal on doppler   Pulmonary:      Effort: no resp distress     Breath sounds: CTA bilaterally  Abdominal:      General: non distended, normal BS     Palpations: soft, non tender   Skin:     General: Warm and dry.  Left digits have sluggish cap refill.                    Data:  CBC with Differential:    Lab Results   Component Value Date    WBC 13.7 2021    RBC 2.30 2021    HGB 7.5 2021    HCT 22.7 2021     2021    MCV 98.7 03/31/2021    MCH 32.6 03/31/2021    MCHC 33.0 03/31/2021    RDW 12.3 03/31/2021    LYMPHOPCT 9 03/31/2021    MONOPCT 12 03/31/2021    BASOPCT 0 03/31/2021    MONOSABS 1.64 03/31/2021    LYMPHSABS 1.23 03/31/2021    EOSABS 0.14 03/31/2021    BASOSABS 0.00 03/31/2021    DIFFTYPE NOT REPORTED 03/31/2021     CMP:    Lab Results   Component Value Date     03/31/2021    K 4.4 03/31/2021    CL 97 03/31/2021    CO2 29 03/31/2021    BUN 11 03/31/2021    CREATININE 0.54 03/31/2021    GFRAA >60 03/31/2021    LABGLOM >60 03/31/2021    GLUCOSE 112 03/31/2021    CALCIUM 8.3 03/31/2021     BMP:    Lab Results   Component Value Date     03/31/2021    K 4.4 03/31/2021    CL 97 03/31/2021    CO2 29 03/31/2021    BUN 11 03/31/2021    CREATININE 0.54 03/31/2021    CALCIUM 8.3 03/31/2021    GFRAA >60 03/31/2021    LABGLOM >60 03/31/2021    GLUCOSE 112 03/31/2021       Radiology Review:    No results found. ASSESSMENT   1. 40M s/p Left upper extremity fasciotomy and repair of radial and ulnar arteries, superficial radial and median nerves    Plan  · Recommend either a wound VAC if the vasculature is deep to the repaired musculature, or continue wet-to-dry dressings with the plan for possible eventual coverage/closure next week sometime. · Plastics to follow and further recommendations to come.       Evert Brittle MD FACS
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
## 2021-03-31 NOTE — PLAN OF CARE
Problem: Suicide risk  Goal: Provide patient with safe environment  Description: Provide patient with safe environment  3/31/2021 0444 by Portia Mack RN  Outcome: Ongoing     Problem: Skin Integrity:  Goal: Will show no infection signs and symptoms  Description: Will show no infection signs and symptoms  3/31/2021 0444 by Portia Mack RN  Outcome: Ongoing     Problem: Pain:  Goal: Control of acute pain  Description: Control of acute pain  3/31/2021 0444 by Portia Mack RN  Outcome: Ongoing

## 2021-03-31 NOTE — PLAN OF CARE
Problem: Pain:  Goal: Pain level will decrease  Description: Pain level will decrease  3/31/2021 0814 by Monserrat Shi RN  Outcome: Ongoing     Problem: Pain:  Goal: Control of acute pain  Description: Control of acute pain  3/31/2021 0814 by Monserrat Shi RN  Outcome: Ongoing     Problem: Pain:  Goal: Control of chronic pain  Description: Control of chronic pain  3/31/2021 0814 by Monserrat Shi RN  Outcome: Ongoing     Problem: Nutrition  Goal: Optimal nutrition therapy  3/31/2021 0814 by Monserrat Shi RN  Outcome: Ongoing     Problem: Skin Integrity:  Goal: Will show no infection signs and symptoms  Description: Will show no infection signs and symptoms  3/31/2021 0814 by Monserrat Shi RN  Outcome: Ongoing     Problem: Skin Integrity:  Goal: Absence of new skin breakdown  Description: Absence of new skin breakdown  3/31/2021 0814 by Monserrat Shi RN  Outcome: Ongoing     Problem: Suicide risk  Goal: Provide patient with safe environment  Description: Provide patient with safe environment  3/31/2021 0814 by Monserrat Shi RN  Outcome: Ongoing

## 2021-04-01 LAB
ABSOLUTE EOS #: 0.23 K/UL (ref 0–0.44)
ABSOLUTE IMMATURE GRANULOCYTE: 0 K/UL (ref 0–0.3)
ABSOLUTE LYMPH #: 1.14 K/UL (ref 1.1–3.7)
ABSOLUTE MONO #: 1.48 K/UL (ref 0.1–1.2)
ANION GAP SERPL CALCULATED.3IONS-SCNC: 11 MMOL/L (ref 9–17)
BASOPHILS # BLD: 0 % (ref 0–2)
BASOPHILS ABSOLUTE: 0 K/UL (ref 0–0.2)
BUN BLDV-MCNC: 13 MG/DL (ref 6–20)
BUN/CREAT BLD: ABNORMAL (ref 9–20)
CALCIUM SERPL-MCNC: 8 MG/DL (ref 8.6–10.4)
CHLORIDE BLD-SCNC: 99 MMOL/L (ref 98–107)
CO2: 25 MMOL/L (ref 20–31)
CREAT SERPL-MCNC: 0.53 MG/DL (ref 0.7–1.2)
DIFFERENTIAL TYPE: ABNORMAL
EOSINOPHILS RELATIVE PERCENT: 2 % (ref 1–4)
GFR AFRICAN AMERICAN: >60 ML/MIN
GFR NON-AFRICAN AMERICAN: >60 ML/MIN
GFR SERPL CREATININE-BSD FRML MDRD: ABNORMAL ML/MIN/{1.73_M2}
GFR SERPL CREATININE-BSD FRML MDRD: ABNORMAL ML/MIN/{1.73_M2}
GLUCOSE BLD-MCNC: 109 MG/DL (ref 75–110)
GLUCOSE BLD-MCNC: 115 MG/DL (ref 70–99)
GLUCOSE BLD-MCNC: 117 MG/DL (ref 75–110)
HCT VFR BLD CALC: 23.1 % (ref 40.7–50.3)
HEMOGLOBIN: 7.5 G/DL (ref 13–17)
IMMATURE GRANULOCYTES: 0 %
LYMPHOCYTES # BLD: 10 % (ref 24–43)
MCH RBC QN AUTO: 31.9 PG (ref 25.2–33.5)
MCHC RBC AUTO-ENTMCNC: 32.5 G/DL (ref 28.4–34.8)
MCV RBC AUTO: 98.3 FL (ref 82.6–102.9)
MONOCYTES # BLD: 13 % (ref 3–12)
MORPHOLOGY: NORMAL
NRBC AUTOMATED: 0 PER 100 WBC
PARTIAL THROMBOPLASTIN TIME: 59.1 SEC (ref 20.5–30.5)
PDW BLD-RTO: 12.3 % (ref 11.8–14.4)
PLATELET # BLD: 335 K/UL (ref 138–453)
PLATELET ESTIMATE: ABNORMAL
PMV BLD AUTO: 9.6 FL (ref 8.1–13.5)
POTASSIUM SERPL-SCNC: 4.2 MMOL/L (ref 3.7–5.3)
RBC # BLD: 2.35 M/UL (ref 4.21–5.77)
RBC # BLD: ABNORMAL 10*6/UL
SEG NEUTROPHILS: 75 % (ref 36–65)
SEGMENTED NEUTROPHILS ABSOLUTE COUNT: 8.55 K/UL (ref 1.5–8.1)
SODIUM BLD-SCNC: 135 MMOL/L (ref 135–144)
WBC # BLD: 11.4 K/UL (ref 3.5–11.3)
WBC # BLD: ABNORMAL 10*3/UL

## 2021-04-01 PROCEDURE — 97535 SELF CARE MNGMENT TRAINING: CPT

## 2021-04-01 PROCEDURE — 6370000000 HC RX 637 (ALT 250 FOR IP): Performed by: STUDENT IN AN ORGANIZED HEALTH CARE EDUCATION/TRAINING PROGRAM

## 2021-04-01 PROCEDURE — 80048 BASIC METABOLIC PNL TOTAL CA: CPT

## 2021-04-01 PROCEDURE — 6370000000 HC RX 637 (ALT 250 FOR IP): Performed by: EMERGENCY MEDICINE

## 2021-04-01 PROCEDURE — 36415 COLL VENOUS BLD VENIPUNCTURE: CPT

## 2021-04-01 PROCEDURE — 1200000000 HC SEMI PRIVATE

## 2021-04-01 PROCEDURE — 2580000003 HC RX 258: Performed by: STUDENT IN AN ORGANIZED HEALTH CARE EDUCATION/TRAINING PROGRAM

## 2021-04-01 PROCEDURE — 82947 ASSAY GLUCOSE BLOOD QUANT: CPT

## 2021-04-01 PROCEDURE — 6360000002 HC RX W HCPCS: Performed by: STUDENT IN AN ORGANIZED HEALTH CARE EDUCATION/TRAINING PROGRAM

## 2021-04-01 PROCEDURE — 97530 THERAPEUTIC ACTIVITIES: CPT

## 2021-04-01 PROCEDURE — 85730 THROMBOPLASTIN TIME PARTIAL: CPT

## 2021-04-01 PROCEDURE — 6370000000 HC RX 637 (ALT 250 FOR IP): Performed by: NURSE PRACTITIONER

## 2021-04-01 PROCEDURE — 85025 COMPLETE CBC W/AUTO DIFF WBC: CPT

## 2021-04-01 RX ORDER — HYDROXYCHLOROQUINE SULFATE 200 MG/1
200 TABLET, FILM COATED ORAL DAILY
Status: DISCONTINUED | OUTPATIENT
Start: 2021-04-01 | End: 2021-04-22 | Stop reason: HOSPADM

## 2021-04-01 RX ORDER — GABAPENTIN 300 MG/1
600 CAPSULE ORAL EVERY 8 HOURS SCHEDULED
Status: DISCONTINUED | OUTPATIENT
Start: 2021-04-01 | End: 2021-04-11

## 2021-04-01 RX ORDER — OXYCODONE HYDROCHLORIDE 5 MG/1
5 TABLET ORAL EVERY 6 HOURS PRN
Status: DISCONTINUED | OUTPATIENT
Start: 2021-04-01 | End: 2021-04-04

## 2021-04-01 RX ORDER — HYDROXYCHLOROQUINE SULFATE 200 MG/1
200 TABLET, FILM COATED ORAL DAILY
Status: ON HOLD | COMMUNITY
End: 2021-04-21 | Stop reason: HOSPADM

## 2021-04-01 RX ORDER — POLYETHYLENE GLYCOL 3350 17 G/17G
17 POWDER, FOR SOLUTION ORAL 2 TIMES DAILY
Status: DISCONTINUED | OUTPATIENT
Start: 2021-04-01 | End: 2021-04-20

## 2021-04-01 RX ADMIN — GABAPENTIN 300 MG: 300 CAPSULE ORAL at 05:32

## 2021-04-01 RX ADMIN — METHOCARBAMOL TABLETS 750 MG: 750 TABLET, COATED ORAL at 23:54

## 2021-04-01 RX ADMIN — GABAPENTIN 600 MG: 300 CAPSULE ORAL at 13:54

## 2021-04-01 RX ADMIN — ACETAMINOPHEN 1000 MG: 500 TABLET ORAL at 20:16

## 2021-04-01 RX ADMIN — POLYETHYLENE GLYCOL 3350 17 G: 17 POWDER, FOR SOLUTION ORAL at 20:16

## 2021-04-01 RX ADMIN — METHOCARBAMOL TABLETS 750 MG: 750 TABLET, COATED ORAL at 00:09

## 2021-04-01 RX ADMIN — DOCUSATE SODIUM 50MG AND SENNOSIDES 8.6MG 1 TABLET: 8.6; 5 TABLET, FILM COATED ORAL at 08:16

## 2021-04-01 RX ADMIN — OXYCODONE HYDROCHLORIDE 5 MG: 5 TABLET ORAL at 12:16

## 2021-04-01 RX ADMIN — METHOCARBAMOL TABLETS 750 MG: 750 TABLET, COATED ORAL at 18:10

## 2021-04-01 RX ADMIN — METHOCARBAMOL TABLETS 750 MG: 750 TABLET, COATED ORAL at 12:16

## 2021-04-01 RX ADMIN — ACETAMINOPHEN 1000 MG: 500 TABLET ORAL at 05:32

## 2021-04-01 RX ADMIN — SODIUM CHLORIDE, PRESERVATIVE FREE 10 ML: 5 INJECTION INTRAVENOUS at 20:16

## 2021-04-01 RX ADMIN — OXYCODONE HYDROCHLORIDE 5 MG: 5 TABLET ORAL at 00:09

## 2021-04-01 RX ADMIN — OXYCODONE HYDROCHLORIDE 5 MG: 5 TABLET ORAL at 08:16

## 2021-04-01 RX ADMIN — HEPARIN SODIUM AND DEXTROSE 29.36 UNITS/KG/HR: 10000; 5 INJECTION INTRAVENOUS at 16:21

## 2021-04-01 RX ADMIN — OXYCODONE HYDROCHLORIDE 5 MG: 5 TABLET ORAL at 23:54

## 2021-04-01 RX ADMIN — HYDROXYCHLOROQUINE SULFATE 200 MG: 200 TABLET, FILM COATED ORAL at 09:32

## 2021-04-01 RX ADMIN — OXYCODONE HYDROCHLORIDE 5 MG: 5 TABLET ORAL at 04:07

## 2021-04-01 RX ADMIN — GABAPENTIN 600 MG: 300 CAPSULE ORAL at 20:15

## 2021-04-01 RX ADMIN — OXYCODONE HYDROCHLORIDE 5 MG: 5 TABLET ORAL at 18:10

## 2021-04-01 RX ADMIN — METHOCARBAMOL TABLETS 750 MG: 750 TABLET, COATED ORAL at 05:32

## 2021-04-01 RX ADMIN — POLYETHYLENE GLYCOL 3350 17 G: 17 POWDER, FOR SOLUTION ORAL at 08:16

## 2021-04-01 RX ADMIN — DOCUSATE SODIUM 50MG AND SENNOSIDES 8.6MG 1 TABLET: 8.6; 5 TABLET, FILM COATED ORAL at 20:16

## 2021-04-01 RX ADMIN — HEPARIN SODIUM AND DEXTROSE 29.36 UNITS/KG/HR: 10000; 5 INJECTION INTRAVENOUS at 04:09

## 2021-04-01 RX ADMIN — ACETAMINOPHEN 1000 MG: 500 TABLET ORAL at 13:54

## 2021-04-01 ASSESSMENT — PAIN SCALES - GENERAL
PAINLEVEL_OUTOF10: 7
PAINLEVEL_OUTOF10: 8
PAINLEVEL_OUTOF10: 8
PAINLEVEL_OUTOF10: 7
PAINLEVEL_OUTOF10: 6
PAINLEVEL_OUTOF10: 8
PAINLEVEL_OUTOF10: 6

## 2021-04-01 ASSESSMENT — PAIN DESCRIPTION - ORIENTATION
ORIENTATION: LEFT

## 2021-04-01 ASSESSMENT — PAIN DESCRIPTION - PAIN TYPE
TYPE: SURGICAL PAIN
TYPE: SURGICAL PAIN

## 2021-04-01 ASSESSMENT — PAIN DESCRIPTION - DESCRIPTORS: DESCRIPTORS: ACHING;THROBBING

## 2021-04-01 ASSESSMENT — PAIN DESCRIPTION - ONSET: ONSET: ON-GOING
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
## 2021-04-01 NOTE — PROGRESS NOTES
Nutrition Education/Counseling:  No recommendation at this time   Coordination of Nutrition Care:  Continue to monitor while inpatient    Goals:  Oral intakes to meet % of estimated nutrition needs. Nutrition Monitoring and Evaluation:   Behavioral-Environmental Outcomes:  None Identified   Food/Nutrient Intake Outcomes:  Food and Nutrient Intake, Supplement Intake  Physical Signs/Symptoms Outcomes:  Biochemical Data, Nutrition Focused Physical Findings, Skin, Weight, Fluid Status or Edema     Discharge Planning:     Too soon to determine     Electronically signed by Annie Ball MS, RD, LD on 4/1/21 at 2:30 PM EDT    Contact: [phone]

## 2021-04-01 NOTE — CARE COORDINATION
Transitional Planning     CM spoke with Shirlene vogt RN at [facility] regarding if they accept patients with woundvacs; she stated that it is case by case. Patients with hazards (woundvac) will typically have a sitter.

## 2021-04-01 NOTE — PLAN OF CARE
Problem: Nutrition  Goal: Optimal nutrition therapy  Outcome: Met This Shift     Problem: Skin Integrity:  Goal: Absence of new skin breakdown  Description: Absence of new skin breakdown  Outcome: Met This Shift     Problem: Suicide risk  Goal: Provide patient with safe environment  Description: Provide patient with safe environment  Outcome: Met This Shift     Problem: Pain:  Goal: Pain level will decrease  Description: Pain level will decrease  Outcome: Ongoing     Problem: Pain:  Goal: Control of acute pain  Description: Control of acute pain  Outcome: Ongoing
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
## 2021-04-01 NOTE — PLAN OF CARE
Problem: Pain:  Goal: Pain level will decrease  Description: Pain level will decrease  Outcome: Ongoing     Problem: Nutrition  Goal: Optimal nutrition therapy  Outcome: Ongoing     Problem: Skin Integrity:  Goal: Will show no infection signs and symptoms  Description: Will show no infection signs and symptoms  Outcome: Ongoing     Problem: Skin Integrity:  Goal: Absence of new skin breakdown  Description: Absence of new skin breakdown  Outcome: Ongoing     Problem: Suicide risk  Goal: Provide patient with safe environment  Description: Provide patient with safe environment  Outcome: Ongoing

## 2021-04-01 NOTE — CARE COORDINATION
Transitional Planning     Spoke with Cristina trauma care coordinator regarding discharge planning. Trauma team will need to speak with psychiatrist if patient will be discharging with woundvac to determine if [facility] is able to accept patient. Pending plastics recs for arm wound.
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
## 2021-04-02 LAB
ABSOLUTE EOS #: 0.23 K/UL (ref 0–0.44)
ABSOLUTE IMMATURE GRANULOCYTE: 0.07 K/UL (ref 0–0.3)
ABSOLUTE LYMPH #: 1.35 K/UL (ref 1.1–3.7)
ABSOLUTE MONO #: 1.44 K/UL (ref 0.1–1.2)
ANION GAP SERPL CALCULATED.3IONS-SCNC: 10 MMOL/L (ref 9–17)
BASOPHILS # BLD: 0 % (ref 0–2)
BASOPHILS ABSOLUTE: 0.03 K/UL (ref 0–0.2)
BUN BLDV-MCNC: 10 MG/DL (ref 6–20)
BUN/CREAT BLD: ABNORMAL (ref 9–20)
CALCIUM SERPL-MCNC: 8.6 MG/DL (ref 8.6–10.4)
CHLORIDE BLD-SCNC: 99 MMOL/L (ref 98–107)
CO2: 25 MMOL/L (ref 20–31)
CREAT SERPL-MCNC: 0.51 MG/DL (ref 0.7–1.2)
DIFFERENTIAL TYPE: ABNORMAL
EOSINOPHILS RELATIVE PERCENT: 2 % (ref 1–4)
GFR AFRICAN AMERICAN: >60 ML/MIN
GFR NON-AFRICAN AMERICAN: >60 ML/MIN
GFR SERPL CREATININE-BSD FRML MDRD: ABNORMAL ML/MIN/{1.73_M2}
GFR SERPL CREATININE-BSD FRML MDRD: ABNORMAL ML/MIN/{1.73_M2}
GLUCOSE BLD-MCNC: 107 MG/DL (ref 70–99)
HCT VFR BLD CALC: 22.7 % (ref 40.7–50.3)
HEMOGLOBIN: 7.2 G/DL (ref 13–17)
IMMATURE GRANULOCYTES: 1 %
LYMPHOCYTES # BLD: 12 % (ref 24–43)
MCH RBC QN AUTO: 32.3 PG (ref 25.2–33.5)
MCHC RBC AUTO-ENTMCNC: 31.7 G/DL (ref 28.4–34.8)
MCV RBC AUTO: 101.8 FL (ref 82.6–102.9)
MONOCYTES # BLD: 13 % (ref 3–12)
NRBC AUTOMATED: 0 PER 100 WBC
PARTIAL THROMBOPLASTIN TIME: 43.4 SEC (ref 20.5–30.5)
PARTIAL THROMBOPLASTIN TIME: 44.8 SEC (ref 20.5–30.5)
PARTIAL THROMBOPLASTIN TIME: 45.7 SEC (ref 20.5–30.5)
PDW BLD-RTO: 12.5 % (ref 11.8–14.4)
PLATELET # BLD: 365 K/UL (ref 138–453)
PLATELET ESTIMATE: ABNORMAL
PMV BLD AUTO: 9.2 FL (ref 8.1–13.5)
POTASSIUM SERPL-SCNC: 4.4 MMOL/L (ref 3.7–5.3)
RBC # BLD: 2.23 M/UL (ref 4.21–5.77)
RBC # BLD: ABNORMAL 10*6/UL
SEG NEUTROPHILS: 72 % (ref 36–65)
SEGMENTED NEUTROPHILS ABSOLUTE COUNT: 8.15 K/UL (ref 1.5–8.1)
SODIUM BLD-SCNC: 134 MMOL/L (ref 135–144)
WBC # BLD: 11.3 K/UL (ref 3.5–11.3)
WBC # BLD: ABNORMAL 10*3/UL

## 2021-04-02 PROCEDURE — 6360000002 HC RX W HCPCS: Performed by: STUDENT IN AN ORGANIZED HEALTH CARE EDUCATION/TRAINING PROGRAM

## 2021-04-02 PROCEDURE — 80048 BASIC METABOLIC PNL TOTAL CA: CPT

## 2021-04-02 PROCEDURE — 97530 THERAPEUTIC ACTIVITIES: CPT

## 2021-04-02 PROCEDURE — 6370000000 HC RX 637 (ALT 250 FOR IP): Performed by: STUDENT IN AN ORGANIZED HEALTH CARE EDUCATION/TRAINING PROGRAM

## 2021-04-02 PROCEDURE — 36415 COLL VENOUS BLD VENIPUNCTURE: CPT

## 2021-04-02 PROCEDURE — 2580000003 HC RX 258: Performed by: STUDENT IN AN ORGANIZED HEALTH CARE EDUCATION/TRAINING PROGRAM

## 2021-04-02 PROCEDURE — 1200000000 HC SEMI PRIVATE

## 2021-04-02 PROCEDURE — 6370000000 HC RX 637 (ALT 250 FOR IP): Performed by: EMERGENCY MEDICINE

## 2021-04-02 PROCEDURE — 97535 SELF CARE MNGMENT TRAINING: CPT

## 2021-04-02 PROCEDURE — 6370000000 HC RX 637 (ALT 250 FOR IP): Performed by: NURSE PRACTITIONER

## 2021-04-02 PROCEDURE — 85025 COMPLETE CBC W/AUTO DIFF WBC: CPT

## 2021-04-02 PROCEDURE — 85730 THROMBOPLASTIN TIME PARTIAL: CPT

## 2021-04-02 RX ADMIN — METHOCARBAMOL TABLETS 750 MG: 750 TABLET, COATED ORAL at 18:45

## 2021-04-02 RX ADMIN — SODIUM CHLORIDE, PRESERVATIVE FREE 10 ML: 5 INJECTION INTRAVENOUS at 08:40

## 2021-04-02 RX ADMIN — ACETAMINOPHEN 1000 MG: 500 TABLET ORAL at 21:53

## 2021-04-02 RX ADMIN — DOCUSATE SODIUM 50MG AND SENNOSIDES 8.6MG 1 TABLET: 8.6; 5 TABLET, FILM COATED ORAL at 08:40

## 2021-04-02 RX ADMIN — POLYETHYLENE GLYCOL 3350 17 G: 17 POWDER, FOR SOLUTION ORAL at 08:45

## 2021-04-02 RX ADMIN — GABAPENTIN 600 MG: 300 CAPSULE ORAL at 06:01

## 2021-04-02 RX ADMIN — OXYCODONE HYDROCHLORIDE 5 MG: 5 TABLET ORAL at 06:01

## 2021-04-02 RX ADMIN — HEPARIN SODIUM AND DEXTROSE 31.36 UNITS/KG/HR: 10000; 5 INJECTION INTRAVENOUS at 14:16

## 2021-04-02 RX ADMIN — POLYETHYLENE GLYCOL 3350 17 G: 17 POWDER, FOR SOLUTION ORAL at 21:54

## 2021-04-02 RX ADMIN — HEPARIN SODIUM AND DEXTROSE 29.36 UNITS/KG/HR: 10000; 5 INJECTION INTRAVENOUS at 02:06

## 2021-04-02 RX ADMIN — OXYCODONE HYDROCHLORIDE 5 MG: 5 TABLET ORAL at 18:45

## 2021-04-02 RX ADMIN — DOCUSATE SODIUM 50MG AND SENNOSIDES 8.6MG 1 TABLET: 8.6; 5 TABLET, FILM COATED ORAL at 21:53

## 2021-04-02 RX ADMIN — ACETAMINOPHEN 1000 MG: 500 TABLET ORAL at 14:15

## 2021-04-02 RX ADMIN — HYDROXYCHLOROQUINE SULFATE 200 MG: 200 TABLET, FILM COATED ORAL at 08:40

## 2021-04-02 RX ADMIN — ACETAMINOPHEN 1000 MG: 500 TABLET ORAL at 06:01

## 2021-04-02 RX ADMIN — OXYCODONE HYDROCHLORIDE 5 MG: 5 TABLET ORAL at 12:05

## 2021-04-02 RX ADMIN — GABAPENTIN 600 MG: 300 CAPSULE ORAL at 21:53

## 2021-04-02 RX ADMIN — HEPARIN SODIUM 2000 UNITS: 1000 INJECTION INTRAVENOUS; SUBCUTANEOUS at 12:36

## 2021-04-02 RX ADMIN — HEPARIN SODIUM 2000 UNITS: 1000 INJECTION INTRAVENOUS; SUBCUTANEOUS at 21:55

## 2021-04-02 RX ADMIN — METHOCARBAMOL TABLETS 750 MG: 750 TABLET, COATED ORAL at 06:01

## 2021-04-02 RX ADMIN — METHOCARBAMOL TABLETS 750 MG: 750 TABLET, COATED ORAL at 12:06

## 2021-04-02 RX ADMIN — GABAPENTIN 600 MG: 300 CAPSULE ORAL at 14:15

## 2021-04-02 ASSESSMENT — PAIN SCALES - GENERAL
PAINLEVEL_OUTOF10: 8
PAINLEVEL_OUTOF10: 4
PAINLEVEL_OUTOF10: 7

## 2021-04-02 ASSESSMENT — PAIN DESCRIPTION - LOCATION: LOCATION: ARM;HAND

## 2021-04-02 ASSESSMENT — PAIN DESCRIPTION - ONSET: ONSET: ON-GOING

## 2021-04-02 ASSESSMENT — PAIN DESCRIPTION - ORIENTATION: ORIENTATION: LEFT

## 2021-04-02 ASSESSMENT — PAIN DESCRIPTION - DESCRIPTORS: DESCRIPTORS: BURNING

## 2021-04-02 ASSESSMENT — PAIN DESCRIPTION - FREQUENCY: FREQUENCY: CONTINUOUS

## 2021-04-02 NOTE — PROGRESS NOTES
Pt morning aptt was subtherapeutic at 45.7. Pt heparin gtt currently running at a rate of 29.36units/kg/hr. Order states to give half re-bolus and increase infusion by 2 based on aptt result. However, order has max rate of 30units/kg/hr. Trauma resident notified about issue and told writer to keep heparin gtt at current rate until trauma team can address this morning. Will continue with plan of care.  Electronically signed by Radha Mcdonald RN on 4/2/2021 at 5:53 AM

## 2021-04-02 NOTE — PLAN OF CARE
Problem: Pain:  Goal: Pain level will decrease  Description: Pain level will decrease  4/1/2021 2347 by Megha Celis RN  Outcome: Ongoing     Problem: Nutrition  Goal: Optimal nutrition therapy  4/1/2021 2347 by Megha Celis RN  Outcome: Ongoing     Problem: Skin Integrity:  Goal: Will show no infection signs and symptoms  Description: Will show no infection signs and symptoms  Outcome: Ongoing     Problem: Skin Integrity:  Goal: Absence of new skin breakdown  Description: Absence of new skin breakdown  4/1/2021 2347 by Megha Celis RN  Outcome: Ongoing     Problem: Suicide risk  Goal: Provide patient with safe environment  Description: Provide patient with safe environment  4/1/2021 2347 by Megha Celis RN  Outcome: Ongoing
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
## 2021-04-03 LAB
PARTIAL THROMBOPLASTIN TIME: 30.6 SEC (ref 20.5–30.5)
PARTIAL THROMBOPLASTIN TIME: 42 SEC (ref 20.5–30.5)
PARTIAL THROMBOPLASTIN TIME: 44.9 SEC (ref 20.5–30.5)
PARTIAL THROMBOPLASTIN TIME: 48 SEC (ref 20.5–30.5)

## 2021-04-03 PROCEDURE — 6370000000 HC RX 637 (ALT 250 FOR IP): Performed by: STUDENT IN AN ORGANIZED HEALTH CARE EDUCATION/TRAINING PROGRAM

## 2021-04-03 PROCEDURE — 6370000000 HC RX 637 (ALT 250 FOR IP): Performed by: EMERGENCY MEDICINE

## 2021-04-03 PROCEDURE — 1200000000 HC SEMI PRIVATE

## 2021-04-03 PROCEDURE — 85730 THROMBOPLASTIN TIME PARTIAL: CPT

## 2021-04-03 PROCEDURE — 6370000000 HC RX 637 (ALT 250 FOR IP): Performed by: NURSE PRACTITIONER

## 2021-04-03 PROCEDURE — 6360000002 HC RX W HCPCS: Performed by: STUDENT IN AN ORGANIZED HEALTH CARE EDUCATION/TRAINING PROGRAM

## 2021-04-03 PROCEDURE — 36415 COLL VENOUS BLD VENIPUNCTURE: CPT

## 2021-04-03 PROCEDURE — 2580000003 HC RX 258: Performed by: STUDENT IN AN ORGANIZED HEALTH CARE EDUCATION/TRAINING PROGRAM

## 2021-04-03 RX ADMIN — METHOCARBAMOL TABLETS 750 MG: 750 TABLET, COATED ORAL at 23:04

## 2021-04-03 RX ADMIN — HEPARIN SODIUM 2000 UNITS: 1000 INJECTION INTRAVENOUS; SUBCUTANEOUS at 05:53

## 2021-04-03 RX ADMIN — HYDROXYCHLOROQUINE SULFATE 200 MG: 200 TABLET, FILM COATED ORAL at 09:13

## 2021-04-03 RX ADMIN — HEPARIN SODIUM AND DEXTROSE 35.36 UNITS/KG/HR: 10000; 5 INJECTION INTRAVENOUS at 05:53

## 2021-04-03 RX ADMIN — GABAPENTIN 600 MG: 300 CAPSULE ORAL at 05:54

## 2021-04-03 RX ADMIN — ACETAMINOPHEN 1000 MG: 500 TABLET ORAL at 13:43

## 2021-04-03 RX ADMIN — ACETAMINOPHEN 1000 MG: 500 TABLET ORAL at 20:35

## 2021-04-03 RX ADMIN — HEPARIN SODIUM 2000 UNITS: 1000 INJECTION INTRAVENOUS; SUBCUTANEOUS at 23:05

## 2021-04-03 RX ADMIN — GABAPENTIN 600 MG: 300 CAPSULE ORAL at 13:42

## 2021-04-03 RX ADMIN — POLYETHYLENE GLYCOL 3350 17 G: 17 POWDER, FOR SOLUTION ORAL at 20:34

## 2021-04-03 RX ADMIN — HEPARIN SODIUM AND DEXTROSE 39.36 UNITS/KG/HR: 10000; 5 INJECTION INTRAVENOUS at 23:05

## 2021-04-03 RX ADMIN — OXYCODONE HYDROCHLORIDE 5 MG: 5 TABLET ORAL at 13:16

## 2021-04-03 RX ADMIN — OXYCODONE HYDROCHLORIDE 5 MG: 5 TABLET ORAL at 08:24

## 2021-04-03 RX ADMIN — ACETAMINOPHEN 1000 MG: 500 TABLET ORAL at 05:54

## 2021-04-03 RX ADMIN — POLYETHYLENE GLYCOL 3350 17 G: 17 POWDER, FOR SOLUTION ORAL at 09:14

## 2021-04-03 RX ADMIN — OXYCODONE HYDROCHLORIDE 5 MG: 5 TABLET ORAL at 19:12

## 2021-04-03 RX ADMIN — SODIUM CHLORIDE, PRESERVATIVE FREE 10 ML: 5 INJECTION INTRAVENOUS at 08:29

## 2021-04-03 RX ADMIN — HEPARIN SODIUM AND DEXTROSE 33.36 UNITS/KG/HR: 10000; 5 INJECTION INTRAVENOUS at 03:04

## 2021-04-03 RX ADMIN — METHOCARBAMOL TABLETS 750 MG: 750 TABLET, COATED ORAL at 18:09

## 2021-04-03 RX ADMIN — OXYCODONE HYDROCHLORIDE 5 MG: 5 TABLET ORAL at 00:59

## 2021-04-03 RX ADMIN — HEPARIN SODIUM 2000 UNITS: 1000 INJECTION INTRAVENOUS; SUBCUTANEOUS at 16:29

## 2021-04-03 RX ADMIN — METHOCARBAMOL TABLETS 750 MG: 750 TABLET, COATED ORAL at 05:54

## 2021-04-03 RX ADMIN — GABAPENTIN 600 MG: 300 CAPSULE ORAL at 20:34

## 2021-04-03 RX ADMIN — HEPARIN SODIUM AND DEXTROSE 35.37 UNITS/KG/HR: 10000; 5 INJECTION INTRAVENOUS at 13:43

## 2021-04-03 RX ADMIN — DOCUSATE SODIUM 50MG AND SENNOSIDES 8.6MG 1 TABLET: 8.6; 5 TABLET, FILM COATED ORAL at 20:34

## 2021-04-03 RX ADMIN — METHOCARBAMOL TABLETS 750 MG: 750 TABLET, COATED ORAL at 11:56

## 2021-04-03 RX ADMIN — METHOCARBAMOL TABLETS 750 MG: 750 TABLET, COATED ORAL at 00:59

## 2021-04-03 ASSESSMENT — PAIN SCALES - GENERAL
PAINLEVEL_OUTOF10: 6
PAINLEVEL_OUTOF10: 7
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
## 2021-04-04 LAB
PARTIAL THROMBOPLASTIN TIME: 47.6 SEC (ref 20.5–30.5)
PARTIAL THROMBOPLASTIN TIME: 54.9 SEC (ref 20.5–30.5)
PARTIAL THROMBOPLASTIN TIME: 92 SEC (ref 20.5–30.5)

## 2021-04-04 PROCEDURE — 6370000000 HC RX 637 (ALT 250 FOR IP): Performed by: STUDENT IN AN ORGANIZED HEALTH CARE EDUCATION/TRAINING PROGRAM

## 2021-04-04 PROCEDURE — 36415 COLL VENOUS BLD VENIPUNCTURE: CPT

## 2021-04-04 PROCEDURE — 6370000000 HC RX 637 (ALT 250 FOR IP): Performed by: NURSE PRACTITIONER

## 2021-04-04 PROCEDURE — 6370000000 HC RX 637 (ALT 250 FOR IP): Performed by: EMERGENCY MEDICINE

## 2021-04-04 PROCEDURE — 6360000002 HC RX W HCPCS: Performed by: STUDENT IN AN ORGANIZED HEALTH CARE EDUCATION/TRAINING PROGRAM

## 2021-04-04 PROCEDURE — 2580000003 HC RX 258: Performed by: STUDENT IN AN ORGANIZED HEALTH CARE EDUCATION/TRAINING PROGRAM

## 2021-04-04 PROCEDURE — 1200000000 HC SEMI PRIVATE

## 2021-04-04 PROCEDURE — 85730 THROMBOPLASTIN TIME PARTIAL: CPT

## 2021-04-04 RX ORDER — OXYCODONE HYDROCHLORIDE 5 MG/1
5 TABLET ORAL EVERY 8 HOURS PRN
Status: DISCONTINUED | OUTPATIENT
Start: 2021-04-04 | End: 2021-04-07

## 2021-04-04 RX ADMIN — GABAPENTIN 600 MG: 300 CAPSULE ORAL at 12:26

## 2021-04-04 RX ADMIN — METHOCARBAMOL TABLETS 750 MG: 750 TABLET, COATED ORAL at 17:58

## 2021-04-04 RX ADMIN — METHOCARBAMOL TABLETS 750 MG: 750 TABLET, COATED ORAL at 12:26

## 2021-04-04 RX ADMIN — ACETAMINOPHEN 1000 MG: 500 TABLET ORAL at 05:47

## 2021-04-04 RX ADMIN — HEPARIN SODIUM AND DEXTROSE 36.36 UNITS/KG/HR: 10000; 5 INJECTION INTRAVENOUS at 09:53

## 2021-04-04 RX ADMIN — POLYETHYLENE GLYCOL 3350 17 G: 17 POWDER, FOR SOLUTION ORAL at 09:52

## 2021-04-04 RX ADMIN — GABAPENTIN 600 MG: 300 CAPSULE ORAL at 20:37

## 2021-04-04 RX ADMIN — OXYCODONE HYDROCHLORIDE 5 MG: 5 TABLET ORAL at 17:58

## 2021-04-04 RX ADMIN — HEPARIN SODIUM AND DEXTROSE 36.36 UNITS/KG/HR: 10000; 5 INJECTION INTRAVENOUS at 20:41

## 2021-04-04 RX ADMIN — SODIUM CHLORIDE, PRESERVATIVE FREE 10 ML: 5 INJECTION INTRAVENOUS at 20:37

## 2021-04-04 RX ADMIN — ACETAMINOPHEN 1000 MG: 500 TABLET ORAL at 20:37

## 2021-04-04 RX ADMIN — METHOCARBAMOL TABLETS 750 MG: 750 TABLET, COATED ORAL at 05:47

## 2021-04-04 RX ADMIN — OXYCODONE HYDROCHLORIDE 5 MG: 5 TABLET ORAL at 09:52

## 2021-04-04 RX ADMIN — GABAPENTIN 600 MG: 300 CAPSULE ORAL at 05:47

## 2021-04-04 RX ADMIN — OXYCODONE HYDROCHLORIDE 5 MG: 5 TABLET ORAL at 02:20

## 2021-04-04 RX ADMIN — ACETAMINOPHEN 1000 MG: 500 TABLET ORAL at 12:26

## 2021-04-04 RX ADMIN — HYDROXYCHLOROQUINE SULFATE 200 MG: 200 TABLET, FILM COATED ORAL at 09:52

## 2021-04-04 ASSESSMENT — PAIN SCALES - GENERAL
PAINLEVEL_OUTOF10: 8
PAINLEVEL_OUTOF10: 7

## 2021-04-04 ASSESSMENT — PAIN DESCRIPTION - FREQUENCY: FREQUENCY: CONTINUOUS

## 2021-04-04 ASSESSMENT — PAIN DESCRIPTION - PAIN TYPE: TYPE: ACUTE PAIN;SURGICAL PAIN

## 2021-04-04 ASSESSMENT — PAIN DESCRIPTION - DESCRIPTORS: DESCRIPTORS: ACHING;CONSTANT

## 2021-04-04 ASSESSMENT — PAIN DESCRIPTION - ONSET: ONSET: ON-GOING

## 2021-04-04 NOTE — CARE COORDINATION
Transitional Planning:    Gen Surg and Plastics discussing on closure for open arm wound and if wound vac will be placed. Plan is for pt to go [facility] once medically cleared for discharge, and if they are able to accept if the plan is a wound vac.
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
## 2021-04-05 LAB
ABSOLUTE EOS #: 0.26 K/UL (ref 0–0.4)
ABSOLUTE IMMATURE GRANULOCYTE: 0.53 K/UL (ref 0–0.3)
ABSOLUTE LYMPH #: 2.11 K/UL (ref 1–4.8)
ABSOLUTE MONO #: 1.32 K/UL (ref 0.1–0.8)
BASOPHILS # BLD: 0 % (ref 0–2)
BASOPHILS ABSOLUTE: 0 K/UL (ref 0–0.2)
DIFFERENTIAL TYPE: ABNORMAL
EOSINOPHILS RELATIVE PERCENT: 2 % (ref 1–4)
HCT VFR BLD CALC: 24.2 % (ref 40.7–50.3)
HEMOGLOBIN: 7.5 G/DL (ref 13–17)
IMMATURE GRANULOCYTES: 4 %
LYMPHOCYTES # BLD: 16 % (ref 24–44)
MCH RBC QN AUTO: 31.1 PG (ref 25.2–33.5)
MCHC RBC AUTO-ENTMCNC: 31 G/DL (ref 28.4–34.8)
MCV RBC AUTO: 100.4 FL (ref 82.6–102.9)
MONOCYTES # BLD: 10 % (ref 1–7)
MORPHOLOGY: NORMAL
NRBC AUTOMATED: 0.3 PER 100 WBC
PARTIAL THROMBOPLASTIN TIME: 48.9 SEC (ref 20.5–30.5)
PARTIAL THROMBOPLASTIN TIME: 74.5 SEC (ref 20.5–30.5)
PARTIAL THROMBOPLASTIN TIME: 78.9 SEC (ref 20.5–30.5)
PDW BLD-RTO: 13.2 % (ref 11.8–14.4)
PLATELET # BLD: 552 K/UL (ref 138–453)
PLATELET ESTIMATE: ABNORMAL
PMV BLD AUTO: 8.5 FL (ref 8.1–13.5)
RBC # BLD: 2.41 M/UL (ref 4.21–5.77)
RBC # BLD: ABNORMAL 10*6/UL
SEG NEUTROPHILS: 68 % (ref 36–66)
SEGMENTED NEUTROPHILS ABSOLUTE COUNT: 8.98 K/UL (ref 1.8–7.7)
WBC # BLD: 13.2 K/UL (ref 3.5–11.3)
WBC # BLD: ABNORMAL 10*3/UL

## 2021-04-05 PROCEDURE — 85025 COMPLETE CBC W/AUTO DIFF WBC: CPT

## 2021-04-05 PROCEDURE — 6370000000 HC RX 637 (ALT 250 FOR IP): Performed by: EMERGENCY MEDICINE

## 2021-04-05 PROCEDURE — 6360000002 HC RX W HCPCS: Performed by: STUDENT IN AN ORGANIZED HEALTH CARE EDUCATION/TRAINING PROGRAM

## 2021-04-05 PROCEDURE — 6370000000 HC RX 637 (ALT 250 FOR IP): Performed by: STUDENT IN AN ORGANIZED HEALTH CARE EDUCATION/TRAINING PROGRAM

## 2021-04-05 PROCEDURE — 97530 THERAPEUTIC ACTIVITIES: CPT

## 2021-04-05 PROCEDURE — 36415 COLL VENOUS BLD VENIPUNCTURE: CPT

## 2021-04-05 PROCEDURE — 85730 THROMBOPLASTIN TIME PARTIAL: CPT

## 2021-04-05 PROCEDURE — 1200000000 HC SEMI PRIVATE

## 2021-04-05 PROCEDURE — 2580000003 HC RX 258: Performed by: STUDENT IN AN ORGANIZED HEALTH CARE EDUCATION/TRAINING PROGRAM

## 2021-04-05 PROCEDURE — 6370000000 HC RX 637 (ALT 250 FOR IP): Performed by: NURSE PRACTITIONER

## 2021-04-05 RX ORDER — NICOTINE 21 MG/24HR
1 PATCH, TRANSDERMAL 24 HOURS TRANSDERMAL DAILY
Status: DISCONTINUED | OUTPATIENT
Start: 2021-04-05 | End: 2021-04-14

## 2021-04-05 RX ADMIN — METHOCARBAMOL TABLETS 750 MG: 750 TABLET, COATED ORAL at 13:10

## 2021-04-05 RX ADMIN — OXYCODONE HYDROCHLORIDE 5 MG: 5 TABLET ORAL at 18:13

## 2021-04-05 RX ADMIN — METHOCARBAMOL TABLETS 750 MG: 750 TABLET, COATED ORAL at 00:13

## 2021-04-05 RX ADMIN — METHOCARBAMOL TABLETS 750 MG: 750 TABLET, COATED ORAL at 05:52

## 2021-04-05 RX ADMIN — OXYCODONE HYDROCHLORIDE 5 MG: 5 TABLET ORAL at 02:31

## 2021-04-05 RX ADMIN — HEPARIN SODIUM 2000 UNITS: 1000 INJECTION INTRAVENOUS; SUBCUTANEOUS at 13:11

## 2021-04-05 RX ADMIN — ACETAMINOPHEN 1000 MG: 500 TABLET ORAL at 13:11

## 2021-04-05 RX ADMIN — METHOCARBAMOL TABLETS 750 MG: 750 TABLET, COATED ORAL at 18:13

## 2021-04-05 RX ADMIN — DOCUSATE SODIUM 50MG AND SENNOSIDES 8.6MG 1 TABLET: 8.6; 5 TABLET, FILM COATED ORAL at 08:28

## 2021-04-05 RX ADMIN — SODIUM CHLORIDE, PRESERVATIVE FREE 10 ML: 5 INJECTION INTRAVENOUS at 08:28

## 2021-04-05 RX ADMIN — POLYETHYLENE GLYCOL 3350 17 G: 17 POWDER, FOR SOLUTION ORAL at 21:11

## 2021-04-05 RX ADMIN — GABAPENTIN 600 MG: 300 CAPSULE ORAL at 13:10

## 2021-04-05 RX ADMIN — ACETAMINOPHEN 1000 MG: 500 TABLET ORAL at 21:10

## 2021-04-05 RX ADMIN — ACETAMINOPHEN 1000 MG: 500 TABLET ORAL at 05:52

## 2021-04-05 RX ADMIN — DOCUSATE SODIUM 50MG AND SENNOSIDES 8.6MG 1 TABLET: 8.6; 5 TABLET, FILM COATED ORAL at 22:00

## 2021-04-05 RX ADMIN — HYDROXYCHLOROQUINE SULFATE 200 MG: 200 TABLET, FILM COATED ORAL at 08:28

## 2021-04-05 RX ADMIN — GABAPENTIN 600 MG: 300 CAPSULE ORAL at 05:52

## 2021-04-05 RX ADMIN — HEPARIN SODIUM AND DEXTROSE 36.36 UNITS/KG/HR: 10000; 5 INJECTION INTRAVENOUS at 05:52

## 2021-04-05 RX ADMIN — GABAPENTIN 600 MG: 300 CAPSULE ORAL at 22:13

## 2021-04-05 RX ADMIN — OXYCODONE HYDROCHLORIDE 5 MG: 5 TABLET ORAL at 08:33

## 2021-04-05 ASSESSMENT — PAIN DESCRIPTION - ORIENTATION: ORIENTATION: LEFT

## 2021-04-05 ASSESSMENT — PAIN - FUNCTIONAL ASSESSMENT: PAIN_FUNCTIONAL_ASSESSMENT: PREVENTS OR INTERFERES SOME ACTIVE ACTIVITIES AND ADLS

## 2021-04-05 ASSESSMENT — PAIN SCALES - GENERAL
PAINLEVEL_OUTOF10: 7

## 2021-04-05 ASSESSMENT — PAIN DESCRIPTION - FREQUENCY: FREQUENCY: CONTINUOUS

## 2021-04-05 ASSESSMENT — PAIN DESCRIPTION - PROGRESSION: CLINICAL_PROGRESSION: NOT CHANGED

## 2021-04-05 ASSESSMENT — PAIN DESCRIPTION - PAIN TYPE: TYPE: ACUTE PAIN;SURGICAL PAIN

## 2021-04-05 ASSESSMENT — PAIN DESCRIPTION - LOCATION: LOCATION: ARM
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
## 2021-04-05 NOTE — PROGRESS NOTES
Plastics - Cristian      Patient seen for dressing change. Wound clean. Some dessication. Most of muscle appears viable. A few areas appear dark. No evidence of infection. Swelling down slightly. Patient states still feels quite tight. Can move long, ring and small fingers fair. Small movement index and thumb. States it feels better with wrist held neutral. Asking about a splint/brace. Has been getting dressed with petrolatum gauze. Allowing it to dry out. Go to NS damp to damp dressings, change BID. Will follow. Will plan for grafting probably Friday.

## 2021-04-06 LAB
PARTIAL THROMBOPLASTIN TIME: 29.2 SEC (ref 20.5–30.5)
PARTIAL THROMBOPLASTIN TIME: 59.9 SEC (ref 20.5–30.5)
PARTIAL THROMBOPLASTIN TIME: 69.4 SEC (ref 20.5–30.5)

## 2021-04-06 PROCEDURE — 6370000000 HC RX 637 (ALT 250 FOR IP): Performed by: STUDENT IN AN ORGANIZED HEALTH CARE EDUCATION/TRAINING PROGRAM

## 2021-04-06 PROCEDURE — 85730 THROMBOPLASTIN TIME PARTIAL: CPT

## 2021-04-06 PROCEDURE — 1200000000 HC SEMI PRIVATE

## 2021-04-06 PROCEDURE — 6360000002 HC RX W HCPCS: Performed by: STUDENT IN AN ORGANIZED HEALTH CARE EDUCATION/TRAINING PROGRAM

## 2021-04-06 PROCEDURE — 6370000000 HC RX 637 (ALT 250 FOR IP): Performed by: NURSE PRACTITIONER

## 2021-04-06 PROCEDURE — 97535 SELF CARE MNGMENT TRAINING: CPT

## 2021-04-06 PROCEDURE — 6370000000 HC RX 637 (ALT 250 FOR IP): Performed by: EMERGENCY MEDICINE

## 2021-04-06 PROCEDURE — 36415 COLL VENOUS BLD VENIPUNCTURE: CPT

## 2021-04-06 PROCEDURE — 97110 THERAPEUTIC EXERCISES: CPT

## 2021-04-06 PROCEDURE — 2580000003 HC RX 258: Performed by: STUDENT IN AN ORGANIZED HEALTH CARE EDUCATION/TRAINING PROGRAM

## 2021-04-06 RX ADMIN — POLYETHYLENE GLYCOL 3350 17 G: 17 POWDER, FOR SOLUTION ORAL at 20:44

## 2021-04-06 RX ADMIN — POLYETHYLENE GLYCOL 3350 17 G: 17 POWDER, FOR SOLUTION ORAL at 08:31

## 2021-04-06 RX ADMIN — GABAPENTIN 600 MG: 300 CAPSULE ORAL at 15:03

## 2021-04-06 RX ADMIN — DOCUSATE SODIUM 50MG AND SENNOSIDES 8.6MG 1 TABLET: 8.6; 5 TABLET, FILM COATED ORAL at 20:31

## 2021-04-06 RX ADMIN — GABAPENTIN 600 MG: 300 CAPSULE ORAL at 05:49

## 2021-04-06 RX ADMIN — OXYCODONE HYDROCHLORIDE 5 MG: 5 TABLET ORAL at 11:30

## 2021-04-06 RX ADMIN — GABAPENTIN 600 MG: 300 CAPSULE ORAL at 22:01

## 2021-04-06 RX ADMIN — METHOCARBAMOL TABLETS 750 MG: 750 TABLET, COATED ORAL at 05:49

## 2021-04-06 RX ADMIN — HEPARIN SODIUM 4000 UNITS: 1000 INJECTION INTRAVENOUS; SUBCUTANEOUS at 03:21

## 2021-04-06 RX ADMIN — METHOCARBAMOL TABLETS 750 MG: 750 TABLET, COATED ORAL at 23:20

## 2021-04-06 RX ADMIN — DOCUSATE SODIUM 50MG AND SENNOSIDES 8.6MG 1 TABLET: 8.6; 5 TABLET, FILM COATED ORAL at 08:31

## 2021-04-06 RX ADMIN — METHOCARBAMOL TABLETS 750 MG: 750 TABLET, COATED ORAL at 18:37

## 2021-04-06 RX ADMIN — ACETAMINOPHEN 1000 MG: 500 TABLET ORAL at 22:01

## 2021-04-06 RX ADMIN — OXYCODONE HYDROCHLORIDE 5 MG: 5 TABLET ORAL at 02:36

## 2021-04-06 RX ADMIN — ACETAMINOPHEN 1000 MG: 500 TABLET ORAL at 05:49

## 2021-04-06 RX ADMIN — HYDROXYCHLOROQUINE SULFATE 200 MG: 200 TABLET, FILM COATED ORAL at 08:34

## 2021-04-06 RX ADMIN — HEPARIN SODIUM AND DEXTROSE 40.36 UNITS/KG/HR: 10000; 5 INJECTION INTRAVENOUS at 08:20

## 2021-04-06 RX ADMIN — SODIUM CHLORIDE, PRESERVATIVE FREE 10 ML: 5 INJECTION INTRAVENOUS at 20:31

## 2021-04-06 RX ADMIN — HEPARIN SODIUM AND DEXTROSE 40.36 UNITS/KG/HR: 10000; 5 INJECTION INTRAVENOUS at 17:07

## 2021-04-06 RX ADMIN — METHOCARBAMOL TABLETS 750 MG: 750 TABLET, COATED ORAL at 01:22

## 2021-04-06 RX ADMIN — ACETAMINOPHEN 1000 MG: 500 TABLET ORAL at 15:04

## 2021-04-06 RX ADMIN — OXYCODONE HYDROCHLORIDE 5 MG: 5 TABLET ORAL at 20:26

## 2021-04-06 RX ADMIN — METHOCARBAMOL TABLETS 750 MG: 750 TABLET, COATED ORAL at 11:30

## 2021-04-06 ASSESSMENT — PAIN SCALES - GENERAL
PAINLEVEL_OUTOF10: 8
PAINLEVEL_OUTOF10: 7

## 2021-04-06 ASSESSMENT — PAIN DESCRIPTION - DESCRIPTORS: DESCRIPTORS: ACHING;DISCOMFORT;SORE

## 2021-04-06 ASSESSMENT — PAIN SCALES - WONG BAKER: WONGBAKER_NUMERICALRESPONSE: 2

## 2021-04-06 ASSESSMENT — PAIN DESCRIPTION - ONSET: ONSET: ON-GOING

## 2021-04-06 ASSESSMENT — PAIN DESCRIPTION - PAIN TYPE: TYPE: ACUTE PAIN;SURGICAL PAIN

## 2021-04-06 ASSESSMENT — PAIN DESCRIPTION - LOCATION: LOCATION: ARM
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
## 2021-04-06 NOTE — PLAN OF CARE
Problem: Pain:  Goal: Pain level will decrease  Description: Pain level will decrease  Outcome: Ongoing  Goal: Control of acute pain  Description: Control of acute pain  Outcome: Ongoing  Goal: Control of chronic pain  Description: Control of chronic pain  Outcome: Ongoing     Problem: Nutrition  Goal: Optimal nutrition therapy  Outcome: Ongoing     Problem: Skin Integrity:  Goal: Will show no infection signs and symptoms  Description: Will show no infection signs and symptoms  Outcome: Ongoing  Goal: Absence of new skin breakdown  Description: Absence of new skin breakdown  Outcome: Ongoing     Problem: Suicide risk  Goal: Provide patient with safe environment  Description: Provide patient with safe environment  Outcome: Ongoing
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
## 2021-04-06 NOTE — PROGRESS NOTES
Performed dressing change to patient's LUE. Removed old dressings. All pulses were checked with doppler. Wound was cleansed with saline and then new dressings of petroleum gauze applied to wound, followed by dry dressing and abdominal pad. Secured with kerlix and Ace wrap. Patient able to move fingers, readjusted hand and wrist to help with swelling by keeping it elevated.

## 2021-04-07 LAB
ABSOLUTE EOS #: 0.33 K/UL (ref 0–0.44)
ABSOLUTE IMMATURE GRANULOCYTE: 0.67 K/UL (ref 0–0.3)
ABSOLUTE LYMPH #: 2.67 K/UL (ref 1.1–3.7)
ABSOLUTE MONO #: 1.5 K/UL (ref 0.1–1.2)
BASOPHILS # BLD: 1 % (ref 0–2)
BASOPHILS ABSOLUTE: 0.17 K/UL (ref 0–0.2)
DIFFERENTIAL TYPE: ABNORMAL
EOSINOPHILS RELATIVE PERCENT: 2 % (ref 1–4)
HCT VFR BLD CALC: 24.2 % (ref 40.7–50.3)
HEMOGLOBIN: 7.6 G/DL (ref 13–17)
IMMATURE GRANULOCYTES: 4 %
LYMPHOCYTES # BLD: 16 % (ref 24–43)
MCH RBC QN AUTO: 31.9 PG (ref 25.2–33.5)
MCHC RBC AUTO-ENTMCNC: 31.4 G/DL (ref 28.4–34.8)
MCV RBC AUTO: 101.7 FL (ref 82.6–102.9)
MONOCYTES # BLD: 9 % (ref 3–12)
MORPHOLOGY: NORMAL
NRBC AUTOMATED: 0.3 PER 100 WBC
PARTIAL THROMBOPLASTIN TIME: 37.8 SEC (ref 20.5–30.5)
PARTIAL THROMBOPLASTIN TIME: 42.7 SEC (ref 20.5–30.5)
PARTIAL THROMBOPLASTIN TIME: 70.5 SEC (ref 20.5–30.5)
PARTIAL THROMBOPLASTIN TIME: 82.5 SEC (ref 20.5–30.5)
PARTIAL THROMBOPLASTIN TIME: >120 SEC (ref 20.5–30.5)
PDW BLD-RTO: 13.4 % (ref 11.8–14.4)
PLATELET # BLD: 769 K/UL (ref 138–453)
PLATELET ESTIMATE: ABNORMAL
PMV BLD AUTO: 8.8 FL (ref 8.1–13.5)
RBC # BLD: 2.38 M/UL (ref 4.21–5.77)
RBC # BLD: ABNORMAL 10*6/UL
SEG NEUTROPHILS: 68 % (ref 36–65)
SEGMENTED NEUTROPHILS ABSOLUTE COUNT: 11.36 K/UL (ref 1.5–8.1)
WBC # BLD: 16.7 K/UL (ref 3.5–11.3)
WBC # BLD: ABNORMAL 10*3/UL

## 2021-04-07 PROCEDURE — 6360000002 HC RX W HCPCS: Performed by: NURSE PRACTITIONER

## 2021-04-07 PROCEDURE — 6370000000 HC RX 637 (ALT 250 FOR IP): Performed by: NURSE PRACTITIONER

## 2021-04-07 PROCEDURE — 6360000002 HC RX W HCPCS: Performed by: STUDENT IN AN ORGANIZED HEALTH CARE EDUCATION/TRAINING PROGRAM

## 2021-04-07 PROCEDURE — 6370000000 HC RX 637 (ALT 250 FOR IP): Performed by: EMERGENCY MEDICINE

## 2021-04-07 PROCEDURE — 97535 SELF CARE MNGMENT TRAINING: CPT

## 2021-04-07 PROCEDURE — 85730 THROMBOPLASTIN TIME PARTIAL: CPT

## 2021-04-07 PROCEDURE — 6370000000 HC RX 637 (ALT 250 FOR IP): Performed by: STUDENT IN AN ORGANIZED HEALTH CARE EDUCATION/TRAINING PROGRAM

## 2021-04-07 PROCEDURE — 36415 COLL VENOUS BLD VENIPUNCTURE: CPT

## 2021-04-07 PROCEDURE — 2580000003 HC RX 258: Performed by: NURSE PRACTITIONER

## 2021-04-07 PROCEDURE — 1200000000 HC SEMI PRIVATE

## 2021-04-07 PROCEDURE — 85025 COMPLETE CBC W/AUTO DIFF WBC: CPT

## 2021-04-07 PROCEDURE — 6360000002 HC RX W HCPCS: Performed by: PLASTIC SURGERY

## 2021-04-07 RX ORDER — OXYCODONE HYDROCHLORIDE 5 MG/1
5 TABLET ORAL EVERY 12 HOURS PRN
Status: DISCONTINUED | OUTPATIENT
Start: 2021-04-07 | End: 2021-04-08

## 2021-04-07 RX ORDER — OXYCODONE HYDROCHLORIDE 5 MG/1
5 TABLET ORAL ONCE
Status: COMPLETED | OUTPATIENT
Start: 2021-04-07 | End: 2021-04-07

## 2021-04-07 RX ORDER — HEPARIN SODIUM 10000 [USP'U]/100ML
5-50 INJECTION, SOLUTION INTRAVENOUS CONTINUOUS
Status: DISCONTINUED | OUTPATIENT
Start: 2021-04-07 | End: 2021-04-19

## 2021-04-07 RX ADMIN — METHOCARBAMOL TABLETS 750 MG: 750 TABLET, COATED ORAL at 12:16

## 2021-04-07 RX ADMIN — HEPARIN SODIUM 2000 UNITS: 1000 INJECTION INTRAVENOUS; SUBCUTANEOUS at 04:52

## 2021-04-07 RX ADMIN — METHOCARBAMOL TABLETS 750 MG: 750 TABLET, COATED ORAL at 18:09

## 2021-04-07 RX ADMIN — POLYETHYLENE GLYCOL 3350 17 G: 17 POWDER, FOR SOLUTION ORAL at 19:36

## 2021-04-07 RX ADMIN — DOCUSATE SODIUM 50MG AND SENNOSIDES 8.6MG 1 TABLET: 8.6; 5 TABLET, FILM COATED ORAL at 08:47

## 2021-04-07 RX ADMIN — ACETAMINOPHEN 1000 MG: 500 TABLET ORAL at 14:03

## 2021-04-07 RX ADMIN — GABAPENTIN 600 MG: 300 CAPSULE ORAL at 06:02

## 2021-04-07 RX ADMIN — HEPARIN SODIUM AND DEXTROSE 38.34 UNITS/KG/HR: 10000; 5 INJECTION INTRAVENOUS at 13:06

## 2021-04-07 RX ADMIN — METHOCARBAMOL TABLETS 750 MG: 750 TABLET, COATED ORAL at 23:16

## 2021-04-07 RX ADMIN — GABAPENTIN 600 MG: 300 CAPSULE ORAL at 21:30

## 2021-04-07 RX ADMIN — POLYETHYLENE GLYCOL 3350 17 G: 17 POWDER, FOR SOLUTION ORAL at 08:47

## 2021-04-07 RX ADMIN — OXYCODONE HYDROCHLORIDE 5 MG: 5 TABLET ORAL at 06:44

## 2021-04-07 RX ADMIN — DOCUSATE SODIUM 50MG AND SENNOSIDES 8.6MG 1 TABLET: 8.6; 5 TABLET, FILM COATED ORAL at 19:36

## 2021-04-07 RX ADMIN — ACETAMINOPHEN 1000 MG: 500 TABLET ORAL at 21:28

## 2021-04-07 RX ADMIN — GABAPENTIN 600 MG: 300 CAPSULE ORAL at 14:02

## 2021-04-07 RX ADMIN — HEPARIN SODIUM AND DEXTROSE 40.36 UNITS/KG/HR: 10000; 5 INJECTION INTRAVENOUS at 02:25

## 2021-04-07 RX ADMIN — HEPARIN SODIUM 2000 UNITS: 1000 INJECTION INTRAVENOUS; SUBCUTANEOUS at 13:11

## 2021-04-07 RX ADMIN — ACETAMINOPHEN 1000 MG: 500 TABLET ORAL at 05:00

## 2021-04-07 RX ADMIN — HYDROXYCHLOROQUINE SULFATE 200 MG: 200 TABLET, FILM COATED ORAL at 08:47

## 2021-04-07 RX ADMIN — HEPARIN SODIUM AND DEXTROSE 38.34 UNITS/KG/HR: 10000; 5 INJECTION INTRAVENOUS at 21:37

## 2021-04-07 RX ADMIN — OXYCODONE HYDROCHLORIDE 5 MG: 5 TABLET ORAL at 19:25

## 2021-04-07 RX ADMIN — OXYCODONE HYDROCHLORIDE 5 MG: 5 TABLET ORAL at 14:04

## 2021-04-07 RX ADMIN — PIPERACILLIN AND TAZOBACTAM 4500 MG: 4; .5 INJECTION, POWDER, LYOPHILIZED, FOR SOLUTION INTRAVENOUS; PARENTERAL at 18:09

## 2021-04-07 RX ADMIN — METHOCARBAMOL TABLETS 750 MG: 750 TABLET, COATED ORAL at 05:00

## 2021-04-07 ASSESSMENT — PAIN DESCRIPTION - ORIENTATION: ORIENTATION: LEFT

## 2021-04-07 ASSESSMENT — PAIN - FUNCTIONAL ASSESSMENT: PAIN_FUNCTIONAL_ASSESSMENT: PREVENTS OR INTERFERES SOME ACTIVE ACTIVITIES AND ADLS

## 2021-04-07 ASSESSMENT — PAIN DESCRIPTION - DESCRIPTORS: DESCRIPTORS: ACHING;DISCOMFORT;SORE

## 2021-04-07 ASSESSMENT — PAIN SCALES - GENERAL
PAINLEVEL_OUTOF10: 7
PAINLEVEL_OUTOF10: 8
PAINLEVEL_OUTOF10: 7
PAINLEVEL_OUTOF10: 7
PAINLEVEL_OUTOF10: 8

## 2021-04-07 ASSESSMENT — PAIN DESCRIPTION - PAIN TYPE
TYPE: ACUTE PAIN
TYPE: ACUTE PAIN

## 2021-04-07 ASSESSMENT — PAIN DESCRIPTION - ONSET: ONSET: ON-GOING

## 2021-04-07 NOTE — PROGRESS NOTES
Writer called pharmacy about decrease in patients weight and to have heparin drip adjusted accordingly. Per Pharmacy adjust drip as directed by new weight. See MAR for new Heparin rate.

## 2021-04-07 NOTE — PROGRESS NOTES
Performed dressing change to patient's LUE. Removed old dressings and cleansed with NS. All pulses were checked with doppler. New dressings applied using  NS damp to damp dressings per ,  note on 4/4. Patient able to move fingers,skin cool to touch and a some sensation noted. Hand was elevated on pillows. Patient tolerated dressing change well and participated with care.
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
## 2021-04-07 NOTE — PROGRESS NOTES
PROGRESS NOTE          PATIENT NAME: Jacklyn  [facility] RECORD NO. [id number]  DATE: 2021  SURGEON: Dr. Kd Lorenzo: Hayder Jaimes, APRN - CNP    HD: # 12    ASSESSMENT    Patient Active Problem List   Diagnosis    Trauma    Acute blood loss anemia    Laceration of left median nerve    Laceration of left radial nerve    Acute psychosis ([facility] [address]    Aggression       MEDICAL DECISION MAKING AND PLAN    1. MMPT  2. History of Bipolar  1. Psych recommend sitter, BHI at DC  3. pulses radial and ulnar artery checked during dressing change today  4. Dressing change BID per nursing, we changed this AM,    5. Plastics recs coverage/closure Friday. 6. DVT proph, heparin gtt, Eliquis when DC'd 10 BID x7day then 5 BID  1. APTT has been subtherapeutic   7. History of LUPUS  1. On home dose of Hydroxychloroquine 200mg qD      Chief Complaint: \"My right lower ribs hurt still, pain improved\"    [address] [facility] is has slightly improved since yesterday. Patient reports that his left lower ribs still hurt, lightly tender to palpation and physical exam, worsens with inspiration. Explained that I will reevaluate imaging on his arrival to ensure there is no rib fracture or any other damage. Patient reports the pain is very mild and stable. He reports his left arm pain is improved from yesterday. Patient denies any nausea, vomiting, shortness of breath, fever chills, abdominal pain change in urination bowel habits. Vital signs noted to be stable. Patient resting comfortably in bed.       OBJECTIVE  VITALS: Temp: Temp: 97.6 °F (36.4 °C)Temp  Av °F (36.7 °C)  Min: 97.6 °F (36.4 °C)  Max: 98.9 °F (94.8 °C) BP Systolic (36XIM), KRZ:956 , Min:111 , WXJ:113   Diastolic (43KLL), TEQ:72, Min:67, Max:68   Pulse Pulse  Av.7  Min: 86  Max: 108 Resp Resp  Av.7  Min: 12  Max: 16 Pulse ox SpO2  Av %  Min: 99 %  Max: 99 %  GENERAL: alert, no distress  NEURO: Grossly intact except decreased sensation of the left distal digits and decreased motor of the left hand. HEENT: Atraumatic, normocephalic  : deferred  LUNGS: Equal chest rise and fall, no accessory muscle use, no respiratory distress  HEART: 2+ radial pulse of the right hand with normal rate and regular rhythm  ABDOMEN: soft, non-tender, non-distended, bowel sounds present in all 4 quadrants and no guarding or peritoneal signs present  EXTREMITY: no cyanosis, clubbing or edema    I/O last 3 completed shifts: In: 2213.5 [P.O.:1300; I.V.:913.5]  Out: -     Drain/tube output: In: 913.5 [I.V.:913.5]  Out: -     LAB:  CBC:   Recent Labs     04/05/21  0427 04/07/21  0409   WBC 13.2* 16.7*   HGB 7.5* 7.6*   HCT 24.2* 24.2*   .4 101.7   * 769*     BMP: No results for input(s): NA, K, CL, CO2, BUN, CREATININE, GLUCOSE in the last 72 hours. COAGS:   Recent Labs     04/06/21  1440 04/07/21  0409 04/07/21  0602   APTT 69.4* 42.7* >120.0*       RADIOLOGY:  No results found. Leta Galvan DO  4/7/21, 7:57 AM         Attending Note      I have reviewed the above [facility] note(s) and I either performed the key elements of the medical history and physical exam or was present with the resident when the key elements of the medical history and physical exam were performed. I have discussed the findings, established the care plan and recommendations with Resident, TECSS RN, bedside nurse.     Flor Aguilar MD  4/7/2021  4:05 PM

## 2021-04-07 NOTE — PROGRESS NOTES
Pts repeat PTT came back at 37.8. Writer reached out to Dr. Felice Kayser again regardign heparin drip. Notified them that rate is currently running higher than recommended rate in MAR. Writer reached out to pharmacist and was informed per pharmacist that this can be anticipated and to suggest hem/onc consult. Writer suggested hem/onc consult to Dr. Felice Kayser. New orders placed, Heparin order modified.

## 2021-04-07 NOTE — PLAN OF CARE
Problem: Pain:  Goal: Pain level will decrease  Description: Pain level will decrease  4/7/2021 1837 by Carlo Sanchez RN  Outcome: Ongoing  4/7/2021 0655 by Urszula Friedman RN  Outcome: Ongoing  Goal: Control of acute pain  Description: Control of acute pain  4/7/2021 1837 by Carlo Sanchez RN  Outcome: Ongoing  4/7/2021 0655 by Urszula Friedman RN  Outcome: Ongoing  Goal: Control of chronic pain  Description: Control of chronic pain  4/7/2021 1837 by Carlo Sanchez RN  Outcome: Ongoing  4/7/2021 0655 by Urszula Friedman RN  Outcome: Ongoing     Problem: Nutrition  Goal: Optimal nutrition therapy  4/7/2021 1837 by Carlo Sanchez RN  Outcome: Ongoing  4/7/2021 0655 by Urszula Friedman RN  Outcome: Ongoing     Problem: Skin Integrity:  Goal: Will show no infection signs and symptoms  Description: Will show no infection signs and symptoms  4/7/2021 1837 by Carlo Sanchez RN  Outcome: Ongoing  4/7/2021 0655 by Urszula Friedman RN  Outcome: Ongoing  Goal: Absence of new skin breakdown  Description: Absence of new skin breakdown  4/7/2021 1837 by Carlo Sanchez RN  Outcome: Ongoing  4/7/2021 0655 by Urszula Friedman RN  Outcome: Ongoing     Problem: Suicide risk  Goal: Provide patient with safe environment  Description: Provide patient with safe environment  4/7/2021 1837 by Carlo Sanchez RN  Outcome: Ongoing  4/7/2021 0655 by Urszula Friedman RN  Outcome: Ongoing

## 2021-04-07 NOTE — PLAN OF CARE
Problem: Pain:  Goal: Pain level will decrease  Description: Pain level will decrease  4/7/2021 0655 by Deyvi Corbin RN  Outcome: Ongoing  4/6/2021 1728 by Josh Parson RN  Outcome: Ongoing  Goal: Control of acute pain  Description: Control of acute pain  4/7/2021 0655 by Deyvi Corbin RN  Outcome: Ongoing  4/6/2021 1728 by Josh Parson RN  Outcome: Ongoing  Goal: Control of chronic pain  Description: Control of chronic pain  4/7/2021 0655 by Deyvi Corbin RN  Outcome: Ongoing  4/6/2021 1728 by Josh Parson RN  Outcome: Ongoing     Problem: Nutrition  Goal: Optimal nutrition therapy  4/7/2021 0655 by Deyvi Corbin RN  Outcome: Ongoing  4/6/2021 1728 by Josh Parson RN  Outcome: Ongoing     Problem: Skin Integrity:  Goal: Will show no infection signs and symptoms  Description: Will show no infection signs and symptoms  4/7/2021 0655 by Deyvi Corbin RN  Outcome: Ongoing  4/6/2021 1728 by Josh Parson RN  Outcome: Ongoing  Goal: Absence of new skin breakdown  Description: Absence of new skin breakdown  4/7/2021 0655 by Deyvi Corbin RN  Outcome: Ongoing  4/6/2021 1728 by Josh Parson RN  Outcome: Ongoing     Problem: Suicide risk  Goal: Provide patient with safe environment  Description: Provide patient with safe environment  4/7/2021 0655 by Deyvi Corbin RN  Outcome: Ongoing  4/6/2021 1728 by Josh Parson RN  Outcome: Ongoing

## 2021-04-08 ENCOUNTER — ANESTHESIA EVENT (OUTPATIENT)
Dept: OPERATING ROOM | Age: 41
DRG: 904 | End: 2021-04-08
Payer: COMMERCIAL

## 2021-04-08 LAB
ABSOLUTE EOS #: 0.18 K/UL (ref 0–0.44)
ABSOLUTE IMMATURE GRANULOCYTE: 0.48 K/UL (ref 0–0.3)
ABSOLUTE LYMPH #: 1.93 K/UL (ref 1.1–3.7)
ABSOLUTE MONO #: 1.42 K/UL (ref 0.1–1.2)
BASOPHILS # BLD: 0 % (ref 0–2)
BASOPHILS ABSOLUTE: 0.04 K/UL (ref 0–0.2)
DIFFERENTIAL TYPE: ABNORMAL
EOSINOPHILS RELATIVE PERCENT: 1 % (ref 1–4)
HCT VFR BLD CALC: 26.2 % (ref 40.7–50.3)
HEMOGLOBIN: 8 G/DL (ref 13–17)
IMMATURE GRANULOCYTES: 3 %
LYMPHOCYTES # BLD: 11 % (ref 24–43)
MCH RBC QN AUTO: 31.5 PG (ref 25.2–33.5)
MCHC RBC AUTO-ENTMCNC: 30.5 G/DL (ref 28.4–34.8)
MCV RBC AUTO: 103.1 FL (ref 82.6–102.9)
MONOCYTES # BLD: 8 % (ref 3–12)
NRBC AUTOMATED: 0.3 PER 100 WBC
PARTIAL THROMBOPLASTIN TIME: 46.9 SEC (ref 20.5–30.5)
PARTIAL THROMBOPLASTIN TIME: 68.7 SEC (ref 20.5–30.5)
PARTIAL THROMBOPLASTIN TIME: 72.8 SEC (ref 20.5–30.5)
PDW BLD-RTO: 13.6 % (ref 11.8–14.4)
PLATELET # BLD: 722 K/UL (ref 138–453)
PLATELET ESTIMATE: ABNORMAL
PMV BLD AUTO: 9 FL (ref 8.1–13.5)
RBC # BLD: 2.54 M/UL (ref 4.21–5.77)
RBC # BLD: ABNORMAL 10*6/UL
SEG NEUTROPHILS: 77 % (ref 36–65)
SEGMENTED NEUTROPHILS ABSOLUTE COUNT: 13.5 K/UL (ref 1.5–8.1)
WBC # BLD: 17.6 K/UL (ref 3.5–11.3)
WBC # BLD: ABNORMAL 10*3/UL

## 2021-04-08 PROCEDURE — 6360000002 HC RX W HCPCS: Performed by: PLASTIC SURGERY

## 2021-04-08 PROCEDURE — 6360000002 HC RX W HCPCS: Performed by: STUDENT IN AN ORGANIZED HEALTH CARE EDUCATION/TRAINING PROGRAM

## 2021-04-08 PROCEDURE — 85025 COMPLETE CBC W/AUTO DIFF WBC: CPT

## 2021-04-08 PROCEDURE — 6370000000 HC RX 637 (ALT 250 FOR IP): Performed by: STUDENT IN AN ORGANIZED HEALTH CARE EDUCATION/TRAINING PROGRAM

## 2021-04-08 PROCEDURE — 6360000002 HC RX W HCPCS: Performed by: NURSE PRACTITIONER

## 2021-04-08 PROCEDURE — 85730 THROMBOPLASTIN TIME PARTIAL: CPT

## 2021-04-08 PROCEDURE — 1200000000 HC SEMI PRIVATE

## 2021-04-08 PROCEDURE — 6370000000 HC RX 637 (ALT 250 FOR IP): Performed by: EMERGENCY MEDICINE

## 2021-04-08 PROCEDURE — 36415 COLL VENOUS BLD VENIPUNCTURE: CPT

## 2021-04-08 PROCEDURE — 6370000000 HC RX 637 (ALT 250 FOR IP): Performed by: NURSE PRACTITIONER

## 2021-04-08 PROCEDURE — 2580000003 HC RX 258: Performed by: NURSE PRACTITIONER

## 2021-04-08 RX ORDER — IBUPROFEN 400 MG/1
400 TABLET ORAL EVERY 6 HOURS
Status: DISCONTINUED | OUTPATIENT
Start: 2021-04-08 | End: 2021-04-08

## 2021-04-08 RX ORDER — IBUPROFEN 400 MG/1
400 TABLET ORAL EVERY 4 HOURS PRN
Status: DISCONTINUED | OUTPATIENT
Start: 2021-04-08 | End: 2021-04-08

## 2021-04-08 RX ORDER — SODIUM CHLORIDE 9 MG/ML
INJECTION, SOLUTION INTRAVENOUS CONTINUOUS
Status: DISCONTINUED | OUTPATIENT
Start: 2021-04-08 | End: 2021-04-09

## 2021-04-08 RX ORDER — FAMOTIDINE 20 MG/1
20 TABLET, FILM COATED ORAL 2 TIMES DAILY
Status: DISCONTINUED | OUTPATIENT
Start: 2021-04-08 | End: 2021-04-19

## 2021-04-08 RX ORDER — KETOROLAC TROMETHAMINE 15 MG/ML
15 INJECTION, SOLUTION INTRAMUSCULAR; INTRAVENOUS ONCE
Status: COMPLETED | OUTPATIENT
Start: 2021-04-08 | End: 2021-04-08

## 2021-04-08 RX ADMIN — METHOCARBAMOL TABLETS 750 MG: 750 TABLET, COATED ORAL at 05:29

## 2021-04-08 RX ADMIN — HYDROXYCHLOROQUINE SULFATE 200 MG: 200 TABLET, FILM COATED ORAL at 08:21

## 2021-04-08 RX ADMIN — FAMOTIDINE 20 MG: 20 TABLET, FILM COATED ORAL at 11:07

## 2021-04-08 RX ADMIN — POLYETHYLENE GLYCOL 3350 17 G: 17 POWDER, FOR SOLUTION ORAL at 21:29

## 2021-04-08 RX ADMIN — METHOCARBAMOL TABLETS 750 MG: 750 TABLET, COATED ORAL at 17:58

## 2021-04-08 RX ADMIN — FAMOTIDINE 20 MG: 20 TABLET, FILM COATED ORAL at 21:33

## 2021-04-08 RX ADMIN — METHOCARBAMOL TABLETS 750 MG: 750 TABLET, COATED ORAL at 13:00

## 2021-04-08 RX ADMIN — SODIUM CHLORIDE: 9 INJECTION, SOLUTION INTRAVENOUS at 20:04

## 2021-04-08 RX ADMIN — DOCUSATE SODIUM 50MG AND SENNOSIDES 8.6MG 1 TABLET: 8.6; 5 TABLET, FILM COATED ORAL at 08:21

## 2021-04-08 RX ADMIN — HEPARIN SODIUM AND DEXTROSE 38.34 UNITS/KG/HR: 10000; 5 INJECTION INTRAVENOUS at 17:59

## 2021-04-08 RX ADMIN — HEPARIN SODIUM 2000 UNITS: 1000 INJECTION INTRAVENOUS; SUBCUTANEOUS at 12:59

## 2021-04-08 RX ADMIN — ACETAMINOPHEN 1000 MG: 500 TABLET ORAL at 05:32

## 2021-04-08 RX ADMIN — PIPERACILLIN AND TAZOBACTAM 4500 MG: 4; .5 INJECTION, POWDER, LYOPHILIZED, FOR SOLUTION INTRAVENOUS; PARENTERAL at 21:30

## 2021-04-08 RX ADMIN — GABAPENTIN 600 MG: 300 CAPSULE ORAL at 21:29

## 2021-04-08 RX ADMIN — METHOCARBAMOL TABLETS 750 MG: 750 TABLET, COATED ORAL at 23:14

## 2021-04-08 RX ADMIN — IBUPROFEN 400 MG: 400 TABLET, FILM COATED ORAL at 04:25

## 2021-04-08 RX ADMIN — HEPARIN SODIUM AND DEXTROSE 36.34 UNITS/KG/HR: 10000; 5 INJECTION INTRAVENOUS at 07:16

## 2021-04-08 RX ADMIN — PIPERACILLIN AND TAZOBACTAM 4500 MG: 4; .5 INJECTION, POWDER, LYOPHILIZED, FOR SOLUTION INTRAVENOUS; PARENTERAL at 13:57

## 2021-04-08 RX ADMIN — POLYETHYLENE GLYCOL 3350 17 G: 17 POWDER, FOR SOLUTION ORAL at 08:20

## 2021-04-08 RX ADMIN — ACETAMINOPHEN 1000 MG: 500 TABLET ORAL at 13:57

## 2021-04-08 RX ADMIN — DOCUSATE SODIUM 50MG AND SENNOSIDES 8.6MG 1 TABLET: 8.6; 5 TABLET, FILM COATED ORAL at 21:31

## 2021-04-08 RX ADMIN — KETOROLAC TROMETHAMINE 15 MG: 15 INJECTION, SOLUTION INTRAMUSCULAR; INTRAVENOUS at 16:43

## 2021-04-08 RX ADMIN — ACETAMINOPHEN 1000 MG: 500 TABLET ORAL at 21:29

## 2021-04-08 RX ADMIN — PIPERACILLIN AND TAZOBACTAM 4500 MG: 4; .5 INJECTION, POWDER, LYOPHILIZED, FOR SOLUTION INTRAVENOUS; PARENTERAL at 05:30

## 2021-04-08 RX ADMIN — GABAPENTIN 600 MG: 300 CAPSULE ORAL at 16:43

## 2021-04-08 RX ADMIN — GABAPENTIN 600 MG: 300 CAPSULE ORAL at 07:23

## 2021-04-08 RX ADMIN — HEPARIN SODIUM AND DEXTROSE 38.34 UNITS/KG/HR: 10000; 5 INJECTION INTRAVENOUS at 12:58

## 2021-04-08 ASSESSMENT — PAIN - FUNCTIONAL ASSESSMENT: PAIN_FUNCTIONAL_ASSESSMENT: PREVENTS OR INTERFERES SOME ACTIVE ACTIVITIES AND ADLS

## 2021-04-08 ASSESSMENT — PAIN DESCRIPTION - DESCRIPTORS: DESCRIPTORS: ACHING

## 2021-04-08 ASSESSMENT — PAIN SCALES - GENERAL
PAINLEVEL_OUTOF10: 7
PAINLEVEL_OUTOF10: 8
PAINLEVEL_OUTOF10: 8

## 2021-04-08 ASSESSMENT — PAIN DESCRIPTION - LOCATION: LOCATION: ARM

## 2021-04-08 ASSESSMENT — PAIN DESCRIPTION - ORIENTATION: ORIENTATION: LEFT

## 2021-04-08 ASSESSMENT — PAIN DESCRIPTION - FREQUENCY: FREQUENCY: CONTINUOUS

## 2021-04-08 ASSESSMENT — PAIN DESCRIPTION - PROGRESSION: CLINICAL_PROGRESSION: NOT CHANGED

## 2021-04-08 ASSESSMENT — PAIN DESCRIPTION - PAIN TYPE: TYPE: ACUTE PAIN
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
## 2021-04-08 NOTE — PROGRESS NOTES
Occupational Therapy    Occupational Therapy Not Seen Note    DATE: 2021  Name: Iris Ray  : 1980  MRN: [id number]    Patient not available for Occupational Therapy due to: Other:  Multiple family members arrived to visit pt d/t upcoming surgery .         Electronically signed by GOPI Shaikh on 2021 at 2:47 PM

## 2021-04-08 NOTE — PLAN OF CARE
Problem: Pain:  Goal: Pain level will decrease  Description: Pain level will decrease  4/8/2021 1547 by Sahra Richard RN  Outcome: Ongoing  4/8/2021 0509 by Rosemary Uribe RN  Outcome: Ongoing  Goal: Control of acute pain  Description: Control of acute pain  4/8/2021 1547 by Sahra Richard RN  Outcome: Ongoing  4/8/2021 0509 by Rosemary Uribe RN  Outcome: Ongoing  Goal: Control of chronic pain  Description: Control of chronic pain  4/8/2021 1547 by Sahra Richard RN  Outcome: Ongoing  4/8/2021 0509 by Rosemary Uribe RN  Outcome: Ongoing     Problem: Nutrition  Goal: Optimal nutrition therapy  4/8/2021 1547 by Sahra Richard RN  Outcome: Ongoing  4/8/2021 0509 by Rosemary Uribe RN  Outcome: Ongoing     Problem: Skin Integrity:  Goal: Will show no infection signs and symptoms  Description: Will show no infection signs and symptoms  4/8/2021 1547 by Sahra Richard RN  Outcome: Ongoing  4/8/2021 0509 by Rosemary Uribe RN  Outcome: Ongoing  Goal: Absence of new skin breakdown  Description: Absence of new skin breakdown  4/8/2021 1547 by Sahra Richard RN  Outcome: Ongoing  4/8/2021 0509 by Rosemary Uribe RN  Outcome: Ongoing     Problem: Suicide risk  Goal: Provide patient with safe environment  Description: Provide patient with safe environment  4/8/2021 1547 by Sahra Richard RN  Outcome: Ongoing  4/8/2021 0509 by Rosemary Uribe RN  Outcome: Ongoing

## 2021-04-08 NOTE — PLAN OF CARE
Problem: Pain:  Goal: Pain level will decrease  Description: Pain level will decrease  4/8/2021 0509 by Janiya Izquierdo RN  Outcome: Ongoing  4/7/2021 1837 by Josh Figueroa RN  Outcome: Ongoing  Goal: Control of acute pain  Description: Control of acute pain  4/8/2021 0509 by Janiya Izquierdo RN  Outcome: Ongoing  4/7/2021 1837 by Josh Figueroa RN  Outcome: Ongoing  Goal: Control of chronic pain  Description: Control of chronic pain  4/8/2021 0509 by Janiya Izquierdo RN  Outcome: Ongoing  4/7/2021 1837 by Josh Figueroa RN  Outcome: Ongoing     Problem: Nutrition  Goal: Optimal nutrition therapy  4/8/2021 0509 by Janiya Izquierdo RN  Outcome: Ongoing  4/7/2021 1837 by Josh Figueroa RN  Outcome: Ongoing     Problem: Skin Integrity:  Goal: Will show no infection signs and symptoms  Description: Will show no infection signs and symptoms  4/8/2021 0509 by Janiya Izquierdo RN  Outcome: Ongoing  4/7/2021 1837 by Josh Figueroa RN  Outcome: Ongoing  Goal: Absence of new skin breakdown  Description: Absence of new skin breakdown  4/8/2021 0509 by Janiya Izquierdo RN  Outcome: Ongoing  4/7/2021 1837 by Josh Figueroa RN  Outcome: Ongoing     Problem: Suicide risk  Goal: Provide patient with safe environment  Description: Provide patient with safe environment  4/8/2021 0509 by Janiya Izquierdo RN  Outcome: Ongoing  4/7/2021 1837 by Josh Figueroa RN  Outcome: Ongoing
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
## 2021-04-08 NOTE — PROGRESS NOTES
16.7* 17.6*   HGB 7.6* 8.0*   HCT 24.2* 26.2*   .7 103.1*   * 722*     BMP: [email or web address]  COAGS:   Recent Labs     04/07/21  1842 04/07/21  2320 04/08/21  0349   APTT 82.5* 70.5* 68.7*     PANCREAS:  No results for input(s): LIPASE, AMYLASE in the last 72 hours. LIVER: No results for input(s): AST, ALT, BILIDIR, BILITOT, ALKPHOS in the last 72 hours.   CBC:   Lab Results   Component Value Date    WBC 17.6 04/08/2021    RBC 2.54 04/08/2021    HGB 8.0 04/08/2021    HCT 26.2 04/08/2021    .1 04/08/2021    MCH 31.5 04/08/2021    MCHC 30.5 04/08/2021    RDW 13.6 04/08/2021     04/08/2021    MPV 9.0 04/08/2021     BMP:    Lab Results   Component Value Date     04/02/2021    K 4.4 04/02/2021    CL 99 04/02/2021    CO2 25 04/02/2021    BUN 10 04/02/2021    CREATININE 0.51 04/02/2021    CALCIUM 8.6 04/02/2021    GFRAA >60 04/02/2021    LABGLOM >60 04/02/2021    GLUCOSE 107 04/02/2021       Keiko Landaverde MD  4/8/21, 11:31 AM

## 2021-04-08 NOTE — PROGRESS NOTES
Plastics - [facility]      Patient seen. Dressing changed to evaluate wound. There is further muscle necrosis and desication. Saline dressings weren't started. Granulation tissue only around periphery. No evidence of cellulitis. No drainage. I discussed with him that we will proceed with surgery Friday to debride necrotic tissue to get to viable wound. I do not think the wound will be suitable for grafting yet. Patient voiced understanding. Saline damp dressings now ordered.

## 2021-04-09 ENCOUNTER — ANESTHESIA (OUTPATIENT)
Dept: OPERATING ROOM | Age: 41
DRG: 904 | End: 2021-04-09
Payer: COMMERCIAL

## 2021-04-09 ENCOUNTER — ANESTHESIA EVENT (OUTPATIENT)
Dept: OPERATING ROOM | Age: 41
DRG: 904 | End: 2021-04-09
Payer: COMMERCIAL

## 2021-04-09 VITALS — TEMPERATURE: 97.3 F | SYSTOLIC BLOOD PRESSURE: 89 MMHG | DIASTOLIC BLOOD PRESSURE: 64 MMHG | OXYGEN SATURATION: 100 %

## 2021-04-09 VITALS — OXYGEN SATURATION: 100 % | TEMPERATURE: 97.9 F | DIASTOLIC BLOOD PRESSURE: 77 MMHG | SYSTOLIC BLOOD PRESSURE: 110 MMHG

## 2021-04-09 LAB
ABO/RH: NORMAL
ABSOLUTE EOS #: 0 K/UL (ref 0–0.4)
ABSOLUTE IMMATURE GRANULOCYTE: 0.33 K/UL (ref 0–0.3)
ABSOLUTE LYMPH #: 0.65 K/UL (ref 1–4.8)
ABSOLUTE MONO #: 0.16 K/UL (ref 0.1–0.8)
ANTIBODY SCREEN: NEGATIVE
ARM BAND NUMBER: NORMAL
BASOPHILS # BLD: 0 % (ref 0–2)
BASOPHILS ABSOLUTE: 0 K/UL (ref 0–0.2)
BLOOD BANK SPECIMEN: NORMAL
DIFFERENTIAL TYPE: ABNORMAL
EOSINOPHILS RELATIVE PERCENT: 0 % (ref 1–4)
EXPIRATION DATE: NORMAL
HCT VFR BLD CALC: 26.7 % (ref 40.7–50.3)
HEMOGLOBIN: 7.9 G/DL (ref 13–17)
IMMATURE GRANULOCYTES: 2 %
LYMPHOCYTES # BLD: 4 % (ref 24–44)
MCH RBC QN AUTO: 30 PG (ref 25.2–33.5)
MCHC RBC AUTO-ENTMCNC: 29.6 G/DL (ref 28.4–34.8)
MCV RBC AUTO: 101.5 FL (ref 82.6–102.9)
MONOCYTES # BLD: 1 % (ref 1–7)
MORPHOLOGY: ABNORMAL
MORPHOLOGY: ABNORMAL
MYOGLOBIN: 38 NG/ML (ref 28–72)
NRBC AUTOMATED: 0 PER 100 WBC
PARTIAL THROMBOPLASTIN TIME: 22.5 SEC (ref 20.5–30.5)
PARTIAL THROMBOPLASTIN TIME: 32.1 SEC (ref 20.5–30.5)
PARTIAL THROMBOPLASTIN TIME: 39.2 SEC (ref 20.5–30.5)
PARTIAL THROMBOPLASTIN TIME: 71.4 SEC (ref 20.5–30.5)
PDW BLD-RTO: 13.6 % (ref 11.8–14.4)
PLATELET # BLD: 708 K/UL (ref 138–453)
PLATELET ESTIMATE: ABNORMAL
PMV BLD AUTO: 8.3 FL (ref 8.1–13.5)
RBC # BLD: 2.63 M/UL (ref 4.21–5.77)
RBC # BLD: ABNORMAL 10*6/UL
SEG NEUTROPHILS: 93 % (ref 36–66)
SEGMENTED NEUTROPHILS ABSOLUTE COUNT: 15.16 K/UL (ref 1.8–7.7)
TOTAL CK: 278 U/L (ref 39–308)
WBC # BLD: 16.3 K/UL (ref 3.5–11.3)
WBC # BLD: ABNORMAL 10*3/UL

## 2021-04-09 PROCEDURE — 85730 THROMBOPLASTIN TIME PARTIAL: CPT

## 2021-04-09 PROCEDURE — 2500000003 HC RX 250 WO HCPCS: Performed by: NURSE ANESTHETIST, CERTIFIED REGISTERED

## 2021-04-09 PROCEDURE — 6360000002 HC RX W HCPCS: Performed by: ANESTHESIOLOGY

## 2021-04-09 PROCEDURE — 2580000003 HC RX 258: Performed by: NURSE ANESTHETIST, CERTIFIED REGISTERED

## 2021-04-09 PROCEDURE — 3700000001 HC ADD 15 MINUTES (ANESTHESIA): Performed by: PLASTIC SURGERY

## 2021-04-09 PROCEDURE — 2580000003 HC RX 258: Performed by: STUDENT IN AN ORGANIZED HEALTH CARE EDUCATION/TRAINING PROGRAM

## 2021-04-09 PROCEDURE — 6370000000 HC RX 637 (ALT 250 FOR IP): Performed by: STUDENT IN AN ORGANIZED HEALTH CARE EDUCATION/TRAINING PROGRAM

## 2021-04-09 PROCEDURE — 6360000002 HC RX W HCPCS: Performed by: STUDENT IN AN ORGANIZED HEALTH CARE EDUCATION/TRAINING PROGRAM

## 2021-04-09 PROCEDURE — 86850 RBC ANTIBODY SCREEN: CPT

## 2021-04-09 PROCEDURE — 6370000000 HC RX 637 (ALT 250 FOR IP): Performed by: ANESTHESIOLOGY

## 2021-04-09 PROCEDURE — 3600000014 HC SURGERY LEVEL 4 ADDTL 15MIN: Performed by: PLASTIC SURGERY

## 2021-04-09 PROCEDURE — 7100000001 HC PACU RECOVERY - ADDTL 15 MIN: Performed by: PLASTIC SURGERY

## 2021-04-09 PROCEDURE — 3600000004 HC SURGERY LEVEL 4 BASE: Performed by: PLASTIC SURGERY

## 2021-04-09 PROCEDURE — 6360000002 HC RX W HCPCS: Performed by: PLASTIC SURGERY

## 2021-04-09 PROCEDURE — 86901 BLOOD TYPING SEROLOGIC RH(D): CPT

## 2021-04-09 PROCEDURE — 7100000000 HC PACU RECOVERY - FIRST 15 MIN: Performed by: ORTHOPAEDIC SURGERY

## 2021-04-09 PROCEDURE — 1200000000 HC SEMI PRIVATE

## 2021-04-09 PROCEDURE — 2580000003 HC RX 258: Performed by: NURSE PRACTITIONER

## 2021-04-09 PROCEDURE — 6370000000 HC RX 637 (ALT 250 FOR IP): Performed by: NURSE PRACTITIONER

## 2021-04-09 PROCEDURE — 2580000003 HC RX 258: Performed by: PLASTIC SURGERY

## 2021-04-09 PROCEDURE — 6360000002 HC RX W HCPCS: Performed by: NURSE PRACTITIONER

## 2021-04-09 PROCEDURE — 82550 ASSAY OF CK (CPK): CPT

## 2021-04-09 PROCEDURE — 85025 COMPLETE CBC W/AUTO DIFF WBC: CPT

## 2021-04-09 PROCEDURE — 36415 COLL VENOUS BLD VENIPUNCTURE: CPT

## 2021-04-09 PROCEDURE — 3700000001 HC ADD 15 MINUTES (ANESTHESIA): Performed by: ORTHOPAEDIC SURGERY

## 2021-04-09 PROCEDURE — 86900 BLOOD TYPING SEROLOGIC ABO: CPT

## 2021-04-09 PROCEDURE — 2709999900 HC NON-CHARGEABLE SUPPLY: Performed by: PLASTIC SURGERY

## 2021-04-09 PROCEDURE — 3700000000 HC ANESTHESIA ATTENDED CARE: Performed by: ORTHOPAEDIC SURGERY

## 2021-04-09 PROCEDURE — 2709999900 HC NON-CHARGEABLE SUPPLY: Performed by: ORTHOPAEDIC SURGERY

## 2021-04-09 PROCEDURE — 3600000014 HC SURGERY LEVEL 4 ADDTL 15MIN: Performed by: ORTHOPAEDIC SURGERY

## 2021-04-09 PROCEDURE — 3600000004 HC SURGERY LEVEL 4 BASE: Performed by: ORTHOPAEDIC SURGERY

## 2021-04-09 PROCEDURE — 11043 DBRDMT MUSC&/FSCA 1ST 20/<: CPT | Performed by: ORTHOPAEDIC SURGERY

## 2021-04-09 PROCEDURE — 3700000000 HC ANESTHESIA ATTENDED CARE: Performed by: PLASTIC SURGERY

## 2021-04-09 PROCEDURE — 7100000000 HC PACU RECOVERY - FIRST 15 MIN: Performed by: PLASTIC SURGERY

## 2021-04-09 PROCEDURE — 7100000001 HC PACU RECOVERY - ADDTL 15 MIN: Performed by: ORTHOPAEDIC SURGERY

## 2021-04-09 PROCEDURE — 11046 DBRDMT MUSC&/FSCA EA ADDL: CPT | Performed by: ORTHOPAEDIC SURGERY

## 2021-04-09 PROCEDURE — 0K9B0ZZ DRAINAGE OF LEFT LOWER ARM AND WRIST MUSCLE, OPEN APPROACH: ICD-10-PCS | Performed by: SURGERY

## 2021-04-09 PROCEDURE — 83874 ASSAY OF MYOGLOBIN: CPT

## 2021-04-09 PROCEDURE — 6360000002 HC RX W HCPCS: Performed by: NURSE ANESTHETIST, CERTIFIED REGISTERED

## 2021-04-09 PROCEDURE — 2580000003 HC RX 258: Performed by: ORTHOPAEDIC SURGERY

## 2021-04-09 PROCEDURE — 6360000002 HC RX W HCPCS

## 2021-04-09 RX ORDER — MORPHINE SULFATE 2 MG/ML
2 INJECTION, SOLUTION INTRAMUSCULAR; INTRAVENOUS EVERY 5 MIN PRN
Status: DISCONTINUED | OUTPATIENT
Start: 2021-04-09 | End: 2021-04-09 | Stop reason: HOSPADM

## 2021-04-09 RX ORDER — PROPOFOL 10 MG/ML
INJECTION, EMULSION INTRAVENOUS PRN
Status: DISCONTINUED | OUTPATIENT
Start: 2021-04-09 | End: 2021-04-09 | Stop reason: SDUPTHER

## 2021-04-09 RX ORDER — MIDAZOLAM HYDROCHLORIDE 1 MG/ML
INJECTION INTRAMUSCULAR; INTRAVENOUS PRN
Status: DISCONTINUED | OUTPATIENT
Start: 2021-04-09 | End: 2021-04-09 | Stop reason: SDUPTHER

## 2021-04-09 RX ORDER — LABETALOL HYDROCHLORIDE 5 MG/ML
5 INJECTION, SOLUTION INTRAVENOUS EVERY 10 MIN PRN
Status: DISCONTINUED | OUTPATIENT
Start: 2021-04-09 | End: 2021-04-09 | Stop reason: HOSPADM

## 2021-04-09 RX ORDER — OXYCODONE HYDROCHLORIDE AND ACETAMINOPHEN 5; 325 MG/1; MG/1
2 TABLET ORAL PRN
Status: COMPLETED | OUTPATIENT
Start: 2021-04-09 | End: 2021-04-09

## 2021-04-09 RX ORDER — SODIUM CHLORIDE 9 MG/ML
INJECTION, SOLUTION INTRAVENOUS CONTINUOUS PRN
Status: DISCONTINUED | OUTPATIENT
Start: 2021-04-09 | End: 2021-04-09 | Stop reason: SDUPTHER

## 2021-04-09 RX ORDER — FENTANYL CITRATE 50 UG/ML
INJECTION, SOLUTION INTRAMUSCULAR; INTRAVENOUS PRN
Status: DISCONTINUED | OUTPATIENT
Start: 2021-04-09 | End: 2021-04-09 | Stop reason: SDUPTHER

## 2021-04-09 RX ORDER — MAGNESIUM HYDROXIDE 1200 MG/15ML
LIQUID ORAL CONTINUOUS PRN
Status: COMPLETED | OUTPATIENT
Start: 2021-04-09 | End: 2021-04-09

## 2021-04-09 RX ORDER — LIDOCAINE HYDROCHLORIDE 10 MG/ML
INJECTION, SOLUTION EPIDURAL; INFILTRATION; INTRACAUDAL; PERINEURAL PRN
Status: DISCONTINUED | OUTPATIENT
Start: 2021-04-09 | End: 2021-04-09 | Stop reason: SDUPTHER

## 2021-04-09 RX ORDER — DIPHENHYDRAMINE HYDROCHLORIDE 50 MG/ML
12.5 INJECTION INTRAMUSCULAR; INTRAVENOUS
Status: DISCONTINUED | OUTPATIENT
Start: 2021-04-09 | End: 2021-04-09 | Stop reason: HOSPADM

## 2021-04-09 RX ORDER — FENTANYL CITRATE 50 UG/ML
25 INJECTION, SOLUTION INTRAMUSCULAR; INTRAVENOUS EVERY 5 MIN PRN
Status: DISCONTINUED | OUTPATIENT
Start: 2021-04-09 | End: 2021-04-09 | Stop reason: HOSPADM

## 2021-04-09 RX ORDER — ONDANSETRON 2 MG/ML
4 INJECTION INTRAMUSCULAR; INTRAVENOUS
Status: DISCONTINUED | OUTPATIENT
Start: 2021-04-09 | End: 2021-04-09 | Stop reason: HOSPADM

## 2021-04-09 RX ORDER — OXYCODONE HYDROCHLORIDE AND ACETAMINOPHEN 5; 325 MG/1; MG/1
1 TABLET ORAL PRN
Status: COMPLETED | OUTPATIENT
Start: 2021-04-09 | End: 2021-04-09

## 2021-04-09 RX ORDER — OXYCODONE HYDROCHLORIDE 5 MG/1
5 TABLET ORAL EVERY 4 HOURS PRN
Status: DISCONTINUED | OUTPATIENT
Start: 2021-04-09 | End: 2021-04-11

## 2021-04-09 RX ORDER — DEXAMETHASONE SODIUM PHOSPHATE 4 MG/ML
INJECTION, SOLUTION INTRA-ARTICULAR; INTRALESIONAL; INTRAMUSCULAR; INTRAVENOUS; SOFT TISSUE PRN
Status: DISCONTINUED | OUTPATIENT
Start: 2021-04-09 | End: 2021-04-09 | Stop reason: SDUPTHER

## 2021-04-09 RX ORDER — ONDANSETRON 2 MG/ML
INJECTION INTRAMUSCULAR; INTRAVENOUS PRN
Status: DISCONTINUED | OUTPATIENT
Start: 2021-04-09 | End: 2021-04-09 | Stop reason: SDUPTHER

## 2021-04-09 RX ADMIN — FAMOTIDINE 20 MG: 20 TABLET, FILM COATED ORAL at 21:09

## 2021-04-09 RX ADMIN — LIDOCAINE HYDROCHLORIDE 50 MG: 10 INJECTION, SOLUTION EPIDURAL; INFILTRATION; INTRACAUDAL; PERINEURAL at 07:36

## 2021-04-09 RX ADMIN — GABAPENTIN 600 MG: 300 CAPSULE ORAL at 06:06

## 2021-04-09 RX ADMIN — PIPERACILLIN AND TAZOBACTAM 4500 MG: 4; .5 INJECTION, POWDER, LYOPHILIZED, FOR SOLUTION INTRAVENOUS; PARENTERAL at 06:05

## 2021-04-09 RX ADMIN — FENTANYL CITRATE 50 MCG: 50 INJECTION, SOLUTION INTRAMUSCULAR; INTRAVENOUS at 08:12

## 2021-04-09 RX ADMIN — METHOCARBAMOL TABLETS 750 MG: 750 TABLET, COATED ORAL at 18:25

## 2021-04-09 RX ADMIN — SODIUM CHLORIDE: 9 INJECTION, SOLUTION INTRAVENOUS at 10:13

## 2021-04-09 RX ADMIN — DEXTROSE MONOHYDRATE 2000 MG: 50 INJECTION, SOLUTION INTRAVENOUS at 12:51

## 2021-04-09 RX ADMIN — METHOCARBAMOL TABLETS 750 MG: 750 TABLET, COATED ORAL at 06:06

## 2021-04-09 RX ADMIN — SODIUM CHLORIDE: 9 INJECTION, SOLUTION INTRAVENOUS at 07:31

## 2021-04-09 RX ADMIN — ACETAMINOPHEN 1000 MG: 500 TABLET ORAL at 06:06

## 2021-04-09 RX ADMIN — MORPHINE SULFATE 2 MG: 2 INJECTION, SOLUTION INTRAMUSCULAR; INTRAVENOUS at 08:35

## 2021-04-09 RX ADMIN — HYDROMORPHONE HYDROCHLORIDE 0.5 MG: 1 INJECTION, SOLUTION INTRAMUSCULAR; INTRAVENOUS; SUBCUTANEOUS at 13:40

## 2021-04-09 RX ADMIN — GABAPENTIN 600 MG: 300 CAPSULE ORAL at 14:53

## 2021-04-09 RX ADMIN — LIDOCAINE HYDROCHLORIDE 50 MG: 10 INJECTION, SOLUTION EPIDURAL; INFILTRATION; INTRACAUDAL; PERINEURAL at 12:44

## 2021-04-09 RX ADMIN — ONDANSETRON 4 MG: 2 INJECTION INTRAMUSCULAR; INTRAVENOUS at 08:02

## 2021-04-09 RX ADMIN — OXYCODONE HYDROCHLORIDE 5 MG: 5 TABLET ORAL at 01:07

## 2021-04-09 RX ADMIN — ACETAMINOPHEN 1000 MG: 500 TABLET ORAL at 21:09

## 2021-04-09 RX ADMIN — OXYCODONE HYDROCHLORIDE AND ACETAMINOPHEN 2 TABLET: 5; 325 TABLET ORAL at 08:50

## 2021-04-09 RX ADMIN — PROPOFOL 200 MG: 10 INJECTION, EMULSION INTRAVENOUS at 07:36

## 2021-04-09 RX ADMIN — OXYCODONE HYDROCHLORIDE 5 MG: 5 TABLET ORAL at 17:59

## 2021-04-09 RX ADMIN — HYDROMORPHONE HYDROCHLORIDE 0.5 MG: 1 INJECTION, SOLUTION INTRAMUSCULAR; INTRAVENOUS; SUBCUTANEOUS at 13:45

## 2021-04-09 RX ADMIN — POLYETHYLENE GLYCOL 3350 17 G: 17 POWDER, FOR SOLUTION ORAL at 21:09

## 2021-04-09 RX ADMIN — DEXAMETHASONE SODIUM PHOSPHATE 8 MG: 4 INJECTION, SOLUTION INTRA-ARTICULAR; INTRALESIONAL; INTRAMUSCULAR; INTRAVENOUS; SOFT TISSUE at 07:50

## 2021-04-09 RX ADMIN — PIPERACILLIN AND TAZOBACTAM 4500 MG: 4; .5 INJECTION, POWDER, LYOPHILIZED, FOR SOLUTION INTRAVENOUS; PARENTERAL at 21:09

## 2021-04-09 RX ADMIN — PROPOFOL 200 MG: 10 INJECTION, EMULSION INTRAVENOUS at 12:44

## 2021-04-09 RX ADMIN — MIDAZOLAM HYDROCHLORIDE 2 MG: 1 INJECTION, SOLUTION INTRAMUSCULAR; INTRAVENOUS at 07:36

## 2021-04-09 RX ADMIN — FENTANYL CITRATE 50 MCG: 50 INJECTION, SOLUTION INTRAMUSCULAR; INTRAVENOUS at 07:36

## 2021-04-09 RX ADMIN — OXYCODONE HYDROCHLORIDE 5 MG: 5 TABLET ORAL at 22:10

## 2021-04-09 RX ADMIN — HEPARIN SODIUM 2000 UNITS: 1000 INJECTION INTRAVENOUS; SUBCUTANEOUS at 23:00

## 2021-04-09 RX ADMIN — GABAPENTIN 600 MG: 300 CAPSULE ORAL at 22:09

## 2021-04-09 RX ADMIN — METHOCARBAMOL TABLETS 750 MG: 750 TABLET, COATED ORAL at 22:09

## 2021-04-09 ASSESSMENT — PULMONARY FUNCTION TESTS
PIF_VALUE: 0
PIF_VALUE: 13
PIF_VALUE: 13
PIF_VALUE: 6
PIF_VALUE: 8
PIF_VALUE: 3
PIF_VALUE: 12
PIF_VALUE: 13
PIF_VALUE: 1
PIF_VALUE: 13
PIF_VALUE: 8
PIF_VALUE: 8
PIF_VALUE: 15
PIF_VALUE: 13
PIF_VALUE: 13
PIF_VALUE: 9
PIF_VALUE: 12
PIF_VALUE: 1
PIF_VALUE: 8
PIF_VALUE: 1
PIF_VALUE: 1
PIF_VALUE: 8
PIF_VALUE: 8
PIF_VALUE: 13
PIF_VALUE: 1
PIF_VALUE: 4
PIF_VALUE: 3
PIF_VALUE: 1
PIF_VALUE: 13
PIF_VALUE: 1
PIF_VALUE: 1
PIF_VALUE: 8
PIF_VALUE: 8
PIF_VALUE: 5
PIF_VALUE: 8
PIF_VALUE: 8
PIF_VALUE: 13
PIF_VALUE: 13
PIF_VALUE: 3
PIF_VALUE: 1
PIF_VALUE: 13
PIF_VALUE: 1
PIF_VALUE: 8
PIF_VALUE: 2
PIF_VALUE: 8
PIF_VALUE: 12
PIF_VALUE: 1
PIF_VALUE: 13

## 2021-04-09 ASSESSMENT — PAIN DESCRIPTION - PAIN TYPE
TYPE: SURGICAL PAIN
TYPE: SURGICAL PAIN

## 2021-04-09 ASSESSMENT — PAIN SCALES - GENERAL
PAINLEVEL_OUTOF10: 6
PAINLEVEL_OUTOF10: 10
PAINLEVEL_OUTOF10: 6
PAINLEVEL_OUTOF10: 9
PAINLEVEL_OUTOF10: 9
PAINLEVEL_OUTOF10: 8
PAINLEVEL_OUTOF10: 9
PAINLEVEL_OUTOF10: 7
PAINLEVEL_OUTOF10: 5

## 2021-04-09 ASSESSMENT — PAIN DESCRIPTION - FREQUENCY
FREQUENCY: CONTINUOUS
FREQUENCY: CONTINUOUS

## 2021-04-09 ASSESSMENT — LIFESTYLE VARIABLES
SMOKING_STATUS: 0
SMOKING_STATUS: 0

## 2021-04-09 ASSESSMENT — PAIN DESCRIPTION - ONSET: ONSET: ON-GOING

## 2021-04-09 ASSESSMENT — PAIN DESCRIPTION - LOCATION: LOCATION: ARM

## 2021-04-09 NOTE — PROGRESS NOTES
Present during operation for debridement by Orthopedic surgery. Vascular surgery assistance available but unneeded during debridement. Graft is palpable and hand is not ischemic. No further vascular recommendation. Vascular to sign off.      Electronically signed by Raudel Decker DO on 4/9/2021 at 1:18 PM

## 2021-04-09 NOTE — BRIEF OP NOTE
Brief Postoperative Note      Patient: Nimisha Lambert  YOB: 1980  MRN: [id number]    Date of Procedure: 4/9/2021    Pre-Op Diagnosis: WOUND LEFT FOREARM    Post-Op Diagnosis: Same       Procedure:  Debridement and exploration left forearm wound. Surgeon(s):  Irma Bell MD    Assistant:  Candelaria Jimenez    Anesthesia: General    Estimated Blood Loss (mL): Minimal    Complications: None    Specimens:   * No specimens in log *    Implants:  * No implants in log *      Drains:   [REMOVED] Urethral Catheter Non-latex 16 fr (Removed)   Catheter Indications Perioperative use in selected surgeries including but not limited to urologic, pelvic or need for intraoperative monitoring of urinary output due to prolonged surgery, large volume infusion or need for diuretic therapy in surgery 03/28/21 0800   Site Assessment No urethral drainage 03/28/21 0800   Urine Color Yellow 03/28/21 0800   Urine Appearance Clear 03/28/21 0800   Output (mL) 250 mL 03/28/21 1000   Provider Notified to Remove Yes 03/28/21 0800       Findings: necrotic muscle and fat going deep and distally into wrist area. Refer patient back to Ortho and Vascular.     Electronically signed by Irma Bell MD on 4/9/2021 at 8:09 AM

## 2021-04-09 NOTE — PROGRESS NOTES
[facility]      Patient up and awake. I discussed my findings at surgery with him. That there is still significant necrotic muscle and fat present. It will require further debridement before it is able to be closed. I discussed that that will be done by the ortho and/or vascular surgeons. I will continue to follow to help with closure when we have a healthy wound.
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
## 2021-04-09 NOTE — OP NOTE
wished to proceed with surgical management. Consent was obtained the patient was marked in the preoperative area. Questions were addressed at this time. Patient was taken back to the operative suite he was transferred to the operative table he was placed under general anesthesia without complications. All bony prominences were well-padded. Patient was then prepped and draped in a standard sterile fashion. Timeout was called identifying patient name, surgical site, allergies and procedure. All in attendance were in agreement. At this time the wound was evaluated. Measurements were taken. The wound was found to be 6 x 12 cm. Wound bed was then thoroughly debrided of any necrotic tissue. All necrotic muscle tissue was sharply excised with a #15 blade. Necrotic fascia was also sharply excised with a #15 blade. The wound the wound bed was then scraped with a curette until good bleeding was encountered. Skin edges were cleaned of any necrotic tissue using a #15 blade. The wound at this time was then thoroughly irrigated. Edges of the wound were then reapproximated with 3-0 nylon. At this time the wound bed remained at 11 x 5 cm. The wound was then dressed in wet-to-dry 4 x 4 dressings, Kerlix, and an Ace dressing. Patient was then reversed from anesthesia without complications. He was transferred to PACU under stable conditions. Dr. Suze Camarillo was present and active throughout the entirety of the case. DO Berny      Postoperative instructions: Continue with daily wet-to-dry dressing changes. Wound coverage will be managed by the plastics team.  No further formal debridements planned at this time per orthopedic team.  Please contact Ortho with any questions or concerns.     Electronically signed by DO Berny on 4/9/2021 at 1:35 PM

## 2021-04-09 NOTE — ANESTHESIA POSTPROCEDURE EVALUATION
Department of Anesthesiology  Postprocedure Note    Patient: Pam Lutz  MRN: [id number]  YOB: 1980  Date of evaluation: 4/9/2021  Time:  11:56 AM     Procedure Summary     Date: 04/09/21 Room / Location: [address]    Anesthesia Start: 5045 Anesthesia Stop: 1068    Procedure: LEFT ARM DEBRIDEMENT AND EXPLORATION (Left ) Diagnosis: (WOUND LEFT FOREARM)    Surgeons: Karena Pierre MD Responsible Provider: Dante Pak MD    Anesthesia Type: general ASA Status: 2          Anesthesia Type: general    Zenaida Phase I: Zenaida Score: 10    Zenaida Phase II:      Last vitals: Reviewed and per EMR flowsheets.        Anesthesia Post Evaluation    Patient location during evaluation: PACU  Patient participation: complete - patient participated  Level of consciousness: awake and alert  Pain score: 7 (same as pre)  Nausea & Vomiting: no nausea  Cardiovascular status: hemodynamically stable  Respiratory status: room air
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
## 2021-04-09 NOTE — BRIEF OP NOTE
Brief Postoperative Note      Patient: Kavin King  YOB: 1980  MRN: [id number]    Date of Procedure: 4/9/2021    Pre-Op Diagnosis: Left forearm wound     Post-Op Diagnosis: Same       Procedure:  Left forearm irrigation and debridement of skin, subcutaneous tissues, fascia, and muscle (size 6x12)    Surgeon(s):  MD Ilir Santana MD    Assistant:  Resident: Salvador Lorenzana DO; DO Berny; Wilder Carroll DO    Anesthesia: General    Estimated Blood Loss (mL): Minimal    Complications: None    Specimens:   * No specimens in log *    Implants:  * No implants in log *      Drains:   [REMOVED] Urethral Catheter Non-latex 16 fr (Removed)   Catheter Indications Perioperative use in selected surgeries including but not limited to urologic, pelvic or need for intraoperative monitoring of urinary output due to prolonged surgery, large volume infusion or need for diuretic therapy in surgery 03/28/21 0800   Site Assessment No urethral drainage 03/28/21 0800   Urine Color Yellow 03/28/21 0800   Urine Appearance Clear 03/28/21 0800   Output (mL) 250 mL 03/28/21 1000   Provider Notified to Remove Yes 03/28/21 0800       Findings: Left forearm wound. See op note for details.      Electronically signed by DO Berny on 4/9/2021 at 1:09 PM

## 2021-04-10 LAB
PARTIAL THROMBOPLASTIN TIME: 40.7 SEC (ref 20.5–30.5)
PARTIAL THROMBOPLASTIN TIME: 58.2 SEC (ref 20.5–30.5)
PARTIAL THROMBOPLASTIN TIME: 95.4 SEC (ref 20.5–30.5)

## 2021-04-10 PROCEDURE — 6370000000 HC RX 637 (ALT 250 FOR IP): Performed by: STUDENT IN AN ORGANIZED HEALTH CARE EDUCATION/TRAINING PROGRAM

## 2021-04-10 PROCEDURE — 6360000002 HC RX W HCPCS: Performed by: STUDENT IN AN ORGANIZED HEALTH CARE EDUCATION/TRAINING PROGRAM

## 2021-04-10 PROCEDURE — 1200000000 HC SEMI PRIVATE

## 2021-04-10 PROCEDURE — 2580000003 HC RX 258: Performed by: STUDENT IN AN ORGANIZED HEALTH CARE EDUCATION/TRAINING PROGRAM

## 2021-04-10 PROCEDURE — 36415 COLL VENOUS BLD VENIPUNCTURE: CPT

## 2021-04-10 PROCEDURE — 85730 THROMBOPLASTIN TIME PARTIAL: CPT

## 2021-04-10 RX ORDER — BACITRACIN, NEOMYCIN, POLYMYXIN B 400; 3.5; 5 [USP'U]/G; MG/G; [USP'U]/G
OINTMENT TOPICAL 2 TIMES DAILY
Status: DISCONTINUED | OUTPATIENT
Start: 2021-04-10 | End: 2021-04-15

## 2021-04-10 RX ORDER — KETOROLAC TROMETHAMINE 30 MG/ML
30 INJECTION, SOLUTION INTRAMUSCULAR; INTRAVENOUS ONCE
Status: COMPLETED | OUTPATIENT
Start: 2021-04-10 | End: 2021-04-10

## 2021-04-10 RX ADMIN — FAMOTIDINE 20 MG: 20 TABLET, FILM COATED ORAL at 08:15

## 2021-04-10 RX ADMIN — OXYCODONE HYDROCHLORIDE 5 MG: 5 TABLET ORAL at 16:39

## 2021-04-10 RX ADMIN — KETOROLAC TROMETHAMINE 30 MG: 30 INJECTION, SOLUTION INTRAMUSCULAR; INTRAVENOUS at 08:15

## 2021-04-10 RX ADMIN — OXYCODONE HYDROCHLORIDE 5 MG: 5 TABLET ORAL at 11:41

## 2021-04-10 RX ADMIN — METHOCARBAMOL TABLETS 750 MG: 750 TABLET, COATED ORAL at 05:35

## 2021-04-10 RX ADMIN — FAMOTIDINE 20 MG: 20 TABLET, FILM COATED ORAL at 20:49

## 2021-04-10 RX ADMIN — HEPARIN SODIUM AND DEXTROSE 37.34 UNITS/KG/HR: 10000; 5 INJECTION INTRAVENOUS at 15:12

## 2021-04-10 RX ADMIN — METHOCARBAMOL TABLETS 750 MG: 750 TABLET, COATED ORAL at 11:41

## 2021-04-10 RX ADMIN — ACETAMINOPHEN 1000 MG: 500 TABLET ORAL at 21:45

## 2021-04-10 RX ADMIN — ACETAMINOPHEN 1000 MG: 500 TABLET ORAL at 05:34

## 2021-04-10 RX ADMIN — GABAPENTIN 600 MG: 300 CAPSULE ORAL at 05:34

## 2021-04-10 RX ADMIN — ACETAMINOPHEN 1000 MG: 500 TABLET ORAL at 15:09

## 2021-04-10 RX ADMIN — OXYCODONE HYDROCHLORIDE 5 MG: 5 TABLET ORAL at 20:49

## 2021-04-10 RX ADMIN — HEPARIN SODIUM 2000 UNITS: 1000 INJECTION INTRAVENOUS; SUBCUTANEOUS at 15:17

## 2021-04-10 RX ADMIN — POLYMYXIN B SULFATE, BACITRACIN ZINC, NEOMYCIN SULFATE: 5000; 3.5; 4 OINTMENT TOPICAL at 12:16

## 2021-04-10 RX ADMIN — OXYCODONE HYDROCHLORIDE 5 MG: 5 TABLET ORAL at 06:40

## 2021-04-10 RX ADMIN — HEPARIN SODIUM AND DEXTROSE 38.34 UNITS/KG/HR: 10000; 5 INJECTION INTRAVENOUS at 02:22

## 2021-04-10 RX ADMIN — PIPERACILLIN AND TAZOBACTAM 4500 MG: 4; .5 INJECTION, POWDER, LYOPHILIZED, FOR SOLUTION INTRAVENOUS; PARENTERAL at 20:50

## 2021-04-10 RX ADMIN — PIPERACILLIN AND TAZOBACTAM 4500 MG: 4; .5 INJECTION, POWDER, LYOPHILIZED, FOR SOLUTION INTRAVENOUS; PARENTERAL at 12:17

## 2021-04-10 RX ADMIN — PIPERACILLIN AND TAZOBACTAM 4500 MG: 4; .5 INJECTION, POWDER, LYOPHILIZED, FOR SOLUTION INTRAVENOUS; PARENTERAL at 05:35

## 2021-04-10 RX ADMIN — HYDROXYCHLOROQUINE SULFATE 200 MG: 200 TABLET, FILM COATED ORAL at 08:15

## 2021-04-10 RX ADMIN — OXYCODONE HYDROCHLORIDE 5 MG: 5 TABLET ORAL at 02:21

## 2021-04-10 RX ADMIN — GABAPENTIN 600 MG: 300 CAPSULE ORAL at 15:10

## 2021-04-10 RX ADMIN — METHOCARBAMOL TABLETS 750 MG: 750 TABLET, COATED ORAL at 17:42

## 2021-04-10 ASSESSMENT — PAIN SCALES - GENERAL
PAINLEVEL_OUTOF10: 7
PAINLEVEL_OUTOF10: 7
PAINLEVEL_OUTOF10: 9
PAINLEVEL_OUTOF10: 5
PAINLEVEL_OUTOF10: 8
PAINLEVEL_OUTOF10: 6
PAINLEVEL_OUTOF10: 8
PAINLEVEL_OUTOF10: 8

## 2021-04-10 ASSESSMENT — PAIN DESCRIPTION - ONSET
ONSET: ON-GOING
ONSET: ON-GOING

## 2021-04-10 ASSESSMENT — PAIN DESCRIPTION - DESCRIPTORS
DESCRIPTORS: ACHING;DISCOMFORT
DESCRIPTORS: ACHING;DISCOMFORT;SORE;CONSTANT

## 2021-04-10 ASSESSMENT — PAIN DESCRIPTION - FREQUENCY: FREQUENCY: CONTINUOUS

## 2021-04-10 ASSESSMENT — PAIN - FUNCTIONAL ASSESSMENT
PAIN_FUNCTIONAL_ASSESSMENT: ACTIVITIES ARE NOT PREVENTED
PAIN_FUNCTIONAL_ASSESSMENT: ACTIVITIES ARE NOT PREVENTED

## 2021-04-10 ASSESSMENT — PAIN DESCRIPTION - LOCATION
LOCATION: ARM
LOCATION: ARM

## 2021-04-10 ASSESSMENT — PAIN DESCRIPTION - PAIN TYPE: TYPE: SURGICAL PAIN

## 2021-04-10 ASSESSMENT — PAIN DESCRIPTION - PROGRESSION
CLINICAL_PROGRESSION: NOT CHANGED
CLINICAL_PROGRESSION: NOT CHANGED

## 2021-04-10 ASSESSMENT — PAIN DESCRIPTION - ORIENTATION: ORIENTATION: LEFT

## 2021-04-10 NOTE — PROGRESS NOTES
Was informed per Dr. Jose Angel Badillo w/ ortho, wet-dry dressings are to be performed once a shift. Informed any other wound management will be deferred to plastics as they will perform definitive closure.
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
## 2021-04-10 NOTE — PROCEDURES
PROCEDURE NOTE - SUTURE/STAPLE REMOVAL    PATIENT NAME: Jacklyn  [facility] RECORD NO. [id number]  DATE: 4/10/2021  ATTENDING PHYSICIAN: Dr. Latonia Brannon DIAGNOSIS: Wound healed  POSTOPERATIVE DIAGNOSIS:  Same  PROCEDURE PERFORMED:   Suture removal  PERFORMING PHYSICIAN: Yen Hernandez DO      DISCUSSION:  Philomena Andino is a 47y.o.-year-old male who requires suture removal due to Wound healed. The history and physical examination were reviewed and confirmed. CONSENT: The patient provided verbal consent for this procedure. PROCEDURE:  The patient was placed in the appropriate position and 2 sutures were removed without difficulty. The wound appears well healed with no surrounding erythema, edema, fluctuance or drainage. Wound edges are well aproximated    The patient tolerated the procedure well.      COMPLICATIONS:   None     Yen Hernandez DO  10:50 AM, 4/10/21

## 2021-04-11 LAB
PARTIAL THROMBOPLASTIN TIME: 52.1 SEC (ref 20.5–30.5)
PARTIAL THROMBOPLASTIN TIME: 53.2 SEC (ref 20.5–30.5)
PARTIAL THROMBOPLASTIN TIME: 78.2 SEC (ref 20.5–30.5)

## 2021-04-11 PROCEDURE — 85730 THROMBOPLASTIN TIME PARTIAL: CPT

## 2021-04-11 PROCEDURE — 6370000000 HC RX 637 (ALT 250 FOR IP): Performed by: STUDENT IN AN ORGANIZED HEALTH CARE EDUCATION/TRAINING PROGRAM

## 2021-04-11 PROCEDURE — 6360000002 HC RX W HCPCS: Performed by: STUDENT IN AN ORGANIZED HEALTH CARE EDUCATION/TRAINING PROGRAM

## 2021-04-11 PROCEDURE — 2580000003 HC RX 258: Performed by: STUDENT IN AN ORGANIZED HEALTH CARE EDUCATION/TRAINING PROGRAM

## 2021-04-11 PROCEDURE — 1200000000 HC SEMI PRIVATE

## 2021-04-11 PROCEDURE — 36415 COLL VENOUS BLD VENIPUNCTURE: CPT

## 2021-04-11 RX ORDER — OXYCODONE HYDROCHLORIDE 5 MG/1
5 TABLET ORAL EVERY 6 HOURS PRN
Status: DISCONTINUED | OUTPATIENT
Start: 2021-04-11 | End: 2021-04-19

## 2021-04-11 RX ORDER — LANOLIN ALCOHOL/MO/W.PET/CERES
6 CREAM (GRAM) TOPICAL NIGHTLY PRN
Status: DISCONTINUED | OUTPATIENT
Start: 2021-04-11 | End: 2021-04-14

## 2021-04-11 RX ORDER — KETOROLAC TROMETHAMINE 30 MG/ML
30 INJECTION, SOLUTION INTRAMUSCULAR; INTRAVENOUS ONCE
Status: COMPLETED | OUTPATIENT
Start: 2021-04-11 | End: 2021-04-11

## 2021-04-11 RX ORDER — GABAPENTIN 300 MG/1
900 CAPSULE ORAL EVERY 8 HOURS SCHEDULED
Status: DISCONTINUED | OUTPATIENT
Start: 2021-04-11 | End: 2021-04-14

## 2021-04-11 RX ADMIN — METHOCARBAMOL TABLETS 750 MG: 750 TABLET, COATED ORAL at 11:52

## 2021-04-11 RX ADMIN — PIPERACILLIN AND TAZOBACTAM 4500 MG: 4; .5 INJECTION, POWDER, LYOPHILIZED, FOR SOLUTION INTRAVENOUS; PARENTERAL at 12:34

## 2021-04-11 RX ADMIN — HEPARIN SODIUM AND DEXTROSE 37.34 UNITS/KG/HR: 10000; 5 INJECTION INTRAVENOUS at 02:04

## 2021-04-11 RX ADMIN — PIPERACILLIN AND TAZOBACTAM 4500 MG: 4; .5 INJECTION, POWDER, LYOPHILIZED, FOR SOLUTION INTRAVENOUS; PARENTERAL at 05:24

## 2021-04-11 RX ADMIN — PIPERACILLIN AND TAZOBACTAM 4500 MG: 4; .5 INJECTION, POWDER, LYOPHILIZED, FOR SOLUTION INTRAVENOUS; PARENTERAL at 21:03

## 2021-04-11 RX ADMIN — OXYCODONE HYDROCHLORIDE 5 MG: 5 TABLET ORAL at 05:27

## 2021-04-11 RX ADMIN — FAMOTIDINE 20 MG: 20 TABLET, FILM COATED ORAL at 08:35

## 2021-04-11 RX ADMIN — GABAPENTIN 600 MG: 300 CAPSULE ORAL at 00:00

## 2021-04-11 RX ADMIN — METHOCARBAMOL TABLETS 750 MG: 750 TABLET, COATED ORAL at 23:58

## 2021-04-11 RX ADMIN — ACETAMINOPHEN 1000 MG: 500 TABLET ORAL at 14:32

## 2021-04-11 RX ADMIN — OXYCODONE HYDROCHLORIDE 5 MG: 5 TABLET ORAL at 11:52

## 2021-04-11 RX ADMIN — GABAPENTIN 900 MG: 300 CAPSULE ORAL at 14:32

## 2021-04-11 RX ADMIN — OXYCODONE HYDROCHLORIDE 5 MG: 5 TABLET ORAL at 18:18

## 2021-04-11 RX ADMIN — METHOCARBAMOL TABLETS 750 MG: 750 TABLET, COATED ORAL at 18:18

## 2021-04-11 RX ADMIN — POLYMYXIN B SULFATE, BACITRACIN ZINC, NEOMYCIN SULFATE: 5000; 3.5; 4 OINTMENT TOPICAL at 21:03

## 2021-04-11 RX ADMIN — GABAPENTIN 600 MG: 300 CAPSULE ORAL at 07:25

## 2021-04-11 RX ADMIN — HEPARIN SODIUM AND DEXTROSE 35.34 UNITS/KG/HR: 10000; 5 INJECTION INTRAVENOUS at 12:34

## 2021-04-11 RX ADMIN — ACETAMINOPHEN 1000 MG: 500 TABLET ORAL at 05:27

## 2021-04-11 RX ADMIN — KETOROLAC TROMETHAMINE 30 MG: 30 INJECTION, SOLUTION INTRAMUSCULAR; INTRAVENOUS at 03:15

## 2021-04-11 RX ADMIN — HYDROXYCHLOROQUINE SULFATE 200 MG: 200 TABLET, FILM COATED ORAL at 08:35

## 2021-04-11 RX ADMIN — FAMOTIDINE 20 MG: 20 TABLET, FILM COATED ORAL at 21:03

## 2021-04-11 RX ADMIN — GABAPENTIN 900 MG: 300 CAPSULE ORAL at 23:58

## 2021-04-11 RX ADMIN — OXYCODONE HYDROCHLORIDE 5 MG: 5 TABLET ORAL at 00:46

## 2021-04-11 RX ADMIN — METHOCARBAMOL TABLETS 750 MG: 750 TABLET, COATED ORAL at 00:00

## 2021-04-11 RX ADMIN — METHOCARBAMOL TABLETS 750 MG: 750 TABLET, COATED ORAL at 05:27

## 2021-04-11 RX ADMIN — ACETAMINOPHEN 1000 MG: 500 TABLET ORAL at 23:58

## 2021-04-11 ASSESSMENT — PAIN DESCRIPTION - DESCRIPTORS
DESCRIPTORS: CONSTANT;DISCOMFORT
DESCRIPTORS: CONSTANT;RADIATING;SHOOTING
DESCRIPTORS: DISCOMFORT;SHOOTING
DESCRIPTORS: DISCOMFORT;SHOOTING
DESCRIPTORS: CONSTANT;DISCOMFORT

## 2021-04-11 ASSESSMENT — PAIN DESCRIPTION - ORIENTATION
ORIENTATION: LEFT;LOWER
ORIENTATION: LEFT;LOWER

## 2021-04-11 ASSESSMENT — PAIN DESCRIPTION - LOCATION
LOCATION: ARM

## 2021-04-11 ASSESSMENT — PAIN DESCRIPTION - ONSET
ONSET: ON-GOING
ONSET: AWAKENED FROM SLEEP
ONSET: ON-GOING

## 2021-04-11 ASSESSMENT — PAIN - FUNCTIONAL ASSESSMENT: PAIN_FUNCTIONAL_ASSESSMENT: ACTIVITIES ARE NOT PREVENTED

## 2021-04-11 ASSESSMENT — PAIN SCALES - GENERAL
PAINLEVEL_OUTOF10: 8
PAINLEVEL_OUTOF10: 9
PAINLEVEL_OUTOF10: 7
PAINLEVEL_OUTOF10: 0

## 2021-04-11 ASSESSMENT — PAIN DESCRIPTION - PAIN TYPE
TYPE: SURGICAL PAIN

## 2021-04-11 ASSESSMENT — PAIN DESCRIPTION - PROGRESSION
CLINICAL_PROGRESSION: NOT CHANGED
CLINICAL_PROGRESSION: NOT CHANGED

## 2021-04-11 ASSESSMENT — PAIN DESCRIPTION - FREQUENCY
FREQUENCY: CONTINUOUS

## 2021-04-11 NOTE — PROGRESS NOTES
Notified Dr. Francisco Quintana that pt iis still consistently rating pain 7-8 and says he feels increase shooting pain down his arm. Pt also stated he has been having difficulty sleep. Writer requested adjustment in pain regimen and requested medication to help pt receive sleep. New orders placed. See MAR.

## 2021-04-11 NOTE — ANESTHESIA POSTPROCEDURE EVALUATION
Department of Anesthesiology  Postprocedure Note    Patient: Ketan Young  MRN: [id number]  YOB: 1980  Date of evaluation: 4/11/2021  Time:  7:05 PM     Procedure Summary     Date: 04/09/21 Room / Location: [address]    Anesthesia Start: 1237 Anesthesia Stop: 1330    Procedure: IRRIGATION AND DEBRIDEMENT LEFT FOREARM (Left Arm Lower) Diagnosis: (WOUNDS)    Surgeons: Zheng Garcia MD Responsible Provider: Maxim Duran MD    Anesthesia Type: general ASA Status: 2          Anesthesia Type: general    Zenaida Phase I: Zenaida Score: 10    Zenaida Phase II:      Last vitals: Reviewed and per EMR flowsheets.        Anesthesia Post Evaluation    Patient location during evaluation: PACU  Patient participation: complete - patient participated  Level of consciousness: awake and alert  Pain score: 5  Airway patency: patent  Nausea & Vomiting: no nausea and no vomiting  Complications: no  Cardiovascular status: hemodynamically stable  Respiratory status: room air  Hydration status: euvolemic
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
## 2021-04-11 NOTE — CARE COORDINATION
Transitional Planning    Spoke to patient about discharge plan. Plan is to [facility] when medically stable. Another surgery expected, no date yet.

## 2021-04-11 NOTE — PROGRESS NOTES
Old dressing was removed from L forearm. Wound appeared to have purulent drainage. All pulses were strong with doppler and radial was palpable. Using sterile technique writer gently blotted purulent drainage from wound bed. Writer then applied damp saline 4x4 guaze to bed of wound and along forearm. Writer then applied ABD, followed by guaze roll and secured with ACE. Pt tolerated dressing well. Writer notified team of concerns related to drainage and wrist drop. Informed they will be down to assess. 1140: Dr. Alisson Mayfield at bedside and informed writer to notify team when next dressing is due as they will perform dressing. Also verbally ordered to place OT eval for possible new splint.

## 2021-04-12 LAB
ABSOLUTE EOS #: 0.25 K/UL (ref 0–0.44)
ABSOLUTE IMMATURE GRANULOCYTE: 0.24 K/UL (ref 0–0.3)
ABSOLUTE LYMPH #: 2.3 K/UL (ref 1.1–3.7)
ABSOLUTE MONO #: 0.92 K/UL (ref 0.1–1.2)
ANION GAP SERPL CALCULATED.3IONS-SCNC: 10 MMOL/L (ref 9–17)
BASOPHILS # BLD: 0 % (ref 0–2)
BASOPHILS ABSOLUTE: 0.04 K/UL (ref 0–0.2)
BUN BLDV-MCNC: 10 MG/DL (ref 6–20)
BUN/CREAT BLD: ABNORMAL (ref 9–20)
CALCIUM SERPL-MCNC: 9 MG/DL (ref 8.6–10.4)
CHLORIDE BLD-SCNC: 104 MMOL/L (ref 98–107)
CO2: 26 MMOL/L (ref 20–31)
CREAT SERPL-MCNC: 0.64 MG/DL (ref 0.7–1.2)
DIFFERENTIAL TYPE: ABNORMAL
EOSINOPHILS RELATIVE PERCENT: 2 % (ref 1–4)
GFR AFRICAN AMERICAN: >60 ML/MIN
GFR NON-AFRICAN AMERICAN: >60 ML/MIN
GFR SERPL CREATININE-BSD FRML MDRD: ABNORMAL ML/MIN/{1.73_M2}
GFR SERPL CREATININE-BSD FRML MDRD: ABNORMAL ML/MIN/{1.73_M2}
GLUCOSE BLD-MCNC: 102 MG/DL (ref 70–99)
HCT VFR BLD CALC: 25.6 % (ref 40.7–50.3)
HEMOGLOBIN: 7.6 G/DL (ref 13–17)
IMMATURE GRANULOCYTES: 2 %
LYMPHOCYTES # BLD: 21 % (ref 24–43)
MCH RBC QN AUTO: 30.2 PG (ref 25.2–33.5)
MCHC RBC AUTO-ENTMCNC: 29.7 G/DL (ref 28.4–34.8)
MCV RBC AUTO: 101.6 FL (ref 82.6–102.9)
MONOCYTES # BLD: 9 % (ref 3–12)
NRBC AUTOMATED: 0.2 PER 100 WBC
PARTIAL THROMBOPLASTIN TIME: 46.4 SEC (ref 20.5–30.5)
PARTIAL THROMBOPLASTIN TIME: 54.4 SEC (ref 20.5–30.5)
PARTIAL THROMBOPLASTIN TIME: 64.7 SEC (ref 20.5–30.5)
PARTIAL THROMBOPLASTIN TIME: 76.7 SEC (ref 20.5–30.5)
PDW BLD-RTO: 13.9 % (ref 11.8–14.4)
PLATELET # BLD: 694 K/UL (ref 138–453)
PLATELET ESTIMATE: ABNORMAL
PMV BLD AUTO: 8.2 FL (ref 8.1–13.5)
POTASSIUM SERPL-SCNC: 4.4 MMOL/L (ref 3.7–5.3)
RBC # BLD: 2.52 M/UL (ref 4.21–5.77)
RBC # BLD: ABNORMAL 10*6/UL
SEG NEUTROPHILS: 65 % (ref 36–65)
SEGMENTED NEUTROPHILS ABSOLUTE COUNT: 7.12 K/UL (ref 1.5–8.1)
SODIUM BLD-SCNC: 140 MMOL/L (ref 135–144)
WBC # BLD: 10.9 K/UL (ref 3.5–11.3)
WBC # BLD: ABNORMAL 10*3/UL

## 2021-04-12 PROCEDURE — 2580000003 HC RX 258: Performed by: STUDENT IN AN ORGANIZED HEALTH CARE EDUCATION/TRAINING PROGRAM

## 2021-04-12 PROCEDURE — 36415 COLL VENOUS BLD VENIPUNCTURE: CPT

## 2021-04-12 PROCEDURE — 6360000002 HC RX W HCPCS: Performed by: STUDENT IN AN ORGANIZED HEALTH CARE EDUCATION/TRAINING PROGRAM

## 2021-04-12 PROCEDURE — 6370000000 HC RX 637 (ALT 250 FOR IP): Performed by: STUDENT IN AN ORGANIZED HEALTH CARE EDUCATION/TRAINING PROGRAM

## 2021-04-12 PROCEDURE — 97530 THERAPEUTIC ACTIVITIES: CPT

## 2021-04-12 PROCEDURE — 80048 BASIC METABOLIC PNL TOTAL CA: CPT

## 2021-04-12 PROCEDURE — 1200000000 HC SEMI PRIVATE

## 2021-04-12 PROCEDURE — 85730 THROMBOPLASTIN TIME PARTIAL: CPT

## 2021-04-12 PROCEDURE — 85025 COMPLETE CBC W/AUTO DIFF WBC: CPT

## 2021-04-12 RX ADMIN — OXYCODONE HYDROCHLORIDE 5 MG: 5 TABLET ORAL at 00:38

## 2021-04-12 RX ADMIN — METHOCARBAMOL TABLETS 750 MG: 750 TABLET, COATED ORAL at 22:09

## 2021-04-12 RX ADMIN — GABAPENTIN 900 MG: 300 CAPSULE ORAL at 14:57

## 2021-04-12 RX ADMIN — FAMOTIDINE 20 MG: 20 TABLET, FILM COATED ORAL at 20:52

## 2021-04-12 RX ADMIN — METHOCARBAMOL TABLETS 750 MG: 750 TABLET, COATED ORAL at 17:25

## 2021-04-12 RX ADMIN — PIPERACILLIN AND TAZOBACTAM 4500 MG: 4; .5 INJECTION, POWDER, LYOPHILIZED, FOR SOLUTION INTRAVENOUS; PARENTERAL at 20:52

## 2021-04-12 RX ADMIN — Medication 6 MG: at 00:38

## 2021-04-12 RX ADMIN — FAMOTIDINE 20 MG: 20 TABLET, FILM COATED ORAL at 08:57

## 2021-04-12 RX ADMIN — OXYCODONE HYDROCHLORIDE 5 MG: 5 TABLET ORAL at 22:09

## 2021-04-12 RX ADMIN — ACETAMINOPHEN 1000 MG: 500 TABLET ORAL at 06:31

## 2021-04-12 RX ADMIN — OXYCODONE HYDROCHLORIDE 5 MG: 5 TABLET ORAL at 16:06

## 2021-04-12 RX ADMIN — HEPARIN SODIUM AND DEXTROSE 35.34 UNITS/KG/HR: 10000; 5 INJECTION INTRAVENOUS at 00:57

## 2021-04-12 RX ADMIN — ACETAMINOPHEN 1000 MG: 500 TABLET ORAL at 22:09

## 2021-04-12 RX ADMIN — OXYCODONE HYDROCHLORIDE 5 MG: 5 TABLET ORAL at 08:58

## 2021-04-12 RX ADMIN — ACETAMINOPHEN 1000 MG: 500 TABLET ORAL at 14:57

## 2021-04-12 RX ADMIN — HYDROXYCHLOROQUINE SULFATE 200 MG: 200 TABLET, FILM COATED ORAL at 08:58

## 2021-04-12 RX ADMIN — METHOCARBAMOL TABLETS 750 MG: 750 TABLET, COATED ORAL at 06:31

## 2021-04-12 RX ADMIN — PIPERACILLIN AND TAZOBACTAM 4500 MG: 4; .5 INJECTION, POWDER, LYOPHILIZED, FOR SOLUTION INTRAVENOUS; PARENTERAL at 05:05

## 2021-04-12 RX ADMIN — METHOCARBAMOL TABLETS 750 MG: 750 TABLET, COATED ORAL at 12:53

## 2021-04-12 RX ADMIN — Medication 6 MG: at 22:09

## 2021-04-12 RX ADMIN — HEPARIN SODIUM AND DEXTROSE 33.34 UNITS/KG/HR: 10000; 5 INJECTION INTRAVENOUS at 11:56

## 2021-04-12 RX ADMIN — GABAPENTIN 900 MG: 300 CAPSULE ORAL at 06:31

## 2021-04-12 RX ADMIN — GABAPENTIN 900 MG: 300 CAPSULE ORAL at 22:09

## 2021-04-12 RX ADMIN — PIPERACILLIN AND TAZOBACTAM 4500 MG: 4; .5 INJECTION, POWDER, LYOPHILIZED, FOR SOLUTION INTRAVENOUS; PARENTERAL at 12:53

## 2021-04-12 RX ADMIN — HEPARIN SODIUM 2000 UNITS: 1000 INJECTION INTRAVENOUS; SUBCUTANEOUS at 22:44

## 2021-04-12 ASSESSMENT — PAIN DESCRIPTION - PAIN TYPE
TYPE: SURGICAL PAIN
TYPE: SURGICAL PAIN

## 2021-04-12 ASSESSMENT — PAIN - FUNCTIONAL ASSESSMENT: PAIN_FUNCTIONAL_ASSESSMENT: ACTIVITIES ARE NOT PREVENTED

## 2021-04-12 ASSESSMENT — PAIN DESCRIPTION - FREQUENCY: FREQUENCY: CONTINUOUS

## 2021-04-12 ASSESSMENT — PAIN DESCRIPTION - LOCATION: LOCATION: ARM

## 2021-04-12 ASSESSMENT — PAIN SCALES - GENERAL
PAINLEVEL_OUTOF10: 8

## 2021-04-12 ASSESSMENT — PAIN DESCRIPTION - ORIENTATION
ORIENTATION: LEFT
ORIENTATION: LEFT

## 2021-04-12 ASSESSMENT — PAIN DESCRIPTION - ONSET
ONSET: ON-GOING
ONSET: ON-GOING

## 2021-04-12 ASSESSMENT — PAIN DESCRIPTION - DESCRIPTORS: DESCRIPTORS: CONSTANT;DISCOMFORT

## 2021-04-12 ASSESSMENT — PAIN DESCRIPTION - DIRECTION: RADIATING_TOWARDS: L FOREARM
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
## 2021-04-12 NOTE — PROGRESS NOTES
Writer informed Dr. Adriano Fitzpatrick regarding pts concerns of darkening tendon and plans for surgery. Message left for Melissa Mcneill nurse. Was informed that Dr. Bhatia  would be in surgery all day.

## 2021-04-12 NOTE — PLAN OF CARE
Problem: Pain:  Goal: Pain level will decrease  Description: Pain level will decrease  4/12/2021 0431 by Marcy Noel RN  Outcome: Ongoing  4/11/2021 1854 by Tanna Robert RN  Outcome: Ongoing  Goal: Control of acute pain  Description: Control of acute pain  4/12/2021 0431 by Marcy Noel RN  Outcome: Ongoing  4/11/2021 1854 by Tanna Robert RN  Outcome: Ongoing  Goal: Control of chronic pain  Description: Control of chronic pain  4/12/2021 0431 by Marcy Noel RN  Outcome: Ongoing  4/11/2021 1854 by Tanna Robert RN  Outcome: Ongoing     Problem: Nutrition  Goal: Optimal nutrition therapy  4/12/2021 0431 by Marcy Noel RN  Outcome: Ongoing  4/11/2021 1854 by Tanna Robert RN  Outcome: Ongoing     Problem: Skin Integrity:  Goal: Will show no infection signs and symptoms  Description: Will show no infection signs and symptoms  4/12/2021 0431 by Marcy Noel RN  Outcome: Ongoing  4/11/2021 1854 by Tanna Robert RN  Outcome: Ongoing  Goal: Absence of new skin breakdown  Description: Absence of new skin breakdown  4/12/2021 0431 by Marcy Noel RN  Outcome: Ongoing  4/11/2021 1854 by Tanna Robert RN  Outcome: Ongoing     Problem: Suicide risk  Goal: Provide patient with safe environment  Description: Provide patient with safe environment  4/12/2021 0431 by Marcy Noel RN  Outcome: Ongoing  4/11/2021 1854 by Tanna Robert RN  Outcome: Ongoing
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
## 2021-04-12 NOTE — PROGRESS NOTES
16.3* 10.9   HGB 7.9* 7.6*   HCT 26.7* 25.6*   .5 101.6   * 694*     BMP:   Recent Labs     04/12/21  0545      K 4.4      CO2 26   BUN 10   CREATININE 0.64*   GLUCOSE 102*     COAGS:   Recent Labs     04/11/21  1809 04/12/21  0013 04/12/21  0545   APTT 53.2* 64.7* 76.7*       RADIOLOGY:  No results found.       Krish Mane, DO

## 2021-04-13 LAB
PARTIAL THROMBOPLASTIN TIME: 46.8 SEC (ref 20.5–30.5)
PARTIAL THROMBOPLASTIN TIME: 60.9 SEC (ref 20.5–30.5)
PARTIAL THROMBOPLASTIN TIME: 71.3 SEC (ref 20.5–30.5)

## 2021-04-13 PROCEDURE — 6360000002 HC RX W HCPCS: Performed by: STUDENT IN AN ORGANIZED HEALTH CARE EDUCATION/TRAINING PROGRAM

## 2021-04-13 PROCEDURE — 6370000000 HC RX 637 (ALT 250 FOR IP): Performed by: STUDENT IN AN ORGANIZED HEALTH CARE EDUCATION/TRAINING PROGRAM

## 2021-04-13 PROCEDURE — 2580000003 HC RX 258: Performed by: STUDENT IN AN ORGANIZED HEALTH CARE EDUCATION/TRAINING PROGRAM

## 2021-04-13 PROCEDURE — 36415 COLL VENOUS BLD VENIPUNCTURE: CPT

## 2021-04-13 PROCEDURE — 1200000000 HC SEMI PRIVATE

## 2021-04-13 PROCEDURE — 85730 THROMBOPLASTIN TIME PARTIAL: CPT

## 2021-04-13 RX ORDER — LIDOCAINE 4 G/G
1 PATCH TOPICAL ONCE
Status: COMPLETED | OUTPATIENT
Start: 2021-04-13 | End: 2021-04-14

## 2021-04-13 RX ADMIN — METHOCARBAMOL TABLETS 750 MG: 750 TABLET, COATED ORAL at 13:53

## 2021-04-13 RX ADMIN — OXYCODONE HYDROCHLORIDE 5 MG: 5 TABLET ORAL at 11:24

## 2021-04-13 RX ADMIN — Medication 6 MG: at 23:02

## 2021-04-13 RX ADMIN — PIPERACILLIN AND TAZOBACTAM 4500 MG: 4; .5 INJECTION, POWDER, LYOPHILIZED, FOR SOLUTION INTRAVENOUS; PARENTERAL at 20:54

## 2021-04-13 RX ADMIN — HEPARIN SODIUM 2000 UNITS: 1000 INJECTION INTRAVENOUS; SUBCUTANEOUS at 13:54

## 2021-04-13 RX ADMIN — ACETAMINOPHEN 1000 MG: 500 TABLET ORAL at 13:53

## 2021-04-13 RX ADMIN — METHOCARBAMOL TABLETS 750 MG: 750 TABLET, COATED ORAL at 05:24

## 2021-04-13 RX ADMIN — PIPERACILLIN AND TAZOBACTAM 4500 MG: 4; .5 INJECTION, POWDER, LYOPHILIZED, FOR SOLUTION INTRAVENOUS; PARENTERAL at 05:24

## 2021-04-13 RX ADMIN — ACETAMINOPHEN 1000 MG: 500 TABLET ORAL at 05:24

## 2021-04-13 RX ADMIN — HEPARIN SODIUM AND DEXTROSE 33.34 UNITS/KG/HR: 10000; 5 INJECTION INTRAVENOUS at 11:25

## 2021-04-13 RX ADMIN — GABAPENTIN 900 MG: 300 CAPSULE ORAL at 05:24

## 2021-04-13 RX ADMIN — FAMOTIDINE 20 MG: 20 TABLET, FILM COATED ORAL at 10:33

## 2021-04-13 RX ADMIN — PIPERACILLIN AND TAZOBACTAM 4500 MG: 4; .5 INJECTION, POWDER, LYOPHILIZED, FOR SOLUTION INTRAVENOUS; PARENTERAL at 13:53

## 2021-04-13 RX ADMIN — ACETAMINOPHEN 1000 MG: 500 TABLET ORAL at 20:54

## 2021-04-13 RX ADMIN — OXYCODONE HYDROCHLORIDE 5 MG: 5 TABLET ORAL at 05:24

## 2021-04-13 RX ADMIN — FAMOTIDINE 20 MG: 20 TABLET, FILM COATED ORAL at 20:54

## 2021-04-13 RX ADMIN — HYDROXYCHLOROQUINE SULFATE 200 MG: 200 TABLET, FILM COATED ORAL at 10:32

## 2021-04-13 RX ADMIN — METHOCARBAMOL TABLETS 750 MG: 750 TABLET, COATED ORAL at 23:02

## 2021-04-13 RX ADMIN — GABAPENTIN 900 MG: 300 CAPSULE ORAL at 23:02

## 2021-04-13 RX ADMIN — METHOCARBAMOL TABLETS 750 MG: 750 TABLET, COATED ORAL at 18:26

## 2021-04-13 RX ADMIN — HEPARIN SODIUM AND DEXTROSE 35.34 UNITS/KG/HR: 10000; 5 INJECTION INTRAVENOUS at 13:54

## 2021-04-13 RX ADMIN — GABAPENTIN 900 MG: 300 CAPSULE ORAL at 16:29

## 2021-04-13 RX ADMIN — OXYCODONE HYDROCHLORIDE 5 MG: 5 TABLET ORAL at 18:26

## 2021-04-13 RX ADMIN — HEPARIN SODIUM AND DEXTROSE 33.34 UNITS/KG/HR: 10000; 5 INJECTION INTRAVENOUS at 05:26

## 2021-04-13 ASSESSMENT — PAIN SCALES - GENERAL
PAINLEVEL_OUTOF10: 8
PAINLEVEL_OUTOF10: 9
PAINLEVEL_OUTOF10: 5
PAINLEVEL_OUTOF10: 10
PAINLEVEL_OUTOF10: 8

## 2021-04-13 NOTE — PROGRESS NOTES
At bedside, old dressings taken down and cleansed with saline solution. Photo taken of wound and uploaded to pt's chart. Wound resdressed with saline soaked fluffs, ABD pad, Kerlix, and ACE wrap. Pt tolerated well.

## 2021-04-13 NOTE — PROGRESS NOTES
At the bedside, old dressing taken down. Pulses strong with doppler, radial palpable. Wound bed rinsed with sterile water. Using sterile technique, saline soaked fluffs applied to wound, followed by ABD/kerlix and secured with ACE. Patient tolerated well with minimal pain.     Electronically signed by Tim Flores RN on 4/13/2021 at 1:43 AM
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
## 2021-04-13 NOTE — PLAN OF CARE
Problem: Pain:  Goal: Pain level will decrease  Description: Pain level will decrease  4/13/2021 0134 by Mary Wang RN  Outcome: Ongoing  4/12/2021 1831 by Cecy Ortiz RN  Outcome: Ongoing  Goal: Control of acute pain  Description: Control of acute pain  4/13/2021 0134 by Mary Wang RN  Outcome: Ongoing  4/12/2021 1831 by Cecy Ortiz RN  Outcome: Ongoing  Goal: Control of chronic pain  Description: Control of chronic pain  4/13/2021 0134 by Mary Wang RN  Outcome: Ongoing  4/12/2021 1831 by Cecy Ortiz RN  Outcome: Ongoing     Problem: Nutrition  Goal: Optimal nutrition therapy  4/13/2021 0134 by Mary Wang RN  Outcome: Ongoing  4/12/2021 1831 by Cecy Ortiz RN  Outcome: Ongoing     Problem: Skin Integrity:  Goal: Will show no infection signs and symptoms  Description: Will show no infection signs and symptoms  4/13/2021 0134 by Mary Wang RN  Outcome: Ongoing  4/12/2021 1831 by Cecy Ortiz RN  Outcome: Ongoing  Goal: Absence of new skin breakdown  Description: Absence of new skin breakdown  4/13/2021 0134 by Mary Wang RN  Outcome: Ongoing  4/12/2021 1831 by Cecy Ortiz RN  Outcome: Ongoing     Problem: Suicide risk  Goal: Provide patient with safe environment  Description: Provide patient with safe environment  4/13/2021 0134 by Mary Wang RN  Outcome: Ongoing  4/12/2021 1831 by Cecy Ortiz RN  Outcome: Ongoing
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
## 2021-04-14 LAB
PARTIAL THROMBOPLASTIN TIME: 39.8 SEC (ref 20.5–30.5)
PARTIAL THROMBOPLASTIN TIME: 60.4 SEC (ref 20.5–30.5)
PARTIAL THROMBOPLASTIN TIME: 68.3 SEC (ref 20.5–30.5)

## 2021-04-14 PROCEDURE — 6360000002 HC RX W HCPCS: Performed by: STUDENT IN AN ORGANIZED HEALTH CARE EDUCATION/TRAINING PROGRAM

## 2021-04-14 PROCEDURE — 36415 COLL VENOUS BLD VENIPUNCTURE: CPT

## 2021-04-14 PROCEDURE — 85730 THROMBOPLASTIN TIME PARTIAL: CPT

## 2021-04-14 PROCEDURE — 6370000000 HC RX 637 (ALT 250 FOR IP): Performed by: STUDENT IN AN ORGANIZED HEALTH CARE EDUCATION/TRAINING PROGRAM

## 2021-04-14 PROCEDURE — 97535 SELF CARE MNGMENT TRAINING: CPT

## 2021-04-14 PROCEDURE — 2580000003 HC RX 258: Performed by: STUDENT IN AN ORGANIZED HEALTH CARE EDUCATION/TRAINING PROGRAM

## 2021-04-14 PROCEDURE — 97110 THERAPEUTIC EXERCISES: CPT

## 2021-04-14 PROCEDURE — 1200000000 HC SEMI PRIVATE

## 2021-04-14 PROCEDURE — 6370000000 HC RX 637 (ALT 250 FOR IP): Performed by: NURSE PRACTITIONER

## 2021-04-14 RX ORDER — GABAPENTIN 300 MG/1
900 CAPSULE ORAL 3 TIMES DAILY
Status: DISCONTINUED | OUTPATIENT
Start: 2021-04-14 | End: 2021-04-22 | Stop reason: HOSPADM

## 2021-04-14 RX ORDER — METHOCARBAMOL 750 MG/1
750 TABLET, FILM COATED ORAL EVERY 6 HOURS SCHEDULED
Status: DISCONTINUED | OUTPATIENT
Start: 2021-04-14 | End: 2021-04-19

## 2021-04-14 RX ORDER — LANOLIN ALCOHOL/MO/W.PET/CERES
6 CREAM (GRAM) TOPICAL NIGHTLY
Status: DISCONTINUED | OUTPATIENT
Start: 2021-04-14 | End: 2021-04-22 | Stop reason: HOSPADM

## 2021-04-14 RX ADMIN — OXYCODONE HYDROCHLORIDE 5 MG: 5 TABLET ORAL at 22:54

## 2021-04-14 RX ADMIN — HEPARIN SODIUM AND DEXTROSE 37.34 UNITS/KG/HR: 10000; 5 INJECTION INTRAVENOUS at 10:30

## 2021-04-14 RX ADMIN — OXYCODONE HYDROCHLORIDE 5 MG: 5 TABLET ORAL at 16:46

## 2021-04-14 RX ADMIN — FAMOTIDINE 20 MG: 20 TABLET, FILM COATED ORAL at 22:55

## 2021-04-14 RX ADMIN — Medication 6 MG: at 22:54

## 2021-04-14 RX ADMIN — ACETAMINOPHEN 1000 MG: 500 TABLET ORAL at 22:54

## 2021-04-14 RX ADMIN — PIPERACILLIN AND TAZOBACTAM 4500 MG: 4; .5 INJECTION, POWDER, LYOPHILIZED, FOR SOLUTION INTRAVENOUS; PARENTERAL at 12:41

## 2021-04-14 RX ADMIN — METHOCARBAMOL TABLETS 750 MG: 750 TABLET, COATED ORAL at 12:41

## 2021-04-14 RX ADMIN — PIPERACILLIN AND TAZOBACTAM 4500 MG: 4; .5 INJECTION, POWDER, LYOPHILIZED, FOR SOLUTION INTRAVENOUS; PARENTERAL at 04:50

## 2021-04-14 RX ADMIN — OXYCODONE HYDROCHLORIDE 5 MG: 5 TABLET ORAL at 04:19

## 2021-04-14 RX ADMIN — ACETAMINOPHEN 1000 MG: 500 TABLET ORAL at 06:34

## 2021-04-14 RX ADMIN — OXYCODONE HYDROCHLORIDE 5 MG: 5 TABLET ORAL at 10:29

## 2021-04-14 RX ADMIN — HYDROXYCHLOROQUINE SULFATE 200 MG: 200 TABLET, FILM COATED ORAL at 09:49

## 2021-04-14 RX ADMIN — PIPERACILLIN AND TAZOBACTAM 4500 MG: 4; .5 INJECTION, POWDER, LYOPHILIZED, FOR SOLUTION INTRAVENOUS; PARENTERAL at 22:53

## 2021-04-14 RX ADMIN — POLYMYXIN B SULFATE, BACITRACIN ZINC, NEOMYCIN SULFATE: 5000; 3.5; 4 OINTMENT TOPICAL at 22:54

## 2021-04-14 RX ADMIN — ACETAMINOPHEN 1000 MG: 500 TABLET ORAL at 15:19

## 2021-04-14 RX ADMIN — METHOCARBAMOL TABLETS 750 MG: 750 TABLET, COATED ORAL at 18:59

## 2021-04-14 RX ADMIN — GABAPENTIN 900 MG: 300 CAPSULE ORAL at 16:46

## 2021-04-14 RX ADMIN — METHOCARBAMOL TABLETS 750 MG: 750 TABLET, COATED ORAL at 06:34

## 2021-04-14 RX ADMIN — HEPARIN SODIUM 2000 UNITS: 1000 INJECTION INTRAVENOUS; SUBCUTANEOUS at 02:53

## 2021-04-14 RX ADMIN — FAMOTIDINE 20 MG: 20 TABLET, FILM COATED ORAL at 09:49

## 2021-04-14 RX ADMIN — GABAPENTIN 900 MG: 300 CAPSULE ORAL at 06:34

## 2021-04-14 ASSESSMENT — PAIN SCALES - GENERAL
PAINLEVEL_OUTOF10: 5
PAINLEVEL_OUTOF10: 8

## 2021-04-14 ASSESSMENT — PAIN DESCRIPTION - LOCATION: LOCATION: HAND

## 2021-04-14 NOTE — PROGRESS NOTES
Pt complaining of pain due to the wrist splint, asking it be taken off. Pt educated by Eliazar Velez on risks of not wearing it, but refusing to wear it at this time.
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
## 2021-04-14 NOTE — PROGRESS NOTES
At the bedside, old dressing taken down. Pulses strong with doppler, radial palpable. Wound bed rinsed with sterile water. Using sterile technique, saline soaked fluffs applied to wound, followed by ABD/kerlix and secured with ACE. Patient tolerated well.     Electronically signed by Mark Townsend RN on 4/14/2021 at 4:23 AM

## 2021-04-14 NOTE — PROGRESS NOTES
Spoke to Dr. Lester Ruiz with rheumatology. States he does not need to see the patient while in the hospital unless patient is having issues with his lupus at this time. States to have patient see him in the office as as outpatient.     Electronically signed by ALLISON Fitzpatrick CNP on 4/14/2021 at 3:01 PM

## 2021-04-14 NOTE — PROGRESS NOTES
At bedside, old dressings taken down and cleansed with saline solution. Wound resdressed with saline soaked fluffs, ABD pad, Kerlix, and ACE wrap. Splint put on wrist. Pt tolerated well.

## 2021-04-14 NOTE — PROGRESS NOTES
Plastics - [id number]    Patient seen, dressing changed. Wound is showing good granulation peripherally. Tendon necrotic. This was sharply debrided away as well as some necrotic muscle under the tendon. No evidence of infection. Continue NS damp to damp dressings. I discussed with patient that I have him scheduled for Friday for grafting if wound is ready. I will plan to see him earlier Friday morning and check the wound to see if it is ready for grafting. If not I will delay. He voiced understanding.
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
## 2021-04-14 NOTE — PROGRESS NOTES
closure  - Continue dressing changes per RN   - NWB. Ok for gentle range of motion to maintain extension of digits  - Pain control: per primary team recommendations. Recommend multimodal pain management protocols  - Ice (20 min, 1 hour off) and elevation for edema/pain control  - DVT ppx: per primary   - Reaching out to Dr. Robert Hendrix today for any additional definitive plan. Potential to address tendons and final closure  - Please page ortho with any questions or concerns    Nitesh Baer DO  PGY-1, Department of [facility] [address], [place], [address] [place] [address] Drive  5:00 [place] 4/14/2021    PGY-2 Addendum    Patient seen and examined personally, and I agree with subjective portion as stated above by Dr. Tomás Baer. All disagreements have been changed in the above note. Patient doing well. Sitter at bedside. No acute events overnight. Physical exam unchanged since last evaluation. Continues to have necrosis/eschar formation to the tip of the thumb and small finger, less so at the tips of the remaining digits. Median nerve with no improvement still. Able to flex and extend DIPJs but minimally. Will reach out to Dr. Robert Hendrix today for definitive treatment, possibly to address tendons and final coverage of wound. Continue damp NS dressings.     Electronically signed by Farida Chau DO, on 4/14/2021 at 5:35 AM.

## 2021-04-14 NOTE — CARE COORDINATION
Transitional Planning:    Plan for surgery soon but unsure of when.  Once medically stable, plan is to transfer to [facility]

## 2021-04-15 ENCOUNTER — ANESTHESIA EVENT (OUTPATIENT)
Dept: OPERATING ROOM | Age: 41
DRG: 904 | End: 2021-04-15
Payer: COMMERCIAL

## 2021-04-15 LAB — PARTIAL THROMBOPLASTIN TIME: 62 SEC (ref 20.5–30.5)

## 2021-04-15 PROCEDURE — 6370000000 HC RX 637 (ALT 250 FOR IP): Performed by: STUDENT IN AN ORGANIZED HEALTH CARE EDUCATION/TRAINING PROGRAM

## 2021-04-15 PROCEDURE — 6370000000 HC RX 637 (ALT 250 FOR IP): Performed by: NURSE PRACTITIONER

## 2021-04-15 PROCEDURE — 36415 COLL VENOUS BLD VENIPUNCTURE: CPT

## 2021-04-15 PROCEDURE — 6360000002 HC RX W HCPCS: Performed by: STUDENT IN AN ORGANIZED HEALTH CARE EDUCATION/TRAINING PROGRAM

## 2021-04-15 PROCEDURE — 2580000003 HC RX 258: Performed by: STUDENT IN AN ORGANIZED HEALTH CARE EDUCATION/TRAINING PROGRAM

## 2021-04-15 PROCEDURE — 1200000000 HC SEMI PRIVATE

## 2021-04-15 PROCEDURE — 85730 THROMBOPLASTIN TIME PARTIAL: CPT

## 2021-04-15 RX ADMIN — GABAPENTIN 900 MG: 300 CAPSULE ORAL at 23:58

## 2021-04-15 RX ADMIN — GABAPENTIN 900 MG: 300 CAPSULE ORAL at 01:30

## 2021-04-15 RX ADMIN — HYDROXYCHLOROQUINE SULFATE 200 MG: 200 TABLET, FILM COATED ORAL at 09:21

## 2021-04-15 RX ADMIN — OXYCODONE HYDROCHLORIDE 5 MG: 5 TABLET ORAL at 05:07

## 2021-04-15 RX ADMIN — FAMOTIDINE 20 MG: 20 TABLET, FILM COATED ORAL at 21:57

## 2021-04-15 RX ADMIN — ACETAMINOPHEN 1000 MG: 500 TABLET ORAL at 21:57

## 2021-04-15 RX ADMIN — PIPERACILLIN AND TAZOBACTAM 4500 MG: 4; .5 INJECTION, POWDER, LYOPHILIZED, FOR SOLUTION INTRAVENOUS; PARENTERAL at 05:03

## 2021-04-15 RX ADMIN — METHOCARBAMOL TABLETS 750 MG: 750 TABLET, COATED ORAL at 01:30

## 2021-04-15 RX ADMIN — GABAPENTIN 900 MG: 300 CAPSULE ORAL at 17:26

## 2021-04-15 RX ADMIN — FAMOTIDINE 20 MG: 20 TABLET, FILM COATED ORAL at 09:21

## 2021-04-15 RX ADMIN — METHOCARBAMOL TABLETS 750 MG: 750 TABLET, COATED ORAL at 05:03

## 2021-04-15 RX ADMIN — METHOCARBAMOL TABLETS 750 MG: 750 TABLET, COATED ORAL at 18:38

## 2021-04-15 RX ADMIN — OXYCODONE HYDROCHLORIDE 5 MG: 5 TABLET ORAL at 23:57

## 2021-04-15 RX ADMIN — ACETAMINOPHEN 1000 MG: 500 TABLET ORAL at 14:02

## 2021-04-15 RX ADMIN — METHOCARBAMOL TABLETS 750 MG: 750 TABLET, COATED ORAL at 23:58

## 2021-04-15 RX ADMIN — METHOCARBAMOL TABLETS 750 MG: 750 TABLET, COATED ORAL at 12:58

## 2021-04-15 RX ADMIN — PIPERACILLIN AND TAZOBACTAM 4500 MG: 4; .5 INJECTION, POWDER, LYOPHILIZED, FOR SOLUTION INTRAVENOUS; PARENTERAL at 14:02

## 2021-04-15 RX ADMIN — ACETAMINOPHEN 1000 MG: 500 TABLET ORAL at 05:03

## 2021-04-15 RX ADMIN — GABAPENTIN 900 MG: 300 CAPSULE ORAL at 09:21

## 2021-04-15 RX ADMIN — Medication 6 MG: at 23:58

## 2021-04-15 RX ADMIN — PIPERACILLIN AND TAZOBACTAM 4500 MG: 4; .5 INJECTION, POWDER, LYOPHILIZED, FOR SOLUTION INTRAVENOUS; PARENTERAL at 21:30

## 2021-04-15 RX ADMIN — OXYCODONE HYDROCHLORIDE 5 MG: 5 TABLET ORAL at 17:27

## 2021-04-15 RX ADMIN — OXYCODONE HYDROCHLORIDE 5 MG: 5 TABLET ORAL at 11:19

## 2021-04-15 ASSESSMENT — PAIN SCALES - GENERAL
PAINLEVEL_OUTOF10: 7
PAINLEVEL_OUTOF10: 5
PAINLEVEL_OUTOF10: 9
PAINLEVEL_OUTOF10: 8
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
## 2021-04-15 NOTE — CARE COORDINATION
Transitional Planning:    Pt tentatively having grafting done Friday by plastics. Once medically stable, plan is to transfer to [facility].

## 2021-04-15 NOTE — PROGRESS NOTES
Old dressings taken down at bedside and cleansed with saline solution. Wound redressed with saline soaked fluffs, ABD pad, Kerlix, and ACE wrap. Pt refusing splint be put on wrist at this time. Photo taken for pt's chart. Pt tolerated well.

## 2021-04-15 NOTE — PROGRESS NOTES
Old dressings taken down at bedside and cleansed with saline solution. Wound redressed with saline soaked fluffs, ABD pad, Kerlix, and ACE wrap. Pt refusing splint be put on wrist at this time. Pt tolerated well.
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
## 2021-04-16 ENCOUNTER — ANESTHESIA (OUTPATIENT)
Dept: OPERATING ROOM | Age: 41
DRG: 904 | End: 2021-04-16
Payer: COMMERCIAL

## 2021-04-16 VITALS — TEMPERATURE: 98 F | DIASTOLIC BLOOD PRESSURE: 69 MMHG | OXYGEN SATURATION: 98 % | SYSTOLIC BLOOD PRESSURE: 97 MMHG

## 2021-04-16 LAB
ABSOLUTE EOS #: 0.37 K/UL (ref 0–0.44)
ABSOLUTE IMMATURE GRANULOCYTE: 0.12 K/UL (ref 0–0.3)
ABSOLUTE LYMPH #: 2.74 K/UL (ref 1.1–3.7)
ABSOLUTE MONO #: 1.02 K/UL (ref 0.1–1.2)
ANION GAP SERPL CALCULATED.3IONS-SCNC: 10 MMOL/L (ref 9–17)
BASOPHILS # BLD: 0 % (ref 0–2)
BASOPHILS ABSOLUTE: 0.04 K/UL (ref 0–0.2)
BUN BLDV-MCNC: 14 MG/DL (ref 6–20)
BUN/CREAT BLD: ABNORMAL (ref 9–20)
CALCIUM SERPL-MCNC: 8.6 MG/DL (ref 8.6–10.4)
CHLORIDE BLD-SCNC: 106 MMOL/L (ref 98–107)
CO2: 24 MMOL/L (ref 20–31)
CREAT SERPL-MCNC: 0.65 MG/DL (ref 0.7–1.2)
DIFFERENTIAL TYPE: ABNORMAL
EOSINOPHILS RELATIVE PERCENT: 4 % (ref 1–4)
GFR AFRICAN AMERICAN: >60 ML/MIN
GFR NON-AFRICAN AMERICAN: >60 ML/MIN
GFR SERPL CREATININE-BSD FRML MDRD: ABNORMAL ML/MIN/{1.73_M2}
GFR SERPL CREATININE-BSD FRML MDRD: ABNORMAL ML/MIN/{1.73_M2}
GLUCOSE BLD-MCNC: 99 MG/DL (ref 70–99)
HCT VFR BLD CALC: 28 % (ref 40.7–50.3)
HEMOGLOBIN: 8.4 G/DL (ref 13–17)
IMMATURE GRANULOCYTES: 1 %
LYMPHOCYTES # BLD: 27 % (ref 24–43)
MCH RBC QN AUTO: 30.2 PG (ref 25.2–33.5)
MCHC RBC AUTO-ENTMCNC: 30 G/DL (ref 28.4–34.8)
MCV RBC AUTO: 100.7 FL (ref 82.6–102.9)
MONOCYTES # BLD: 10 % (ref 3–12)
NRBC AUTOMATED: 0 PER 100 WBC
PARTIAL THROMBOPLASTIN TIME: 47.3 SEC (ref 20.5–30.5)
PDW BLD-RTO: 14.1 % (ref 11.8–14.4)
PLATELET # BLD: 584 K/UL (ref 138–453)
PLATELET ESTIMATE: ABNORMAL
PMV BLD AUTO: 8.2 FL (ref 8.1–13.5)
POTASSIUM SERPL-SCNC: 4.1 MMOL/L (ref 3.7–5.3)
RBC # BLD: 2.78 M/UL (ref 4.21–5.77)
RBC # BLD: ABNORMAL 10*6/UL
SEG NEUTROPHILS: 58 % (ref 36–65)
SEGMENTED NEUTROPHILS ABSOLUTE COUNT: 6.05 K/UL (ref 1.5–8.1)
SODIUM BLD-SCNC: 140 MMOL/L (ref 135–144)
WBC # BLD: 10.3 K/UL (ref 3.5–11.3)
WBC # BLD: ABNORMAL 10*3/UL

## 2021-04-16 PROCEDURE — 3600000004 HC SURGERY LEVEL 4 BASE: Performed by: PLASTIC SURGERY

## 2021-04-16 PROCEDURE — 85730 THROMBOPLASTIN TIME PARTIAL: CPT

## 2021-04-16 PROCEDURE — 2500000003 HC RX 250 WO HCPCS: Performed by: NURSE ANESTHETIST, CERTIFIED REGISTERED

## 2021-04-16 PROCEDURE — 6360000002 HC RX W HCPCS: Performed by: STUDENT IN AN ORGANIZED HEALTH CARE EDUCATION/TRAINING PROGRAM

## 2021-04-16 PROCEDURE — 80048 BASIC METABOLIC PNL TOTAL CA: CPT

## 2021-04-16 PROCEDURE — 6370000000 HC RX 637 (ALT 250 FOR IP): Performed by: PLASTIC SURGERY

## 2021-04-16 PROCEDURE — 6370000000 HC RX 637 (ALT 250 FOR IP): Performed by: STUDENT IN AN ORGANIZED HEALTH CARE EDUCATION/TRAINING PROGRAM

## 2021-04-16 PROCEDURE — 85025 COMPLETE CBC W/AUTO DIFF WBC: CPT

## 2021-04-16 PROCEDURE — 3700000001 HC ADD 15 MINUTES (ANESTHESIA): Performed by: PLASTIC SURGERY

## 2021-04-16 PROCEDURE — 7100000000 HC PACU RECOVERY - FIRST 15 MIN: Performed by: PLASTIC SURGERY

## 2021-04-16 PROCEDURE — 2709999900 HC NON-CHARGEABLE SUPPLY: Performed by: PLASTIC SURGERY

## 2021-04-16 PROCEDURE — 6360000002 HC RX W HCPCS: Performed by: PLASTIC SURGERY

## 2021-04-16 PROCEDURE — 1200000000 HC SEMI PRIVATE

## 2021-04-16 PROCEDURE — 2580000003 HC RX 258: Performed by: STUDENT IN AN ORGANIZED HEALTH CARE EDUCATION/TRAINING PROGRAM

## 2021-04-16 PROCEDURE — 6360000002 HC RX W HCPCS: Performed by: NURSE ANESTHETIST, CERTIFIED REGISTERED

## 2021-04-16 PROCEDURE — 2580000003 HC RX 258: Performed by: PLASTIC SURGERY

## 2021-04-16 PROCEDURE — 3600000014 HC SURGERY LEVEL 4 ADDTL 15MIN: Performed by: PLASTIC SURGERY

## 2021-04-16 PROCEDURE — 3700000000 HC ANESTHESIA ATTENDED CARE: Performed by: PLASTIC SURGERY

## 2021-04-16 PROCEDURE — 7100000001 HC PACU RECOVERY - ADDTL 15 MIN: Performed by: PLASTIC SURGERY

## 2021-04-16 PROCEDURE — 6360000002 HC RX W HCPCS: Performed by: ANESTHESIOLOGY

## 2021-04-16 PROCEDURE — 6370000000 HC RX 637 (ALT 250 FOR IP): Performed by: NURSE PRACTITIONER

## 2021-04-16 PROCEDURE — 36415 COLL VENOUS BLD VENIPUNCTURE: CPT

## 2021-04-16 RX ORDER — PROPOFOL 10 MG/ML
INJECTION, EMULSION INTRAVENOUS PRN
Status: DISCONTINUED | OUTPATIENT
Start: 2021-04-16 | End: 2021-04-16 | Stop reason: SDUPTHER

## 2021-04-16 RX ORDER — FENTANYL CITRATE 50 UG/ML
25 INJECTION, SOLUTION INTRAMUSCULAR; INTRAVENOUS EVERY 5 MIN PRN
Status: DISCONTINUED | OUTPATIENT
Start: 2021-04-16 | End: 2021-04-16 | Stop reason: HOSPADM

## 2021-04-16 RX ORDER — ONDANSETRON 2 MG/ML
4 INJECTION INTRAMUSCULAR; INTRAVENOUS EVERY 6 HOURS PRN
Status: DISCONTINUED | OUTPATIENT
Start: 2021-04-16 | End: 2021-04-19

## 2021-04-16 RX ORDER — ONDANSETRON 2 MG/ML
INJECTION INTRAMUSCULAR; INTRAVENOUS PRN
Status: DISCONTINUED | OUTPATIENT
Start: 2021-04-16 | End: 2021-04-16 | Stop reason: SDUPTHER

## 2021-04-16 RX ORDER — PHENYLEPHRINE HCL IN 0.9% NACL 1 MG/10 ML
SYRINGE (ML) INTRAVENOUS PRN
Status: DISCONTINUED | OUTPATIENT
Start: 2021-04-16 | End: 2021-04-16 | Stop reason: SDUPTHER

## 2021-04-16 RX ORDER — SODIUM CHLORIDE 9 MG/ML
25 INJECTION, SOLUTION INTRAVENOUS PRN
Status: DISCONTINUED | OUTPATIENT
Start: 2021-04-16 | End: 2021-04-19

## 2021-04-16 RX ORDER — SODIUM CHLORIDE, SODIUM LACTATE, POTASSIUM CHLORIDE, CALCIUM CHLORIDE 600; 310; 30; 20 MG/100ML; MG/100ML; MG/100ML; MG/100ML
INJECTION, SOLUTION INTRAVENOUS CONTINUOUS
Status: DISCONTINUED | OUTPATIENT
Start: 2021-04-16 | End: 2021-04-16

## 2021-04-16 RX ORDER — LIDOCAINE HYDROCHLORIDE 10 MG/ML
INJECTION, SOLUTION EPIDURAL; INFILTRATION; INTRACAUDAL; PERINEURAL PRN
Status: DISCONTINUED | OUTPATIENT
Start: 2021-04-16 | End: 2021-04-16 | Stop reason: SDUPTHER

## 2021-04-16 RX ORDER — HYDROCODONE BITARTRATE AND ACETAMINOPHEN 5; 325 MG/1; MG/1
1 TABLET ORAL PRN
Status: DISCONTINUED | OUTPATIENT
Start: 2021-04-16 | End: 2021-04-16 | Stop reason: HOSPADM

## 2021-04-16 RX ORDER — PROMETHAZINE HYDROCHLORIDE 12.5 MG/1
12.5 TABLET ORAL EVERY 6 HOURS PRN
Status: DISCONTINUED | OUTPATIENT
Start: 2021-04-16 | End: 2021-04-19

## 2021-04-16 RX ORDER — CALCIUM CARBONATE 200(500)MG
500 TABLET,CHEWABLE ORAL 3 TIMES DAILY PRN
Status: DISCONTINUED | OUTPATIENT
Start: 2021-04-16 | End: 2021-04-22 | Stop reason: HOSPADM

## 2021-04-16 RX ORDER — IBUPROFEN 400 MG/1
400 TABLET ORAL EVERY 6 HOURS PRN
Status: DISCONTINUED | OUTPATIENT
Start: 2021-04-16 | End: 2021-04-19

## 2021-04-16 RX ORDER — HYDROCODONE BITARTRATE AND ACETAMINOPHEN 5; 325 MG/1; MG/1
2 TABLET ORAL PRN
Status: DISCONTINUED | OUTPATIENT
Start: 2021-04-16 | End: 2021-04-16 | Stop reason: HOSPADM

## 2021-04-16 RX ORDER — GINSENG 100 MG
CAPSULE ORAL PRN
Status: DISCONTINUED | OUTPATIENT
Start: 2021-04-16 | End: 2021-04-16 | Stop reason: ALTCHOICE

## 2021-04-16 RX ORDER — FENTANYL CITRATE 50 UG/ML
50 INJECTION, SOLUTION INTRAMUSCULAR; INTRAVENOUS EVERY 5 MIN PRN
Status: COMPLETED | OUTPATIENT
Start: 2021-04-16 | End: 2021-04-16

## 2021-04-16 RX ORDER — MAGNESIUM HYDROXIDE 1200 MG/15ML
LIQUID ORAL CONTINUOUS PRN
Status: COMPLETED | OUTPATIENT
Start: 2021-04-16 | End: 2021-04-16

## 2021-04-16 RX ORDER — FENTANYL CITRATE 50 UG/ML
INJECTION, SOLUTION INTRAMUSCULAR; INTRAVENOUS PRN
Status: DISCONTINUED | OUTPATIENT
Start: 2021-04-16 | End: 2021-04-16 | Stop reason: SDUPTHER

## 2021-04-16 RX ORDER — ROCURONIUM BROMIDE 10 MG/ML
INJECTION, SOLUTION INTRAVENOUS PRN
Status: DISCONTINUED | OUTPATIENT
Start: 2021-04-16 | End: 2021-04-16 | Stop reason: SDUPTHER

## 2021-04-16 RX ORDER — EPINEPHRINE 1 MG/ML(1)
AMPUL (ML) INJECTION PRN
Status: DISCONTINUED | OUTPATIENT
Start: 2021-04-16 | End: 2021-04-16 | Stop reason: ALTCHOICE

## 2021-04-16 RX ADMIN — ACETAMINOPHEN 1000 MG: 500 TABLET ORAL at 06:37

## 2021-04-16 RX ADMIN — PIPERACILLIN AND TAZOBACTAM 4500 MG: 4; .5 INJECTION, POWDER, LYOPHILIZED, FOR SOLUTION INTRAVENOUS; PARENTERAL at 22:00

## 2021-04-16 RX ADMIN — FENTANYL CITRATE 50 MCG: 50 INJECTION, SOLUTION INTRAMUSCULAR; INTRAVENOUS at 14:25

## 2021-04-16 RX ADMIN — PROPOFOL 150 MG: 10 INJECTION, EMULSION INTRAVENOUS at 12:46

## 2021-04-16 RX ADMIN — FAMOTIDINE 20 MG: 20 TABLET, FILM COATED ORAL at 19:43

## 2021-04-16 RX ADMIN — OXYCODONE HYDROCHLORIDE 5 MG: 5 TABLET ORAL at 23:03

## 2021-04-16 RX ADMIN — Medication 100 MCG: at 13:14

## 2021-04-16 RX ADMIN — PIPERACILLIN AND TAZOBACTAM 4500 MG: 4; .5 INJECTION, POWDER, LYOPHILIZED, FOR SOLUTION INTRAVENOUS; PARENTERAL at 06:37

## 2021-04-16 RX ADMIN — METHOCARBAMOL TABLETS 750 MG: 750 TABLET, COATED ORAL at 23:04

## 2021-04-16 RX ADMIN — LIDOCAINE HYDROCHLORIDE 50 MG: 10 INJECTION, SOLUTION EPIDURAL; INFILTRATION; INTRACAUDAL; PERINEURAL at 12:46

## 2021-04-16 RX ADMIN — FENTANYL CITRATE 50 MCG: 50 INJECTION, SOLUTION INTRAMUSCULAR; INTRAVENOUS at 14:06

## 2021-04-16 RX ADMIN — HEPARIN SODIUM 2000 UNITS: 1000 INJECTION INTRAVENOUS; SUBCUTANEOUS at 23:16

## 2021-04-16 RX ADMIN — CALCIUM CARBONATE 500 MG: 500 TABLET, CHEWABLE ORAL at 07:54

## 2021-04-16 RX ADMIN — FENTANYL CITRATE 100 MCG: 50 INJECTION, SOLUTION INTRAMUSCULAR; INTRAVENOUS at 12:46

## 2021-04-16 RX ADMIN — METHOCARBAMOL TABLETS 750 MG: 750 TABLET, COATED ORAL at 17:02

## 2021-04-16 RX ADMIN — HYDROXYCHLOROQUINE SULFATE 200 MG: 200 TABLET, FILM COATED ORAL at 17:03

## 2021-04-16 RX ADMIN — FENTANYL CITRATE 50 MCG: 50 INJECTION, SOLUTION INTRAMUSCULAR; INTRAVENOUS at 14:34

## 2021-04-16 RX ADMIN — SODIUM CHLORIDE, POTASSIUM CHLORIDE, SODIUM LACTATE AND CALCIUM CHLORIDE: 600; 310; 30; 20 INJECTION, SOLUTION INTRAVENOUS at 06:37

## 2021-04-16 RX ADMIN — ROCURONIUM BROMIDE 50 MG: 10 INJECTION INTRAVENOUS at 12:46

## 2021-04-16 RX ADMIN — METHOCARBAMOL TABLETS 750 MG: 750 TABLET, COATED ORAL at 06:37

## 2021-04-16 RX ADMIN — IBUPROFEN 400 MG: 400 TABLET, FILM COATED ORAL at 19:42

## 2021-04-16 RX ADMIN — ACETAMINOPHEN 1000 MG: 500 TABLET ORAL at 19:41

## 2021-04-16 RX ADMIN — GABAPENTIN 900 MG: 300 CAPSULE ORAL at 17:02

## 2021-04-16 RX ADMIN — Medication 100 MCG: at 13:22

## 2021-04-16 RX ADMIN — OXYCODONE HYDROCHLORIDE 5 MG: 5 TABLET ORAL at 17:06

## 2021-04-16 RX ADMIN — OXYCODONE HYDROCHLORIDE 5 MG: 5 TABLET ORAL at 06:38

## 2021-04-16 RX ADMIN — FENTANYL CITRATE 50 MCG: 50 INJECTION, SOLUTION INTRAMUSCULAR; INTRAVENOUS at 13:57

## 2021-04-16 RX ADMIN — SODIUM CHLORIDE, POTASSIUM CHLORIDE, SODIUM LACTATE AND CALCIUM CHLORIDE: 600; 310; 30; 20 INJECTION, SOLUTION INTRAVENOUS at 19:39

## 2021-04-16 RX ADMIN — ONDANSETRON 4 MG: 2 INJECTION INTRAMUSCULAR; INTRAVENOUS at 13:23

## 2021-04-16 ASSESSMENT — PULMONARY FUNCTION TESTS
PIF_VALUE: 2
PIF_VALUE: 14
PIF_VALUE: 15
PIF_VALUE: 15
PIF_VALUE: 10
PIF_VALUE: 14
PIF_VALUE: 16
PIF_VALUE: 0
PIF_VALUE: 1
PIF_VALUE: 15
PIF_VALUE: 16
PIF_VALUE: 15
PIF_VALUE: 2
PIF_VALUE: 15
PIF_VALUE: 12
PIF_VALUE: 14
PIF_VALUE: 14
PIF_VALUE: 9
PIF_VALUE: 12
PIF_VALUE: 5
PIF_VALUE: 21
PIF_VALUE: 16

## 2021-04-16 ASSESSMENT — PAIN - FUNCTIONAL ASSESSMENT
PAIN_FUNCTIONAL_ASSESSMENT: 0-10
PAIN_FUNCTIONAL_ASSESSMENT: PREVENTS OR INTERFERES SOME ACTIVE ACTIVITIES AND ADLS

## 2021-04-16 ASSESSMENT — PAIN DESCRIPTION - PAIN TYPE
TYPE: ACUTE PAIN;SURGICAL PAIN
TYPE: SURGICAL PAIN
TYPE: SURGICAL PAIN

## 2021-04-16 ASSESSMENT — PAIN SCALES - GENERAL
PAINLEVEL_OUTOF10: 9
PAINLEVEL_OUTOF10: 5
PAINLEVEL_OUTOF10: 7
PAINLEVEL_OUTOF10: 8
PAINLEVEL_OUTOF10: 3

## 2021-04-16 ASSESSMENT — PAIN DESCRIPTION - FREQUENCY
FREQUENCY: CONTINUOUS
FREQUENCY: CONTINUOUS

## 2021-04-16 ASSESSMENT — PAIN DESCRIPTION - ONSET
ONSET: ON-GOING
ONSET: ON-GOING

## 2021-04-16 ASSESSMENT — PAIN DESCRIPTION - LOCATION: LOCATION: ARM;LEG

## 2021-04-16 ASSESSMENT — PAIN DESCRIPTION - PROGRESSION: CLINICAL_PROGRESSION: NOT CHANGED

## 2021-04-16 ASSESSMENT — PAIN DESCRIPTION - DESCRIPTORS
DESCRIPTORS: ACHING;POUNDING
DESCRIPTORS: BURNING
DESCRIPTORS: BURNING

## 2021-04-16 ASSESSMENT — PAIN DESCRIPTION - ORIENTATION
ORIENTATION: LEFT
ORIENTATION: LEFT

## 2021-04-16 NOTE — BRIEF OP NOTE
Brief Postoperative Note      Patient: Aaron Bettencourt  YOB: 1980  MRN: [id number]    Date of Procedure: 4/16/2021    Pre-Op Diagnosis: LEFT FOREARM WOUND    Post-Op Diagnosis: Same       Procedure(s):  DEBRIDEMENT, STSG FOREARM    Surgeon(s):  Vera Willis MD    Assistant:  * No surgical staff found *    Anesthesia: General    Estimated Blood Loss (mL): Minimal    Complications: None    Specimens:   * No specimens in log *    Implants:  * No implants in log *      Drains:   [REMOVED] Urethral Catheter Non-latex 16 fr (Removed)   Catheter Indications Perioperative use in selected surgeries including but not limited to urologic, pelvic or need for intraoperative monitoring of urinary output due to prolonged surgery, large volume infusion or need for diuretic therapy in surgery 03/28/21 0800   Site Assessment No urethral drainage 03/28/21 0800   Urine Color Yellow 03/28/21 0800   Urine Appearance Clear 03/28/21 0800   Output (mL) 250 mL 03/28/21 1000   Provider Notified to Remove Yes 03/28/21 0800       Findings:     Electronically signed by Vera Willis MD on 4/16/2021 at 1:35 PM
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
## 2021-04-16 NOTE — PROGRESS NOTES
Occupational Therapy    Occupational Therapy Not Seen Note    DATE: 2021  Name: Carlos Manuel Castañeda  : 1980  MRN: [id number]    Patient not available for Occupational Therapy due to:    Surgery/Procedure: Pt off floor in OR for possible grafting vs debridement    Electronically signed by DULCE Trinidad on 2021 at 11:16 AM

## 2021-04-17 LAB
PARTIAL THROMBOPLASTIN TIME: 65.1 SEC (ref 20.5–30.5)
PARTIAL THROMBOPLASTIN TIME: 67.5 SEC (ref 20.5–30.5)
PARTIAL THROMBOPLASTIN TIME: 75.8 SEC (ref 20.5–30.5)

## 2021-04-17 PROCEDURE — 6360000002 HC RX W HCPCS: Performed by: PLASTIC SURGERY

## 2021-04-17 PROCEDURE — 6370000000 HC RX 637 (ALT 250 FOR IP): Performed by: PLASTIC SURGERY

## 2021-04-17 PROCEDURE — 36415 COLL VENOUS BLD VENIPUNCTURE: CPT

## 2021-04-17 PROCEDURE — 1200000000 HC SEMI PRIVATE

## 2021-04-17 PROCEDURE — 2580000003 HC RX 258: Performed by: PLASTIC SURGERY

## 2021-04-17 PROCEDURE — 85730 THROMBOPLASTIN TIME PARTIAL: CPT

## 2021-04-17 RX ADMIN — Medication 6 MG: at 00:00

## 2021-04-17 RX ADMIN — METHOCARBAMOL TABLETS 750 MG: 750 TABLET, COATED ORAL at 13:02

## 2021-04-17 RX ADMIN — OXYCODONE HYDROCHLORIDE 5 MG: 5 TABLET ORAL at 20:15

## 2021-04-17 RX ADMIN — ACETAMINOPHEN 1000 MG: 500 TABLET ORAL at 20:15

## 2021-04-17 RX ADMIN — HEPARIN SODIUM AND DEXTROSE 39.4 UNITS/KG/HR: 10000; 5 INJECTION INTRAVENOUS at 02:00

## 2021-04-17 RX ADMIN — HYDROXYCHLOROQUINE SULFATE 200 MG: 200 TABLET, FILM COATED ORAL at 09:24

## 2021-04-17 RX ADMIN — OXYCODONE HYDROCHLORIDE 5 MG: 5 TABLET ORAL at 13:02

## 2021-04-17 RX ADMIN — PIPERACILLIN AND TAZOBACTAM 4500 MG: 4; .5 INJECTION, POWDER, LYOPHILIZED, FOR SOLUTION INTRAVENOUS; PARENTERAL at 20:31

## 2021-04-17 RX ADMIN — PIPERACILLIN AND TAZOBACTAM 4500 MG: 4; .5 INJECTION, POWDER, LYOPHILIZED, FOR SOLUTION INTRAVENOUS; PARENTERAL at 05:19

## 2021-04-17 RX ADMIN — OXYCODONE HYDROCHLORIDE 5 MG: 5 TABLET ORAL at 05:12

## 2021-04-17 RX ADMIN — FAMOTIDINE 20 MG: 20 TABLET, FILM COATED ORAL at 09:24

## 2021-04-17 RX ADMIN — ACETAMINOPHEN 1000 MG: 500 TABLET ORAL at 13:02

## 2021-04-17 RX ADMIN — METHOCARBAMOL TABLETS 750 MG: 750 TABLET, COATED ORAL at 06:28

## 2021-04-17 RX ADMIN — METHOCARBAMOL TABLETS 750 MG: 750 TABLET, COATED ORAL at 17:51

## 2021-04-17 RX ADMIN — PIPERACILLIN AND TAZOBACTAM 4500 MG: 4; .5 INJECTION, POWDER, LYOPHILIZED, FOR SOLUTION INTRAVENOUS; PARENTERAL at 13:03

## 2021-04-17 RX ADMIN — ACETAMINOPHEN 1000 MG: 500 TABLET ORAL at 05:12

## 2021-04-17 RX ADMIN — GABAPENTIN 900 MG: 300 CAPSULE ORAL at 01:00

## 2021-04-17 RX ADMIN — GABAPENTIN 900 MG: 300 CAPSULE ORAL at 17:30

## 2021-04-17 RX ADMIN — GABAPENTIN 900 MG: 300 CAPSULE ORAL at 09:23

## 2021-04-17 ASSESSMENT — PAIN DESCRIPTION - PROGRESSION
CLINICAL_PROGRESSION: NOT CHANGED
CLINICAL_PROGRESSION: NOT CHANGED

## 2021-04-17 ASSESSMENT — PAIN SCALES - GENERAL
PAINLEVEL_OUTOF10: 8
PAINLEVEL_OUTOF10: 8

## 2021-04-17 ASSESSMENT — PAIN DESCRIPTION - DESCRIPTORS
DESCRIPTORS: ACHING;BURNING
DESCRIPTORS: ACHING;HEAVINESS

## 2021-04-17 ASSESSMENT — PAIN DESCRIPTION - ORIENTATION
ORIENTATION: LEFT
ORIENTATION: LEFT

## 2021-04-17 ASSESSMENT — PAIN DESCRIPTION - LOCATION: LOCATION: ARM

## 2021-04-17 ASSESSMENT — PAIN DESCRIPTION - ONSET: ONSET: ON-GOING

## 2021-04-17 ASSESSMENT — PAIN DESCRIPTION - FREQUENCY: FREQUENCY: CONTINUOUS

## 2021-04-17 NOTE — PROGRESS NOTES
Q4 hour dressing change: Dressing on donor site (left thigh) taken down, cleansed with saline and saline soaked fluffs applied overtop the Xeroform. Kerlix applied to secure fluffs in place.
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
## 2021-04-17 NOTE — PLAN OF CARE
Problem: Pain:  Goal: Pain level will decrease  Description: Pain level will decrease  4/17/2021 0500 by Randal Ricci RN  Outcome: Ongoing  4/16/2021 1720 by Grace Martin RN  Outcome: Ongoing  Goal: Control of acute pain  Description: Control of acute pain  4/17/2021 0500 by Randal Ricci RN  Outcome: Ongoing  4/16/2021 1720 by Grace Martin RN  Outcome: Ongoing  Goal: Control of chronic pain  Description: Control of chronic pain  4/17/2021 0500 by Randal Ricci RN  Outcome: Ongoing  4/16/2021 1720 by Grace Martin RN  Outcome: Ongoing     Problem: Nutrition  Goal: Optimal nutrition therapy  4/17/2021 0500 by Randal Ricci RN  Outcome: Ongoing  4/16/2021 1720 by Grace Martin RN  Outcome: Ongoing     Problem: Skin Integrity:  Goal: Will show no infection signs and symptoms  Description: Will show no infection signs and symptoms  4/17/2021 0500 by Randal Ricci RN  Outcome: Ongoing  4/16/2021 1720 by Grace Martin RN  Outcome: Ongoing  Goal: Absence of new skin breakdown  Description: Absence of new skin breakdown  4/17/2021 0500 by Randal Ricci RN  Outcome: Ongoing  4/16/2021 1720 by Grace Martin RN  Outcome: Ongoing     Problem: Suicide risk  Goal: Provide patient with safe environment  Description: Provide patient with safe environment  4/17/2021 0500 by Randal Ricci RN  Outcome: Ongoing  4/16/2021 1720 by Grace Martin RN  Outcome: Ongoing

## 2021-04-17 NOTE — PLAN OF CARE
Problem: Pain:  Goal: Pain level will decrease  Description: Pain level will decrease  4/17/2021 1739 by Jelly Chow RN  Outcome: Ongoing  4/17/2021 0500 by Camila Paul RN  Outcome: Ongoing  Goal: Control of acute pain  Description: Control of acute pain  4/17/2021 1739 by Jelly Chow RN  Outcome: Ongoing  4/17/2021 0500 by Camila Paul RN  Outcome: Ongoing  Goal: Control of chronic pain  Description: Control of chronic pain  4/17/2021 1739 by Jelly Chow RN  Outcome: Ongoing  4/17/2021 0500 by Camila Paul RN  Outcome: Ongoing     Problem: Nutrition  Goal: Optimal nutrition therapy  4/17/2021 1739 by Jelly Chow RN  Outcome: Ongoing  4/17/2021 0500 by Camila Paul RN  Outcome: Ongoing     Problem: Skin Integrity:  Goal: Will show no infection signs and symptoms  Description: Will show no infection signs and symptoms  4/17/2021 1739 by Jelly Chow RN  Outcome: Ongoing  4/17/2021 0500 by Camila Paul RN  Outcome: Ongoing  Goal: Absence of new skin breakdown  Description: Absence of new skin breakdown  4/17/2021 1739 by Jelly Chow RN  Outcome: Ongoing  4/17/2021 0500 by Camila Paul RN  Outcome: Ongoing     Problem: Suicide risk  Goal: Provide patient with safe environment  Description: Provide patient with safe environment  4/17/2021 1739 by Jelly Chow RN  Outcome: Ongoing  4/17/2021 0500 by Camila Paul RN  Outcome: Ongoing

## 2021-04-18 LAB
PARTIAL THROMBOPLASTIN TIME: 62.1 SEC (ref 20.5–30.5)
PARTIAL THROMBOPLASTIN TIME: 62.6 SEC (ref 20.5–30.5)
PARTIAL THROMBOPLASTIN TIME: 80.5 SEC (ref 20.5–30.5)

## 2021-04-18 PROCEDURE — 6370000000 HC RX 637 (ALT 250 FOR IP): Performed by: PLASTIC SURGERY

## 2021-04-18 PROCEDURE — 76937 US GUIDE VASCULAR ACCESS: CPT

## 2021-04-18 PROCEDURE — 6360000002 HC RX W HCPCS: Performed by: PLASTIC SURGERY

## 2021-04-18 PROCEDURE — 1200000000 HC SEMI PRIVATE

## 2021-04-18 PROCEDURE — 85730 THROMBOPLASTIN TIME PARTIAL: CPT

## 2021-04-18 PROCEDURE — 2580000003 HC RX 258: Performed by: PLASTIC SURGERY

## 2021-04-18 PROCEDURE — 36415 COLL VENOUS BLD VENIPUNCTURE: CPT

## 2021-04-18 RX ORDER — LIDOCAINE 4 G/G
1 PATCH TOPICAL DAILY
Status: DISCONTINUED | OUTPATIENT
Start: 2021-04-19 | End: 2021-04-19

## 2021-04-18 RX ADMIN — ACETAMINOPHEN 1000 MG: 500 TABLET ORAL at 20:28

## 2021-04-18 RX ADMIN — Medication 6 MG: at 00:16

## 2021-04-18 RX ADMIN — ACETAMINOPHEN 1000 MG: 500 TABLET ORAL at 13:31

## 2021-04-18 RX ADMIN — METHOCARBAMOL TABLETS 750 MG: 750 TABLET, COATED ORAL at 13:32

## 2021-04-18 RX ADMIN — FAMOTIDINE 20 MG: 20 TABLET, FILM COATED ORAL at 08:08

## 2021-04-18 RX ADMIN — OXYCODONE HYDROCHLORIDE 5 MG: 5 TABLET ORAL at 02:08

## 2021-04-18 RX ADMIN — OXYCODONE HYDROCHLORIDE 5 MG: 5 TABLET ORAL at 20:28

## 2021-04-18 RX ADMIN — OXYCODONE HYDROCHLORIDE 5 MG: 5 TABLET ORAL at 14:13

## 2021-04-18 RX ADMIN — PIPERACILLIN AND TAZOBACTAM 4500 MG: 4; .5 INJECTION, POWDER, LYOPHILIZED, FOR SOLUTION INTRAVENOUS; PARENTERAL at 20:28

## 2021-04-18 RX ADMIN — GABAPENTIN 900 MG: 300 CAPSULE ORAL at 08:08

## 2021-04-18 RX ADMIN — PIPERACILLIN AND TAZOBACTAM 4500 MG: 4; .5 INJECTION, POWDER, LYOPHILIZED, FOR SOLUTION INTRAVENOUS; PARENTERAL at 13:31

## 2021-04-18 RX ADMIN — METHOCARBAMOL TABLETS 750 MG: 750 TABLET, COATED ORAL at 00:07

## 2021-04-18 RX ADMIN — ACETAMINOPHEN 1000 MG: 500 TABLET ORAL at 05:30

## 2021-04-18 RX ADMIN — HEPARIN SODIUM AND DEXTROSE 37.34 UNITS/KG/HR: 10000; 5 INJECTION INTRAVENOUS at 02:09

## 2021-04-18 RX ADMIN — PIPERACILLIN AND TAZOBACTAM 4500 MG: 4; .5 INJECTION, POWDER, LYOPHILIZED, FOR SOLUTION INTRAVENOUS; PARENTERAL at 05:30

## 2021-04-18 RX ADMIN — GABAPENTIN 900 MG: 300 CAPSULE ORAL at 00:17

## 2021-04-18 RX ADMIN — METHOCARBAMOL TABLETS 750 MG: 750 TABLET, COATED ORAL at 05:30

## 2021-04-18 RX ADMIN — FAMOTIDINE 20 MG: 20 TABLET, FILM COATED ORAL at 20:28

## 2021-04-18 RX ADMIN — HEPARIN SODIUM AND DEXTROSE 35.34 UNITS/KG/HR: 10000; 5 INJECTION INTRAVENOUS at 14:15

## 2021-04-18 RX ADMIN — HYDROXYCHLOROQUINE SULFATE 200 MG: 200 TABLET, FILM COATED ORAL at 08:08

## 2021-04-18 RX ADMIN — OXYCODONE HYDROCHLORIDE 5 MG: 5 TABLET ORAL at 08:08

## 2021-04-18 RX ADMIN — GABAPENTIN 900 MG: 300 CAPSULE ORAL at 20:28

## 2021-04-18 ASSESSMENT — PAIN DESCRIPTION - FREQUENCY: FREQUENCY: CONTINUOUS

## 2021-04-18 ASSESSMENT — PAIN DESCRIPTION - PROGRESSION: CLINICAL_PROGRESSION: NOT CHANGED

## 2021-04-18 ASSESSMENT — PAIN SCALES - GENERAL
PAINLEVEL_OUTOF10: 4
PAINLEVEL_OUTOF10: 8

## 2021-04-18 ASSESSMENT — PAIN DESCRIPTION - PAIN TYPE: TYPE: SURGICAL PAIN

## 2021-04-18 ASSESSMENT — PAIN DESCRIPTION - ONSET: ONSET: ON-GOING
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
## 2021-04-18 NOTE — PLAN OF CARE
Problem: Pain:  Goal: Pain level will decrease  Description: Pain level will decrease  4/18/2021 1758 by Aggie Begum RN  Outcome: Ongoing  4/18/2021 0541 by Jacqui Mcginnis RN  Outcome: Ongoing  Goal: Control of acute pain  Description: Control of acute pain  4/18/2021 1758 by Aggie Begum RN  Outcome: Ongoing  4/18/2021 0541 by Jacqui Mcginnis RN  Outcome: Ongoing  Goal: Control of chronic pain  Description: Control of chronic pain  4/18/2021 1758 by Aggie Begum RN  Outcome: Ongoing  4/18/2021 0541 by Jacqui Mcginnis RN  Outcome: Ongoing     Problem: Nutrition  Goal: Optimal nutrition therapy  4/18/2021 1758 by Aggie Begum RN  Outcome: Ongoing  4/18/2021 0541 by Jacqui Mcginnis RN  Outcome: Ongoing     Problem: Skin Integrity:  Goal: Will show no infection signs and symptoms  Description: Will show no infection signs and symptoms  4/18/2021 1758 by Aggie Begum RN  Outcome: Ongoing  4/18/2021 0541 by Jacqui Mcginnis RN  Outcome: Ongoing  Goal: Absence of new skin breakdown  Description: Absence of new skin breakdown  4/18/2021 1758 by Aggie Begum RN  Outcome: Ongoing  4/18/2021 0541 by Jacqui Mcginnis RN  Outcome: Ongoing     Problem: Suicide risk  Goal: Provide patient with safe environment  Description: Provide patient with safe environment  4/18/2021 1758 by Aggie Begum RN  Outcome: Ongoing  4/18/2021 0541 by Jacqui Mcginnis RN  Outcome: Ongoing

## 2021-04-19 ENCOUNTER — APPOINTMENT (OUTPATIENT)
Dept: MRI IMAGING | Age: 41
DRG: 904 | End: 2021-04-19
Payer: COMMERCIAL

## 2021-04-19 PROBLEM — F31.2 SEVERE MANIC BIPOLAR I DISORDER WITH PSYCHOTIC FEATURES (HCC): Status: ACTIVE | Noted: 2021-04-19

## 2021-04-19 LAB — PARTIAL THROMBOPLASTIN TIME: 75 SEC (ref 20.5–30.5)

## 2021-04-19 PROCEDURE — 6370000000 HC RX 637 (ALT 250 FOR IP): Performed by: PLASTIC SURGERY

## 2021-04-19 PROCEDURE — 2580000003 HC RX 258: Performed by: PLASTIC SURGERY

## 2021-04-19 PROCEDURE — 85730 THROMBOPLASTIN TIME PARTIAL: CPT

## 2021-04-19 PROCEDURE — 1200000000 HC SEMI PRIVATE

## 2021-04-19 PROCEDURE — 99232 SBSQ HOSP IP/OBS MODERATE 35: CPT | Performed by: PSYCHIATRY & NEUROLOGY

## 2021-04-19 PROCEDURE — 6360000002 HC RX W HCPCS: Performed by: PLASTIC SURGERY

## 2021-04-19 PROCEDURE — 6370000000 HC RX 637 (ALT 250 FOR IP): Performed by: NURSE PRACTITIONER

## 2021-04-19 PROCEDURE — 36415 COLL VENOUS BLD VENIPUNCTURE: CPT

## 2021-04-19 PROCEDURE — 90836 PSYTX W PT W E/M 45 MIN: CPT | Performed by: PSYCHIATRY & NEUROLOGY

## 2021-04-19 PROCEDURE — 6370000000 HC RX 637 (ALT 250 FOR IP): Performed by: STUDENT IN AN ORGANIZED HEALTH CARE EDUCATION/TRAINING PROGRAM

## 2021-04-19 RX ORDER — LIDOCAINE 4 G/G
1 PATCH TOPICAL DAILY
Status: DISCONTINUED | OUTPATIENT
Start: 2021-04-20 | End: 2021-04-22 | Stop reason: HOSPADM

## 2021-04-19 RX ORDER — CYCLOBENZAPRINE HCL 10 MG
10 TABLET ORAL ONCE
Status: COMPLETED | OUTPATIENT
Start: 2021-04-19 | End: 2021-04-19

## 2021-04-19 RX ORDER — OXYCODONE HYDROCHLORIDE 5 MG/1
5 TABLET ORAL EVERY 8 HOURS PRN
Status: DISCONTINUED | OUTPATIENT
Start: 2021-04-19 | End: 2021-04-20

## 2021-04-19 RX ORDER — FAMOTIDINE 20 MG/1
10 TABLET, FILM COATED ORAL 2 TIMES DAILY PRN
Status: DISCONTINUED | OUTPATIENT
Start: 2021-04-19 | End: 2021-04-22 | Stop reason: HOSPADM

## 2021-04-19 RX ORDER — IBUPROFEN 400 MG/1
400 TABLET ORAL EVERY 6 HOURS
Status: DISCONTINUED | OUTPATIENT
Start: 2021-04-19 | End: 2021-04-20

## 2021-04-19 RX ADMIN — Medication 6 MG: at 00:06

## 2021-04-19 RX ADMIN — HEPARIN SODIUM AND DEXTROSE 35.34 UNITS/KG/HR: 10000; 5 INJECTION INTRAVENOUS at 02:23

## 2021-04-19 RX ADMIN — HYDROXYCHLOROQUINE SULFATE 200 MG: 200 TABLET, FILM COATED ORAL at 08:41

## 2021-04-19 RX ADMIN — OXYCODONE HYDROCHLORIDE 5 MG: 5 TABLET ORAL at 02:22

## 2021-04-19 RX ADMIN — OXYCODONE HYDROCHLORIDE 5 MG: 5 TABLET ORAL at 08:46

## 2021-04-19 RX ADMIN — IBUPROFEN 400 MG: 400 TABLET, FILM COATED ORAL at 08:41

## 2021-04-19 RX ADMIN — METHOCARBAMOL TABLETS 750 MG: 750 TABLET, COATED ORAL at 00:06

## 2021-04-19 RX ADMIN — FAMOTIDINE 10 MG: 20 TABLET, FILM COATED ORAL at 08:41

## 2021-04-19 RX ADMIN — ACETAMINOPHEN 1000 MG: 500 TABLET ORAL at 05:01

## 2021-04-19 RX ADMIN — APIXABAN 10 MG: 5 TABLET, FILM COATED ORAL at 21:25

## 2021-04-19 RX ADMIN — PIPERACILLIN AND TAZOBACTAM 4500 MG: 4; .5 INJECTION, POWDER, LYOPHILIZED, FOR SOLUTION INTRAVENOUS; PARENTERAL at 05:01

## 2021-04-19 RX ADMIN — APIXABAN 10 MG: 5 TABLET, FILM COATED ORAL at 08:46

## 2021-04-19 RX ADMIN — ACETAMINOPHEN 1000 MG: 500 TABLET ORAL at 13:12

## 2021-04-19 RX ADMIN — CYCLOBENZAPRINE 10 MG: 10 TABLET, FILM COATED ORAL at 10:36

## 2021-04-19 RX ADMIN — GABAPENTIN 900 MG: 300 CAPSULE ORAL at 05:02

## 2021-04-19 RX ADMIN — IBUPROFEN 400 MG: 400 TABLET, FILM COATED ORAL at 22:11

## 2021-04-19 RX ADMIN — IBUPROFEN 400 MG: 400 TABLET, FILM COATED ORAL at 00:06

## 2021-04-19 RX ADMIN — GABAPENTIN 900 MG: 300 CAPSULE ORAL at 13:12

## 2021-04-19 RX ADMIN — ACETAMINOPHEN 1000 MG: 500 TABLET ORAL at 20:09

## 2021-04-19 RX ADMIN — Medication 6 MG: at 22:11

## 2021-04-19 RX ADMIN — OXYCODONE HYDROCHLORIDE 5 MG: 5 TABLET ORAL at 16:22

## 2021-04-19 RX ADMIN — IBUPROFEN 400 MG: 400 TABLET, FILM COATED ORAL at 16:22

## 2021-04-19 RX ADMIN — SODIUM CHLORIDE, PRESERVATIVE FREE 10 ML: 5 INJECTION INTRAVENOUS at 22:13

## 2021-04-19 RX ADMIN — METHOCARBAMOL TABLETS 750 MG: 750 TABLET, COATED ORAL at 05:02

## 2021-04-19 RX ADMIN — GABAPENTIN 900 MG: 300 CAPSULE ORAL at 20:09

## 2021-04-19 ASSESSMENT — PAIN DESCRIPTION - PROGRESSION: CLINICAL_PROGRESSION: NOT CHANGED

## 2021-04-19 ASSESSMENT — PAIN DESCRIPTION - PAIN TYPE: TYPE: SURGICAL PAIN

## 2021-04-19 ASSESSMENT — PAIN DESCRIPTION - LOCATION: LOCATION: ARM

## 2021-04-19 ASSESSMENT — PAIN SCALES - GENERAL
PAINLEVEL_OUTOF10: 7
PAINLEVEL_OUTOF10: 5
PAINLEVEL_OUTOF10: 8
PAINLEVEL_OUTOF10: 7
PAINLEVEL_OUTOF10: 8
PAINLEVEL_OUTOF10: 8
PAINLEVEL_OUTOF10: 7

## 2021-04-19 ASSESSMENT — PAIN DESCRIPTION - ONSET: ONSET: ON-GOING

## 2021-04-19 ASSESSMENT — PAIN DESCRIPTION - FREQUENCY: FREQUENCY: CONTINUOUS

## 2021-04-19 NOTE — VIRTUAL HEALTH
only 3 hours at night and feeling well rested and energetic. He states that his mind races. His wife and mother state he also has rapid pressured speech at times. He reports increase in goal-directed activities and increased distractibility. Patient also endorses irritability. We also explored symptoms of mixed episodes. It does appear the patient has been on SSRI medications in the past and this triggered mixed episode of bipolar disorder. Patient endorsed increased suicidal, increased irritability, feeling depressed, having racing thoughts, having feelings of hopelessness, and disturbed sleep. Spent nearly 60 minutes with the patient and his family. Of that greater than 40 minutes was spent in supportive psychotherapy and education      We also discussed family history. Patient has 2 uncles who appear to have mental health problems historically. One uncle attempted suicide. Another uncle is dead and had troubled life with extensive drug use and mood problems. Ended up homeless prior to death.       Mental Status Exam  Level of consciousness:  Within normal limits  Appearance: Hospital attire, seated on the side of a hospital bed with his left arm wrapped   behavior/Motor: Some psychomotor agitation noted   attitude toward examiner:  Cooperative, attentive, intermittent eye contact  Speech:  spontaneous, somewhat pressured rate at times, normal volume and well articulated  Mood: \"Great\"  Affect: Expansive  Thought processes: Tangential but easily redirected   thought content:  denies homicidal ideation  Suicidal Ideation: Denies suicidal ideation  Delusions: Patient paranoid and also believing that he is carrying out God's work/mission in a manner that is inconsistent with his past Confucianist believes  Perceptual Disturbance:  denies any perceptual disturbance  Cognition:  Oriented to self, location, time, and situation  Memory: Seems to be impaired about some past events  Insight & Judgement: improving  Medication side effects:  denies       Data   height is 5' 8\" (1.727 m) and weight is 141 lb (64 kg). His oral temperature is 98.2 °F (36.8 °C). His blood pressure is 104/71 and his pulse is 81. His respiration is 20 and oxygen saturation is 100%. Labs:   No results displayed because visit has over 200 results. Medications  Current Facility-Administered Medications: apixaban (ELIQUIS) tablet 10 mg, 10 mg, Oral, BID  oxyCODONE (ROXICODONE) immediate release tablet 5 mg, 5 mg, Oral, Q8H PRN  ibuprofen (ADVIL;MOTRIN) tablet 400 mg, 400 mg, Oral, Q6H  famotidine (PEPCID) tablet 10 mg, 10 mg, Oral, BID PRN  calcium carbonate (TUMS) chewable tablet 500 mg, 500 mg, Oral, TID PRN  gabapentin (NEURONTIN) capsule 900 mg, 900 mg, Oral, TID  melatonin tablet 6 mg, 6 mg, Oral, Nightly  polyethylene glycol (GLYCOLAX) packet 17 g, 17 g, Oral, BID  hydroxychloroquine (PLAQUENIL) tablet 200 mg, 200 mg, Oral, Daily  sodium chloride flush 0.9 % injection 10 mL, 10 mL, Intravenous, 2 times per day  sodium chloride flush 0.9 % injection 10 mL, 10 mL, Intravenous, PRN  acetaminophen (TYLENOL) tablet 1,000 mg, 1,000 mg, Oral, 3 times per day  sennosides-docusate sodium (SENOKOT-S) 8.6-50 MG tablet 1 tablet, 1 tablet, Oral, BID    ASSESSMENT  Trauma   Severe manic bipolar 1 disorder with psychotic features    PLAN  I would recommend that the patient have head imaging. Could start with an MRI in order to ascertain whether there is any organic cause of patient's symptoms. Consult neurology for opinion of any necessary additional work-up with patient who has new onset psychosis proximate to diagnosis of systemic lupus erythematous    Pending medical clearance I would recommend the patient come over to [facility] for inpatient psychiatric stay      Electronically signed by Brenden Resendiz MD on 4/19/21 at 12:15 PM EDT    **This report has been created using voice recognition software.  It may contain minor errors which
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
## 2021-04-19 NOTE — PLAN OF CARE
Problem: Pain:  Goal: Pain level will decrease  Description: Pain level will decrease  4/18/2021 1758 by Han Butt RN  Outcome: Ongoing  Goal: Control of acute pain  Description: Control of acute pain  4/19/2021 0237 by Earl Jang RN  Outcome: Ongoing  4/18/2021 1758 by Han Butt RN  Outcome: Ongoing  Goal: Control of chronic pain  Description: Control of chronic pain  4/18/2021 1758 by Han Butt RN  Outcome: Ongoing     Problem: Nutrition  Goal: Optimal nutrition therapy  4/18/2021 1758 by Han Butt RN  Outcome: Ongoing     Problem: Skin Integrity:  Goal: Will show no infection signs and symptoms  Description: Will show no infection signs and symptoms  4/19/2021 0237 by Earl Jang RN  Outcome: Ongoing  4/18/2021 1758 by Han Butt RN  Outcome: Ongoing  Goal: Absence of new skin breakdown  Description: Absence of new skin breakdown  4/18/2021 1758 by Han Butt RN  Outcome: Ongoing     Problem: Suicide risk  Goal: Provide patient with safe environment  Description: Provide patient with safe environment  4/18/2021 1758 by Han Butt RN  Outcome: Ongoing

## 2021-04-19 NOTE — PROGRESS NOTES
Occupational Therapy    Occupational Therapy Not Seen Note    DATE: 2021  Name: Blanca Copeland  : 1980  MRN: [id number]    Patient not available for Occupational Therapy due to: Other: STSG on R UE on 21. Takedown scheduled for 21.     Next Scheduled Treatment: 2021    Electronically signed by Donta CORDERO on 2021 at 8:51 AM  Alysa NGUYỄN

## 2021-04-19 NOTE — PROGRESS NOTES
Pt was informed that he must reach out to Dr. Grace Turcios and fill out release of medical records through facility. Pt given facility contact information.

## 2021-04-19 NOTE — PROGRESS NOTES
Pt requesting to take shower. Writer reached out to on call Plastics Dr. Carolee Chris. Was informed per Dr. Carolee Chris to reach out to Dr. Shana Michel and clarify if it is acceptable. Until clarified per Dr. Shana Michel, verbally ordered to pt is to not receive showers.

## 2021-04-20 ENCOUNTER — APPOINTMENT (OUTPATIENT)
Dept: MRI IMAGING | Age: 41
DRG: 904 | End: 2021-04-20
Payer: COMMERCIAL

## 2021-04-20 PROCEDURE — 6370000000 HC RX 637 (ALT 250 FOR IP): Performed by: NURSE PRACTITIONER

## 2021-04-20 PROCEDURE — 6370000000 HC RX 637 (ALT 250 FOR IP): Performed by: PLASTIC SURGERY

## 2021-04-20 PROCEDURE — 95819 EEG AWAKE AND ASLEEP: CPT | Performed by: PSYCHIATRY & NEUROLOGY

## 2021-04-20 PROCEDURE — 70551 MRI BRAIN STEM W/O DYE: CPT

## 2021-04-20 PROCEDURE — 95714 VEEG EA 12-26 HR UNMNTR: CPT

## 2021-04-20 PROCEDURE — 1200000000 HC SEMI PRIVATE

## 2021-04-20 PROCEDURE — 95816 EEG AWAKE AND DROWSY: CPT

## 2021-04-20 PROCEDURE — 2580000003 HC RX 258: Performed by: PLASTIC SURGERY

## 2021-04-20 PROCEDURE — 95711 VEEG 2-12 HR UNMONITORED: CPT

## 2021-04-20 PROCEDURE — 99254 IP/OBS CNSLTJ NEW/EST MOD 60: CPT | Performed by: PSYCHIATRY & NEUROLOGY

## 2021-04-20 RX ORDER — OXYCODONE HYDROCHLORIDE 5 MG/1
2.5 TABLET ORAL EVERY 8 HOURS PRN
Status: COMPLETED | OUTPATIENT
Start: 2021-04-20 | End: 2021-04-21

## 2021-04-20 RX ORDER — MAGNESIUM CARB/ALUMINUM HYDROX 105-160MG
30 TABLET,CHEWABLE ORAL DAILY PRN
Status: DISCONTINUED | OUTPATIENT
Start: 2021-04-20 | End: 2021-04-22 | Stop reason: HOSPADM

## 2021-04-20 RX ORDER — IBUPROFEN 400 MG/1
400 TABLET ORAL EVERY 4 HOURS PRN
Status: DISCONTINUED | OUTPATIENT
Start: 2021-04-20 | End: 2021-04-22 | Stop reason: HOSPADM

## 2021-04-20 RX ORDER — ACETAMINOPHEN 500 MG
1000 TABLET ORAL EVERY 8 HOURS PRN
Status: DISCONTINUED | OUTPATIENT
Start: 2021-04-20 | End: 2021-04-22 | Stop reason: HOSPADM

## 2021-04-20 RX ADMIN — GABAPENTIN 900 MG: 300 CAPSULE ORAL at 06:23

## 2021-04-20 RX ADMIN — ACETAMINOPHEN 1000 MG: 500 TABLET ORAL at 06:23

## 2021-04-20 RX ADMIN — SODIUM CHLORIDE, PRESERVATIVE FREE 10 ML: 5 INJECTION INTRAVENOUS at 08:40

## 2021-04-20 RX ADMIN — Medication 6 MG: at 21:14

## 2021-04-20 RX ADMIN — HYDROXYCHLOROQUINE SULFATE 200 MG: 200 TABLET, FILM COATED ORAL at 08:40

## 2021-04-20 RX ADMIN — OXYCODONE HYDROCHLORIDE 5 MG: 5 TABLET ORAL at 00:33

## 2021-04-20 RX ADMIN — GABAPENTIN 900 MG: 300 CAPSULE ORAL at 13:14

## 2021-04-20 RX ADMIN — APIXABAN 10 MG: 5 TABLET, FILM COATED ORAL at 21:14

## 2021-04-20 RX ADMIN — OXYCODONE HYDROCHLORIDE 2.5 MG: 5 TABLET ORAL at 17:09

## 2021-04-20 RX ADMIN — APIXABAN 10 MG: 5 TABLET, FILM COATED ORAL at 08:40

## 2021-04-20 RX ADMIN — OXYCODONE HYDROCHLORIDE 2.5 MG: 5 TABLET ORAL at 08:40

## 2021-04-20 RX ADMIN — GABAPENTIN 900 MG: 300 CAPSULE ORAL at 21:14

## 2021-04-20 RX ADMIN — SODIUM CHLORIDE, PRESERVATIVE FREE 10 ML: 5 INJECTION INTRAVENOUS at 21:14

## 2021-04-20 ASSESSMENT — PAIN SCALES - GENERAL
PAINLEVEL_OUTOF10: 7
PAINLEVEL_OUTOF10: 7
PAINLEVEL_OUTOF10: 9

## 2021-04-20 ASSESSMENT — PAIN DESCRIPTION - PAIN TYPE: TYPE: SURGICAL PAIN

## 2021-04-20 ASSESSMENT — PAIN DESCRIPTION - ORIENTATION: ORIENTATION: LEFT

## 2021-04-20 ASSESSMENT — PAIN DESCRIPTION - DIRECTION: RADIATING_TOWARDS: L FOREARM

## 2021-04-20 ASSESSMENT — PAIN DESCRIPTION - ONSET: ONSET: ON-GOING

## 2021-04-20 ASSESSMENT — PAIN DESCRIPTION - DESCRIPTORS: DESCRIPTORS: SHOOTING;NUMBNESS;ACHING

## 2021-04-20 ASSESSMENT — PAIN DESCRIPTION - FREQUENCY: FREQUENCY: CONTINUOUS
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
## 2021-04-20 NOTE — PLAN OF CARE
Problem: Pain:  Goal: Pain level will decrease  Description: Pain level will decrease  4/20/2021 0219 by Mariza Jacques RN  Outcome: Ongoing  4/19/2021 1809 by Ema Estrada RN  Outcome: Ongoing  Goal: Control of acute pain  Description: Control of acute pain  4/20/2021 0219 by Mariza Jacques RN  Outcome: Ongoing  4/19/2021 1809 by Ema Estrada RN  Outcome: Ongoing  Goal: Control of chronic pain  Description: Control of chronic pain  4/20/2021 0219 by Mariza Jacques RN  Outcome: Ongoing  4/19/2021 1809 by Ema Estrada RN  Outcome: Ongoing     Problem: Nutrition  Goal: Optimal nutrition therapy  4/20/2021 0219 by Mariza Jacques RN  Outcome: Ongoing  4/19/2021 1809 by Ema Estrada RN  Outcome: Ongoing     Problem: Skin Integrity:  Goal: Will show no infection signs and symptoms  Description: Will show no infection signs and symptoms  4/20/2021 0219 by Mariza Jacques RN  Outcome: Ongoing  4/19/2021 1809 by Ema Estrada RN  Outcome: Ongoing  Goal: Absence of new skin breakdown  Description: Absence of new skin breakdown  4/20/2021 0219 by Mariza Jacques RN  Outcome: Ongoing  4/19/2021 1809 by Ema Estrada RN  Outcome: Ongoing     Problem: Suicide risk  Goal: Provide patient with safe environment  Description: Provide patient with safe environment  4/20/2021 0219 by Mariza Jacques RN  Outcome: Ongoing  4/19/2021 1809 by Ema Estrada RN  Outcome: Ongoing
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
## 2021-04-20 NOTE — PLAN OF CARE
Problem: Pain:  Goal: Pain level will decrease  Description: Pain level will decrease  4/20/2021 1940 by Hanane Nayak RN  Outcome: Ongoing  4/20/2021 1723 by Hanane Nayak RN  Outcome: Ongoing  Goal: Control of acute pain  Description: Control of acute pain  4/20/2021 1940 by Hanane Nayak RN  Outcome: Ongoing  4/20/2021 1723 by Hanane Nayak RN  Outcome: Ongoing  Goal: Control of chronic pain  Description: Control of chronic pain  4/20/2021 1940 by Hanane Nayak RN  Outcome: Ongoing  4/20/2021 1723 by Hanane Nayak RN  Outcome: Ongoing     Problem: Nutrition  Goal: Optimal nutrition therapy  4/20/2021 1940 by Hanane Nayak RN  Outcome: Ongoing  4/20/2021 1723 by Hanane Nayak RN  Outcome: Ongoing     Problem: Skin Integrity:  Goal: Will show no infection signs and symptoms  Description: Will show no infection signs and symptoms  4/20/2021 1940 by Hanane Nayak RN  Outcome: Ongoing  4/20/2021 1723 by Hanane Nayak RN  Outcome: Ongoing  Goal: Absence of new skin breakdown  Description: Absence of new skin breakdown  4/20/2021 1940 by Hanane Nayak RN  Outcome: Ongoing  4/20/2021 1723 by Hanane Nayak RN  Outcome: Ongoing     Problem: Suicide risk  Goal: Provide patient with safe environment  Description: Provide patient with safe environment  4/20/2021 1940 by Hanane Nayak RN  Outcome: Ongoing  4/20/2021 1723 by Hanane Nayak RN  Outcome: Ongoing
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
## 2021-04-20 NOTE — CARE COORDINATION
Transitional planning. Take down scheduled for tomorrow and MRI of head per BHI request. Probable BHI tomorrow.

## 2021-04-21 LAB
SARS-COV-2, RAPID: NOT DETECTED
SPECIMEN DESCRIPTION: NORMAL

## 2021-04-21 PROCEDURE — 6370000000 HC RX 637 (ALT 250 FOR IP): Performed by: NURSE PRACTITIONER

## 2021-04-21 PROCEDURE — 87635 SARS-COV-2 COVID-19 AMP PRB: CPT

## 2021-04-21 PROCEDURE — 6370000000 HC RX 637 (ALT 250 FOR IP): Performed by: PLASTIC SURGERY

## 2021-04-21 PROCEDURE — 1200000000 HC SEMI PRIVATE

## 2021-04-21 PROCEDURE — 2580000003 HC RX 258: Performed by: PLASTIC SURGERY

## 2021-04-21 PROCEDURE — 99232 SBSQ HOSP IP/OBS MODERATE 35: CPT | Performed by: PSYCHIATRY & NEUROLOGY

## 2021-04-21 RX ORDER — IBUPROFEN 400 MG/1
400 TABLET ORAL EVERY 4 HOURS PRN
Qty: 42 TABLET | Refills: 0 | Status: ON HOLD | DISCHARGE
Start: 2021-04-21 | End: 2021-04-30 | Stop reason: HOSPADM

## 2021-04-21 RX ORDER — GABAPENTIN 300 MG/1
900 CAPSULE ORAL 3 TIMES DAILY
Qty: 45 CAPSULE | Refills: 0 | Status: ON HOLD | DISCHARGE
Start: 2021-04-21 | End: 2021-04-30

## 2021-04-21 RX ORDER — HYDROXYCHLOROQUINE SULFATE 200 MG/1
200 TABLET, FILM COATED ORAL DAILY
Qty: 7 TABLET | Refills: 0 | Status: ON HOLD | DISCHARGE
Start: 2021-04-21 | End: 2021-04-30 | Stop reason: HOSPADM

## 2021-04-21 RX ORDER — GINSENG 100 MG
CAPSULE ORAL
Qty: 1 TUBE | Refills: 3 | Status: ON HOLD | OUTPATIENT
Start: 2021-04-21 | End: 2021-04-30 | Stop reason: SDUPTHER

## 2021-04-21 RX ORDER — LIDOCAINE 4 G/G
1 PATCH TOPICAL DAILY
Qty: 7 PATCH | Status: ON HOLD | DISCHARGE
Start: 2021-04-21 | End: 2021-04-30 | Stop reason: HOSPADM

## 2021-04-21 RX ORDER — LANOLIN ALCOHOL/MO/W.PET/CERES
6 CREAM (GRAM) TOPICAL NIGHTLY
Qty: 14 TABLET | Refills: 0 | Status: ON HOLD | DISCHARGE
Start: 2021-04-21 | End: 2021-04-30 | Stop reason: HOSPADM

## 2021-04-21 RX ORDER — GINSENG 100 MG
CAPSULE ORAL 3 TIMES DAILY
Status: DISCONTINUED | OUTPATIENT
Start: 2021-04-21 | End: 2021-04-22 | Stop reason: HOSPADM

## 2021-04-21 RX ORDER — OXYCODONE HYDROCHLORIDE 5 MG/1
5 TABLET ORAL ONCE
Status: COMPLETED | OUTPATIENT
Start: 2021-04-21 | End: 2021-04-21

## 2021-04-21 RX ADMIN — APIXABAN 10 MG: 5 TABLET, FILM COATED ORAL at 22:15

## 2021-04-21 RX ADMIN — SODIUM CHLORIDE, PRESERVATIVE FREE 10 ML: 5 INJECTION INTRAVENOUS at 22:15

## 2021-04-21 RX ADMIN — APIXABAN 10 MG: 5 TABLET, FILM COATED ORAL at 08:13

## 2021-04-21 RX ADMIN — GABAPENTIN 900 MG: 300 CAPSULE ORAL at 11:59

## 2021-04-21 RX ADMIN — Medication 6 MG: at 22:14

## 2021-04-21 RX ADMIN — GABAPENTIN 900 MG: 300 CAPSULE ORAL at 04:30

## 2021-04-21 RX ADMIN — HYDROXYCHLOROQUINE SULFATE 200 MG: 200 TABLET, FILM COATED ORAL at 08:13

## 2021-04-21 RX ADMIN — OXYCODONE HYDROCHLORIDE 5 MG: 5 TABLET ORAL at 11:59

## 2021-04-21 RX ADMIN — OXYCODONE HYDROCHLORIDE 2.5 MG: 5 TABLET ORAL at 04:32

## 2021-04-21 RX ADMIN — SODIUM CHLORIDE, PRESERVATIVE FREE 10 ML: 5 INJECTION INTRAVENOUS at 09:42

## 2021-04-21 RX ADMIN — GABAPENTIN 900 MG: 300 CAPSULE ORAL at 22:14
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
## 2021-04-21 NOTE — PROCEDURES
[address]                  [facility] 30                          ELECTROENCEPHALOGRAM REPORT    PATIENT NAME: Ethel Lagos                       :        1980  MED REC NO:   [id number]                             ROOM:       6048  ACCOUNT NO:   [de-identified]                           ADMIT DATE: 2021  PROVIDER:     Nuria Land MD    DATE OF EE2021    ATTENDING OF RECORD:  Ajith William MD    REASON FOR STUDY:  This is a 72-year-old patient with psychosis. MEDICATIONS:  Include Roxicodone, Eliquis, Plaquenil. This is a 16-channel EEG and one EKG channel recording performed on a  patient described to be awake, drowsy, and asleep. The patient shows  normal waking rhythms. Background activity consists of well-regulated 9  Hz activity in a 40-60 microvolt range, more prominent over the  posterior head area, showing good reactivity to eye opening and closing. Over the anterior head regions, there are 15-20 Hz activity in a 20-30  microvolt range. With drowsiness and sleep, there is further intrusion  of slower frequencies in a theta and lesser degree a delta band,  accompanied by vertex wave activity and sleep spindles. This record is  not lateralized or epileptiform. Hyperventilation and photic  stimulation are not performed. IMPRESSION:  This EEG is within normal limits for an awake, drowsy, and  sleepy patient. No lateralized or epileptiform disturbance is seen.         Shalini Calero MD    D: 2021 21:24:19       T: 2021 22:32:39     [email or web address]  Job#: [id number]     Doc#: [id number]    CC:

## 2021-04-21 NOTE — CARE COORDINATION
Transitional planning:    [facility] at [phone] and spoke with RN. Inquired as to whether they can accommodate dressings while patient is admitted. RN states they are able to do the dressing changes. Pt needs a negative covid test prior to initiating call for admission to [facility]. Physician must also document in pt chart that he is medically cleared to go to [facility]. Spoke with Quan Luis RN in burn and relayed information. [facility] served Yash Bhagat NP to ask about documentation stating patient is medically cleared to go to [facility]    1750 - met with patient regarding signing voluntary paperwork for BHI placement. Pt states \"I'm not trying to get out of going but I still have questions before I am sent over there\". Pt may need heat lamp for skin graft site, also requires dressing changes as well. Called [facility] to inquire as to whether they can accommodate a heat lamp. RN states that she should be able to get a heat lamp from medical but unsure if they have the staff available to help. She states that she will look into heat lamp and call back. Call back info left. Updated RN that rapid covid results are negative    1815 -  RN from [facility] calls back and states that she is continuing to look into patient needs.  She is given number for burn should she have any additional questions regarding patient
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
## 2021-04-21 NOTE — PROGRESS NOTES
Dr. Miah Fletcher at bedside to assist with takedown of graft dressing. Site cleansed with saline, staples and sutures removed. Site redressed with Bacitracin, oil emulsion dressing, gauze, and Kerlix.

## 2021-04-22 ENCOUNTER — HOSPITAL ENCOUNTER (INPATIENT)
Age: 41
LOS: 8 days | Discharge: HOME | DRG: 885 | End: 2021-04-30
Attending: PSYCHIATRY & NEUROLOGY | Admitting: PSYCHIATRY & NEUROLOGY
Payer: COMMERCIAL

## 2021-04-22 VITALS
BODY MASS INDEX: 21.37 KG/M2 | DIASTOLIC BLOOD PRESSURE: 69 MMHG | WEIGHT: 141 LBS | RESPIRATION RATE: 16 BRPM | HEIGHT: 68 IN | SYSTOLIC BLOOD PRESSURE: 107 MMHG | HEART RATE: 98 BPM | TEMPERATURE: 98.6 F | OXYGEN SATURATION: 100 %

## 2021-04-22 DIAGNOSIS — S54.12XD LACERATION OF LEFT MEDIAN NERVE, SUBSEQUENT ENCOUNTER: Primary | ICD-10-CM

## 2021-04-22 PROCEDURE — 99232 SBSQ HOSP IP/OBS MODERATE 35: CPT | Performed by: PSYCHIATRY & NEUROLOGY

## 2021-04-22 PROCEDURE — 1240000000 HC EMOTIONAL WELLNESS R&B

## 2021-04-22 PROCEDURE — 6370000000 HC RX 637 (ALT 250 FOR IP): Performed by: PLASTIC SURGERY

## 2021-04-22 PROCEDURE — 97535 SELF CARE MNGMENT TRAINING: CPT

## 2021-04-22 PROCEDURE — 6370000000 HC RX 637 (ALT 250 FOR IP): Performed by: PSYCHIATRY & NEUROLOGY

## 2021-04-22 PROCEDURE — 6370000000 HC RX 637 (ALT 250 FOR IP): Performed by: NURSE PRACTITIONER

## 2021-04-22 RX ORDER — ACETAMINOPHEN 325 MG/1
650 TABLET ORAL EVERY 4 HOURS PRN
Status: DISCONTINUED | OUTPATIENT
Start: 2021-04-22 | End: 2021-04-30 | Stop reason: HOSPADM

## 2021-04-22 RX ORDER — IBUPROFEN 400 MG/1
400 TABLET ORAL EVERY 6 HOURS PRN
Status: DISCONTINUED | OUTPATIENT
Start: 2021-04-22 | End: 2021-04-30 | Stop reason: HOSPADM

## 2021-04-22 RX ORDER — HALOPERIDOL 5 MG/ML
5 INJECTION INTRAMUSCULAR EVERY 4 HOURS PRN
Status: DISCONTINUED | OUTPATIENT
Start: 2021-04-22 | End: 2021-04-30 | Stop reason: HOSPADM

## 2021-04-22 RX ORDER — LORAZEPAM 2 MG/ML
2 INJECTION INTRAMUSCULAR EVERY 4 HOURS PRN
Status: DISCONTINUED | OUTPATIENT
Start: 2021-04-22 | End: 2021-04-30 | Stop reason: HOSPADM

## 2021-04-22 RX ORDER — LORAZEPAM 1 MG/1
2 TABLET ORAL EVERY 4 HOURS PRN
Status: DISCONTINUED | OUTPATIENT
Start: 2021-04-22 | End: 2021-04-30 | Stop reason: HOSPADM

## 2021-04-22 RX ORDER — GABAPENTIN 300 MG/1
900 CAPSULE ORAL 3 TIMES DAILY
Status: DISCONTINUED | OUTPATIENT
Start: 2021-04-22 | End: 2021-04-30 | Stop reason: HOSPADM

## 2021-04-22 RX ORDER — HYDROXYCHLOROQUINE SULFATE 200 MG/1
200 TABLET, FILM COATED ORAL DAILY
Status: DISCONTINUED | OUTPATIENT
Start: 2021-04-23 | End: 2021-04-30 | Stop reason: HOSPADM

## 2021-04-22 RX ORDER — HALOPERIDOL 10 MG/1
5 TABLET ORAL EVERY 4 HOURS PRN
Status: DISCONTINUED | OUTPATIENT
Start: 2021-04-22 | End: 2021-04-30 | Stop reason: HOSPADM

## 2021-04-22 RX ORDER — DIPHENHYDRAMINE HYDROCHLORIDE 50 MG/ML
50 INJECTION INTRAMUSCULAR; INTRAVENOUS EVERY 4 HOURS PRN
Status: DISCONTINUED | OUTPATIENT
Start: 2021-04-22 | End: 2021-04-30 | Stop reason: HOSPADM

## 2021-04-22 RX ORDER — NICOTINE 21 MG/24HR
1 PATCH, TRANSDERMAL 24 HOURS TRANSDERMAL DAILY
Status: DISCONTINUED | OUTPATIENT
Start: 2021-04-22 | End: 2021-04-22

## 2021-04-22 RX ORDER — TRAZODONE HYDROCHLORIDE 50 MG/1
50 TABLET ORAL NIGHTLY PRN
Status: DISCONTINUED | OUTPATIENT
Start: 2021-04-22 | End: 2021-04-30 | Stop reason: HOSPADM

## 2021-04-22 RX ORDER — POLYETHYLENE GLYCOL 3350 17 G/17G
17 POWDER, FOR SOLUTION ORAL DAILY PRN
Status: DISCONTINUED | OUTPATIENT
Start: 2021-04-22 | End: 2021-04-30 | Stop reason: HOSPADM

## 2021-04-22 RX ORDER — MAGNESIUM HYDROXIDE/ALUMINUM HYDROXICE/SIMETHICONE 120; 1200; 1200 MG/30ML; MG/30ML; MG/30ML
30 SUSPENSION ORAL EVERY 6 HOURS PRN
Status: DISCONTINUED | OUTPATIENT
Start: 2021-04-22 | End: 2021-04-30 | Stop reason: HOSPADM

## 2021-04-22 RX ORDER — HYDROXYZINE 50 MG/1
50 TABLET, FILM COATED ORAL 3 TIMES DAILY PRN
Status: DISCONTINUED | OUTPATIENT
Start: 2021-04-22 | End: 2021-04-30 | Stop reason: HOSPADM

## 2021-04-22 RX ADMIN — GABAPENTIN 900 MG: 300 CAPSULE ORAL at 23:02

## 2021-04-22 RX ADMIN — APIXABAN 10 MG: 5 TABLET, FILM COATED ORAL at 10:10

## 2021-04-22 RX ADMIN — GABAPENTIN 900 MG: 300 CAPSULE ORAL at 13:09

## 2021-04-22 RX ADMIN — BACITRACIN: 500 OINTMENT TOPICAL at 10:10

## 2021-04-22 RX ADMIN — HYDROXYCHLOROQUINE SULFATE 200 MG: 200 TABLET, FILM COATED ORAL at 10:09

## 2021-04-22 RX ADMIN — GABAPENTIN 900 MG: 300 CAPSULE ORAL at 06:18

## 2021-04-22 ASSESSMENT — PAIN DESCRIPTION - PROGRESSION
CLINICAL_PROGRESSION: NOT CHANGED
CLINICAL_PROGRESSION: NOT CHANGED

## 2021-04-22 ASSESSMENT — PAIN DESCRIPTION - FREQUENCY
FREQUENCY: CONTINUOUS
FREQUENCY: CONTINUOUS

## 2021-04-22 ASSESSMENT — PAIN DESCRIPTION - LOCATION
LOCATION: ARM
LOCATION: HAND;WRIST

## 2021-04-22 ASSESSMENT — PAIN DESCRIPTION - ORIENTATION: ORIENTATION: LEFT

## 2021-04-22 ASSESSMENT — SLEEP AND FATIGUE QUESTIONNAIRES: AVERAGE NUMBER OF SLEEP HOURS: 7

## 2021-04-22 ASSESSMENT — PAIN DESCRIPTION - PAIN TYPE: TYPE: SURGICAL PAIN

## 2021-04-22 ASSESSMENT — LIFESTYLE VARIABLES: HISTORY_ALCOHOL_USE: NO

## 2021-04-22 ASSESSMENT — PAIN SCALES - GENERAL
PAINLEVEL_OUTOF10: 7

## 2021-04-22 ASSESSMENT — PAIN DESCRIPTION - ONSET: ONSET: ON-GOING

## 2021-04-22 NOTE — CARE COORDINATION
Transitional Planning:    Called [facility] Access to initiate [facility] transfer as pt is medically stable for transfer and covid test was done yesterday which was negative. Spoke with Yuliya Fernandez, RN with [facility] [employer] as well as the charge nurse at [facility] with Dr. Kim Maharaj. They are able to accept for inpt psych, however, they are wanting clarification on the heat lamp and if it is medically necessary for the skin graft site or if it is just recommended. They are unsure if they are able to get the heat lamp for the unit or not. 2916- PS sent to plastics as well as gen surg regarding clarification on heat lamp and if it is medically necessary/how many days they want. 2284- transport packet prepared and is on 4c.    1401- spoke with Opal Domingo RN with trauma regarding ok for pt to go with no heat lamp and has detailed instructions for skin graft site in note to give to [facility]. Brittany Clemons with [facility] and relayed the message. She stated that they will need to figure out how to arrange a telehealth visit with trauma or plastics while the pt is there and asked how we can coordinate that.    1402- called Opal Domingo, with trauma regarding telehealth visit that [facility] is requesting.  req call back. 56- spoke with Opal Domingo with trauma and she stated that Dr. Jens Kowalski is not agreeable to telehealth visit and wants to see pt in office in 2 weeks. Called Jonny Costa with [facility] and updated her on above, she stated that we could initiate the transfer by calling access line back and getting a bed for him    1433- called [facility] to initiate transfer to [facility]. No answer,  req call back. 1442- faxed will call paperwork to [facility]. [facility]. Faxed voluntary admission form to them at [phone]. Waiting for bed placement to call for transportation time.      1630- received call from [facility] with bed number 236-1, with number to call report [phone].     4932- called [facility] to arrange transport. Spoke with Hitesh Dickens, pickup arranged for 5:15pm. Minesh Wick RN and updated her on above. She stated she would update the patient and family. 1640- called [facility], spoke with Tristin Hackett and updated her on pickup time.     [facility]  Written by: Vivek Odom RN    Patient Name: Rebekah Soria  Attending Provider: Ricardo Hernandez MD  Admit Date: 3/26/2021  7:08 AM  MRN: [id number]  Account: [de-identified]                     : 1980  Discharge Date: 21      Disposition: Cathie Self RN
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
## 2021-04-22 NOTE — PROGRESS NOTES
"       SUBJECTIVE       Kong Borja is a 41 year old  male with a PMH significant for:     Patient Active Problem List   Diagnosis     Health Care Home     Patient presents with:  Sleep Problem: trouble sleeping for the last 3 weeks    Patient reports that he has had trouble sleeping for the last 3 weeks. He does not have trouble sleeping any other day of the week besides Sunday. He falls asleep ok but then he wakes up and stays up. Nothing has changed in the last three weeks. He drinks about 5-6 alcoholic on Friday and Saturday. No restless leg syndrome. He exercises multiple times during the week. He works as an , he feels anxious about having to go back to work. No fever, chills, changes in sensation, decreased hearing, significant stressors. He has tried benadryl in the past which helped him sleep however he was displeased with the side effects of grogginess. He has not tried anything else. During the week he does not have any daytime fatigue.     PMH, Medications and Allergies were reviewed and updated as needed.    ROS: Negative other than stated above.        OBJECTIVE     Vitals:    12/14/18 1604   BP: 124/79   Pulse: 83   Resp: 20   Temp: 98.2  F (36.8  C)   TempSrc: Oral   SpO2: 97%   Weight: 78.9 kg (174 lb)   Height: 1.645 m (5' 4.75\")     Body mass index is 29.18 kg/m .    General :  healthy and alert, no distress  HEENT:  PERRLA, EOMI, MMM, no JVD, no discharge, sclera anicteric,      Normal [place]  Cardiovascular: regular rate and rhythm, normal S1/S2 no other heart sounds  Respiratory:  CTA, normal respiratory effort  Musculoskeletal: no edema, moves all fours  Skin:   no lesions or rashes on exposed skin  Neurological:  normal gait, no gross defects, moves all fours  Psychiatric:  appropriate mood and affect                      Hematological: normal cervical and supraclavicular lymph nodes  Gastrointestinal:       abdomen soft, non-tender, non-distended, no organomegaly or    masses, " Verbal order per Dr. Renan Ga that pt is not to perform ROM to LUE. Also informed pt is able to wear splints to support wrist and sling to provide comfort and decrease swelling. normal bowel sounds      ASSESSMENT AND PLAN     Diagnoses and all orders for this visit:    Sleep disorder: Patient with trouble maintaining sleep on Sunday nights. GAD7 of 2 and PHQ9 of 0. Likely related to poor sleep hygiene, alcohol use, and anxiety. We discussed proper sleep hygiene and use of melatonin and benadryl. He is to use melatonin 3 mg 2 hours before he wants to go to sleep and if desired benadryl 30 - 45 min before he wants to go to sleep and wake up 1 hour prior to when he has to leave the house in order to avoid grogginess at work.    -     diphenhydrAMINE (BENADRYL) 25 MG capsule; Take 1 capsule (25 mg) by mouth every 6 hours as needed for itching or allergies  -     melatonin 3 MG tablet; Take 1 tablet (3 mg) by mouth nightly as needed for sleep    RTC in 1 month for follow up of poor sleep if symptoms do not improve.  Discussed with MD Malachi Soto PGY 1  [facility]

## 2021-04-22 NOTE — PROGRESS NOTES
Plastics - [place]      Patient in good spirits. Graft dressing taken down. 95% take. Questionable area distally. No infection. Redressed with Bacitracin on Adaptic. Nursing to remove staples. Donor drying out nicely.
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
## 2021-04-22 NOTE — PROGRESS NOTES
Left Upper Extremity Weight Bearing: Non Weight Bearing  Other: NWB, No ROM, No pushing, pulling, lifting with LUE. Ok for sling and rest hand splint for comfort per Dr. Hannah Pretty 4/22/21  Required Braces or Orthoses  Left Upper Extremity Brace/Splint: Resting hand(Sling for comfort)  Position Activity Restriction  Other position/activity restrictions: s/p L forearm thrombectomy, fasciotomy, nerve repair 3/27/21  Subjective   General  Chart Reviewed: Yes  Patient assessed for rehabilitation services?: Yes  Family / Caregiver Present: Yes(wife & mother)  General Comment  Pain Assessment  Pain Assessment: 0-10  Pain Level: 7  Patient's Stated Pain Goal: No pain  Pain Type: Surgical pain  Pain Location: Arm  Pain Orientation: Left  Pain Radiating Towards: Left forearm  Pain Frequency: Continuous  Clinical Progression: Not changed  Response to Pain Intervention: Other(RN notified of pain)  Vital Signs  Patient Currently in Pain: Yes      Objective    ADL  Additional Comments: Pt in shower upon arrival with wifes assistance to setup and min assist w/UB/LB bathing and dressing. Balance  Sitting Balance: Independent  Standing Balance: Independent  Standing Balance  Time: Pt up in room without AD and no LOB  Functional Mobility  Functional - Mobility Device: No device  Activity: To/from bathroom  Assist Level: Independent  Functional Mobility Comments: No LOB     Transfers  Stand Step Transfers: Independent  Sit to stand: Independent  Stand to sit:  Independent  Transfer Comments: No AD     Plan   Plan  Times per week: 3-5 x/wk  Current Treatment Recommendations: Strengthening, ROM, Balance Training, Functional Mobility Training, Pain Management, Safety Education & Training, Patient/Caregiver Education & Training, Equipment Evaluation, Education, & procurement, Self-Care / ADL, Home Management Training    Goals  Short term goals  Time Frame for Short term goals: By discharge, pt will:  Short term goal 1: Demo I with functional transfers and functional mobility  Short term goal 2: Demo full A/AAROM to LUE to increase independence and functional performance of ADLs/IADLs  Short term goal 3: Demo SBA with setup for UB ADLs and grooming tasks, utilizing adaptive ADL techniques as appropriate  Short term goal 4: Demo SBA with setup for LB ADLs and toileting tasks, utilizing adaptive ADL techniques as appropriate  Short term goal 5: Demo 10+ minutes dynamic standing and reaching tasks to increase balance and independence for ADL/IADLs  Short term goal 6: independent initiate use of L UE into functional tasks in order to increase gross/fine motor coordination to L hand (Goal added by Karlos WEEMS/MAJOR 4/12/2021)     Therapy Time   Individual Concurrent Group Co-treatment   Time In  1130         Time Out  1200         Minutes  30 total tx time                 CHET COLINDRES/MAJOR

## 2021-04-23 PROBLEM — F31.13 BIPOLAR DISORDER WITH SEVERE MANIA (HCC): Status: ACTIVE | Noted: 2021-04-23

## 2021-04-23 PROCEDURE — 6370000000 HC RX 637 (ALT 250 FOR IP): Performed by: PSYCHIATRY & NEUROLOGY

## 2021-04-23 PROCEDURE — 1240000000 HC EMOTIONAL WELLNESS R&B

## 2021-04-23 PROCEDURE — 99223 1ST HOSP IP/OBS HIGH 75: CPT | Performed by: PSYCHIATRY & NEUROLOGY

## 2021-04-23 RX ADMIN — GABAPENTIN 900 MG: 300 CAPSULE ORAL at 09:00

## 2021-04-23 RX ADMIN — GABAPENTIN 900 MG: 300 CAPSULE ORAL at 15:38

## 2021-04-23 RX ADMIN — APIXABAN 10 MG: 5 TABLET, FILM COATED ORAL at 09:00

## 2021-04-23 RX ADMIN — GABAPENTIN 900 MG: 300 CAPSULE ORAL at 20:38

## 2021-04-23 RX ADMIN — HYDROXYCHLOROQUINE SULFATE 200 MG: 200 TABLET ORAL at 09:00

## 2021-04-23 RX ADMIN — APIXABAN 10 MG: 5 TABLET, FILM COATED ORAL at 20:38

## 2021-04-23 ASSESSMENT — SLEEP AND FATIGUE QUESTIONNAIRES
DIFFICULTY FALLING ASLEEP: YES
DIFFICULTY STAYING ASLEEP: NO
DO YOU HAVE DIFFICULTY SLEEPING: YES

## 2021-04-23 ASSESSMENT — PAIN SCALES - GENERAL: PAINLEVEL_OUTOF10: 7

## 2021-04-23 ASSESSMENT — PATIENT HEALTH QUESTIONNAIRE - PHQ9: SUM OF ALL RESPONSES TO PHQ QUESTIONS 1-9: 7

## 2021-04-23 NOTE — GROUP NOTE
Group Therapy Note    Date: 4/23/2021    Group Start Time: 1330  Group End Time: 9890  Group Topic: relaxation group    [facility]    Marcy Weston, BRYCES        Group Therapy Note    Attendees: 7         Patient's Goal:  To improve relaxation techniques    Notes:   Pt was pleasant and participated well     Status After Intervention:  Improved    Participation Level:  Active Listener and Interactive    Participation Quality: Appropriate, Attentive and Supportive      Speech:  normal      Thought Process/Content: Logical      Affective Functioning: Congruent      Mood: euthymic      Level of consciousness:  Alert and Oriented x4      Response to Learning: Able to verbalize current knowledge/experience and Able to verbalize/acknowledge new learning      Endings: None Reported    Modes of Intervention: Education, Support and Problem-solving      Discipline Responsible: Psychoeducational Specialist      Signature:  Koko Forde
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
## 2021-04-23 NOTE — PROGRESS NOTES
Behavioral Services  Medicare Certification Upon Admission    I certify that this patient's inpatient psychiatric hospital admission is medically necessary for:    [x] (1) Treatment which could reasonably be expected to improve this patient's condition,       [x] (2) Or for diagnostic study;     AND     [x](2) The inpatient psychiatric services are provided while the individual is under the care of a physician and are included in the individualized plan of care.     Estimated length of stay/service 5-9 days    Plan for post-hospital care -outpatient care    Electronically signed by Lillie Eastman MD on 4/23/2021 at 9:36 AM

## 2021-04-23 NOTE — PLAN OF CARE
Problem: Pain:  Goal: Control of acute pain  Description: Control of acute pain  4/23/2021 1846 by Elvin Keen LPN  Outcome: Ongoing   Patient reports he has pain but it is manageable   Problem: Altered Mood, Psychotic Behavior:  Goal: Able to verbalize reality based thinking  Description: Able to verbalize reality based thinking  4/23/2021 1846 by Joaquina Villanueva LPN  Outcome: Ongoing   Mr Dahl Arm is seen in the dayroom affect is flat, he reports anxiety over being here and worry about is wound on his arm, Dressing was changed and the area has no signs and symptoms of infection. Denies any suicidal thoughts, No issues with sleep or appetite. Attends group and is social with peers.  15 minute safety checks continue
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
## 2021-04-23 NOTE — CARE COORDINATION
BHI Biopsychosocial Assessment    Current Level of Psychosocial Functioning     Independent   X  Dependent    Minimal Assist     Comments:      Psychosocial High Risk Factors (check all that apply)    Unable to obtain meds   Chronic illness/pain    Substance abuse   Lack of Family Support   Financial stress   Isolation   Inadequate Community Resources  Suicide attempt(s)  Not taking medications   Victim of crime   X  Developmental Delay  Unable to manage personal needs    Age 72 or older   Homeless  No transportation   Readmission within 30 days  Unemployment  Traumatic Event    Psychiatric Advanced Directive:    Family to involve in treatment:  Pt reports having support from wife, parents and sibilings    Sexual Orientation:  Heterosexual     Patient Strengths: Employed, family support    Patient Barriers: Mental Health symptoms    Opiate education provided: Not needed    Safety plan: Completed    CMHC/MH history: Pt is linked with [facility]     Plan of Care:  medication management, group/individual therapies, family meetings, psycho -education, treatment team meetings to assist with stabilization    Initial Discharge Plan:  Return home and follow-up with Counseling at [address] [place] [address]  Summary:  Pt is a 47year old male admitted to the [facility] from [address] [place] [address] Dr. Medel medical unit for safety and stabilization. Pt denied any thoughts of SI, HI or hallucinations at the time of assessment, however, pt identified with thoughts of being watched by [place], my higher power\"  In the past (last summer). Pt was alert during assessment. Pt reports some memory of the \"incident\" that brought him into hospital, however, pt did make several attempts to avoid discussing it by talking about the episode that he experienced last summer which led him to go live with his parents for about 3 months.   Pt is involved in counseling with [facility] and reports getting involved with them after last summers incident. Pt is currently employed at [employer] and has been for the past 8 years.

## 2021-04-23 NOTE — SUICIDE SAFETY PLAN
SAFETY PLAN    A suicide Safety Plan is a document that supports someone when they are having thoughts of suicide. Warning Signs that indicate a suicidal crisis may be developing: What (situations, thoughts, feelings, body sensations, behaviors, etc.) do you experience that lets you know you are beginning to think about suicide? 1. \"I don't know, I value my life\"    Internal Coping Strategies:  What things can I do (relaxation techniques, hobbies, physical activities, etc.) to take my mind off my problems without contacting another person? 1. Work out  2. Building things  3. Briana Ion out with son    People and social settings that provide distraction: Who can I call or where can I go to distract me? [address] whom I can ask for help: Who can I call when I need help - for example, friends, family, clergy, someone else? 1. Parents  2. SIblings  3. Wife    Professionals or [address] [place] [address] Blvd I can contact during a crisis: Who can I call for help - for example, my doctor, my psychiatrist, my psychologist, a mental health provider, a suicide hotline? 1. Paulo Saucedo. 56. [address] [facility],[address], [place] [address] [address]    2. Suicide Prevention Lifeline: 3-835-431-TALK (2738)      Making the environment safe: How can I make my environment (house/apartment/living space) safer? For example, can I remove guns, medications, and other items?   1. \"I'm not going to harm myself\"

## 2021-04-23 NOTE — GROUP NOTE
Group Therapy Note    Date: 4/23/2021    Group Start Time: 1440  Group End Time: 6040  Group Topic: Cognitive Skills    DAKOTAH BHI D    JAX Fuller        Group Therapy Note    Attendees: 6/10         Patient's Goal: To increase social interaction and to practice self expression, through creative expression and explore positive coping skills, relaxation techniques, positive resources, and positive self change     Notes: Pt participated fully in group task . Pt shared individually and in group discussionwith peers and RT. Pt was able to practice self expression, through creative expression and explored positive coping skills, relaxation techniques, positive resources, and positive self change. Pt was supportive of peers and able to relate to some of their ideas, feelings, and experiences. .      Status After Intervention:  Improved     Participation Level:  Active Listener and Interactive      Participation Quality:  Attentive , Sharing ,Supportive        Speech:  Normal     Thought Process/Content: Logical  ,linear, articulate     Affective Functioning: Congruent     Mood: euthymic     Level of consciousness:  Alert,  and Attentive         Response to Learning:  Able to verbalize existing knowledge,able to acknowledge/verbalize  new learning, and Progressing to goal        Endings: None Reported     Modes of Intervention: Education, Support, Socialization, Exploration, Clarifying and Problem-solving        Discipline Responsible: Psychoeducational Specialist        Signature:  JAX Grant

## 2021-04-23 NOTE — GROUP NOTE
Group Therapy Note    Date: 4/23/2021    Group Start Time: 0900  Group End Time: 0940  Group Topic: Community Meeting    DAKOTAH Reaves, [address]         Group Therapy Note    Attendees: 9/15         Patient's Goal:  To increase social interaction and to explore and identify short term goals r/t wellness and recovery. RT discussed group schedule and unit structure/resources and encouraged pts to be engaged in their treatment. Pts were given opportunities to ask questions. Notes: Pt participated in group task and was able to identify short term goals r/t wellness and recovery. Pt is pleasant and supportive of peers        Status After Intervention:  Improved     Participation Level:  Active Listener and Interactive     Participation Quality: Appropriate, Attentive, Sharing         Speech:   Normal     Thought Process/Content: Logical,linear     Affective Functioning: Blunted, brightens briefly    Mood: reserved but answers questions on approach     Level of consciousness:  Alert, and Attentive        Response to Learning: Able to verbalize current knowledge/experience, Able to verbalize/acknowledge new learning, and Progressing to goal        Endings: None Reported     Modes of Intervention: Education, Support, Socialization, Exploration, Clarifying and Problem-solving        Discipline Responsible: Psychoeducational Specialist        Signature:  Zelalem Tucker
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
## 2021-04-23 NOTE — GROUP NOTE
Group Therapy Note    Date: 4/23/2021    Group Start Time: 1154  Group End Time: 8554  Group Topic: Cognitive Skills    DAKOTAH BHI JAX Aragon        Group Therapy Note    Attendees: 6/10         Patient's Goal: To increase social interaction and to practice self expression, and identifying feelings and their impact positive and negative. Notes: Pt participated fully in group task . Pt shared individually and in group discussion with peers and RT. Pt was able to practice self expression, and identifying feelings and their impact positive and negative. Pt was supportive of peers and able to relate to some of their ideas, feelings, and experiences. Status After Intervention:  Improved     Participation Level:  Active Listener and Interactive      Participation Quality:  Attentive , Sharing ,Supportive        Speech:  Normal     Thought Process/Content: Logical  ,linear, articulate     Affective Functioning: Congruent     Mood: euthymic     Level of consciousness:  Alert,  and Attentive         Response to Learning:  Able to verbalize existing knowledge,able to acknowledge/verbalize  new learning, and Progressing to goal        Endings: None Reported     Modes of Intervention: Education, Support, Socialization, Exploration, Clarifying and Problem-solving        Discipline Responsible: Psychoeducational Specialist        Signature:  JAX Del Valle

## 2021-04-23 NOTE — GROUP NOTE
Group Therapy Note    Date: 4/23/2021    Group Start Time: 1000  Group End Time: 1030  Group Topic: Psychotherapy    [facility]    Oh Torres        Group Therapy Note    Attendees: *7/15         Patient's Goal:  PT will demonstrate increased interpersonal interaction and a clear understanding on multiple types of coping skills relating to the here-and-now therapeutic practice. Notes:  Patient is making progress, AEB participating in group discussion, actively listening, and supporting other group members. PT participates in group and encourages others to participate     Status After Intervention:  Improved    Participation Level: Active Listener and Interactive    Participation Quality: Appropriate, Attentive and Sharing      Speech:  normal      Thought Process/Content: Logical      Affective Functioning: Flat      Mood: depressed      Level of consciousness:  Alert, Oriented x4 and Attentive      Response to Learning: Able to verbalize/acknowledge new learning and Progressing to goal      Endings: None Reported    Modes of Intervention: Support, Socialization, Exploration, Clarifying and Problem-solving      Discipline Responsible: /Counselor      Signature:   Oh Torres

## 2021-04-24 PROCEDURE — 6370000000 HC RX 637 (ALT 250 FOR IP): Performed by: PSYCHIATRY & NEUROLOGY

## 2021-04-24 PROCEDURE — 1240000000 HC EMOTIONAL WELLNESS R&B

## 2021-04-24 PROCEDURE — 99253 IP/OBS CNSLTJ NEW/EST LOW 45: CPT | Performed by: INTERNAL MEDICINE

## 2021-04-24 PROCEDURE — 99232 SBSQ HOSP IP/OBS MODERATE 35: CPT | Performed by: NURSE PRACTITIONER

## 2021-04-24 PROCEDURE — 6370000000 HC RX 637 (ALT 250 FOR IP): Performed by: INTERNAL MEDICINE

## 2021-04-24 RX ORDER — DIVALPROEX SODIUM 500 MG/1
500 TABLET, EXTENDED RELEASE ORAL DAILY
Status: DISCONTINUED | OUTPATIENT
Start: 2021-04-24 | End: 2021-04-30 | Stop reason: HOSPADM

## 2021-04-24 RX ORDER — OXYCODONE HYDROCHLORIDE AND ACETAMINOPHEN 5; 325 MG/1; MG/1
1 TABLET ORAL 2 TIMES DAILY PRN
Status: DISCONTINUED | OUTPATIENT
Start: 2021-04-24 | End: 2021-04-30 | Stop reason: HOSPADM

## 2021-04-24 RX ADMIN — GABAPENTIN 900 MG: 300 CAPSULE ORAL at 20:52

## 2021-04-24 RX ADMIN — HYDROXYCHLOROQUINE SULFATE 200 MG: 200 TABLET ORAL at 08:10

## 2021-04-24 RX ADMIN — GABAPENTIN 900 MG: 300 CAPSULE ORAL at 13:00

## 2021-04-24 RX ADMIN — APIXABAN 10 MG: 5 TABLET, FILM COATED ORAL at 20:52

## 2021-04-24 RX ADMIN — APIXABAN 10 MG: 5 TABLET, FILM COATED ORAL at 08:10

## 2021-04-24 RX ADMIN — GABAPENTIN 900 MG: 300 CAPSULE ORAL at 08:10

## 2021-04-24 RX ADMIN — OXYCODONE HYDROCHLORIDE AND ACETAMINOPHEN 1 TABLET: 5; 325 TABLET ORAL at 22:20

## 2021-04-24 ASSESSMENT — PAIN SCALES - GENERAL: PAINLEVEL_OUTOF10: 7

## 2021-04-24 ASSESSMENT — PAIN DESCRIPTION - ORIENTATION: ORIENTATION: LEFT

## 2021-04-24 ASSESSMENT — PAIN DESCRIPTION - LOCATION: LOCATION: HAND

## 2021-04-24 ASSESSMENT — PAIN DESCRIPTION - PAIN TYPE: TYPE: ACUTE PAIN

## 2021-04-24 NOTE — GROUP NOTE
Group Therapy Note    Date: 4/24/2021    Group Start Time: 0900  Group End Time: 0940  Group Topic: Community Meeting    DAKOTAH Mayes, [place]        Group Therapy Note    Attendees: 7/16            Patient's Goal:  To increase social interaction and to explore and identify short term goals r/t wellness and recovery. RT discussed group schedule and unit structure/resources and encouraged pts to be engaged in their treatment. Pts were given opportunities to ask questions. Notes: Pt participated in group task and was able to identify short term goals r/t wellness and recovery. Pt is pleasant and supportive of peers        Status After Intervention:  Improved     Participation Level:  Active Listener and Interactive     Participation Quality: Appropriate, Attentive, Sharing         Speech:   Normal     Thought Process/Content: Logical,linear     Affective Functioning: Congruent     Mood: euthymic        Level of consciousness:  Alert, and Attentive        Response to Learning: Able to verbalize current knowledge/experience, Able to verbalize/acknowledge new learning, and Progressing to goal        Endings: None Reported     Modes of Intervention: Education, Support, Socialization, Exploration, Clarifying and Problem-solving        Discipline Responsible: Psychoeducational Specialist        Signature:  Bruna Torres

## 2021-04-24 NOTE — GROUP NOTE
Group Therapy Note    Date: 4/24/2021    Group Start Time: 1330  Group End Time: 8617  Group Topic: Cognitive Skills    JAX Khalil        Group Therapy Note    Attendees: 8/16         Patient's Goal:  To increase social interaction and to practice problem solving, and visual et verbal communication skills        Notes: Pt participated fully in group . Pt was able to practice problem solving, and visual et verbal communication skills. Pt was pleasant and supportive of peers. Status After Intervention:  Improved         Participation Level:  Active Listener and Interactive     Participation Quality: Appropriate, Attentive, Sharing and Supportive        Speech:  Normal        Thought Process/Content: Logical ,linear         Affective Functioning: Congruent, brightens      Mood: euthymic         Level of consciousness:  Alert, Oriented x4 and Attentive        Response to Learning: Able to verbalize current knowledge/experience, Able to verbalize/acknowledge new learning,  and Progressing to goal        Endings: None Reported     Modes of Intervention: Education, Support, Socialization, Exploration, Clarifying and Problem-solving        Discipline Responsible: Psychoeducational Specialist        Signature:  Ronald Garcia

## 2021-04-24 NOTE — PLAN OF CARE
No  Present Homicidal Ideation: No    Daily Assessment Last Entry:   Daily Sleep (WDL): Within Defined Limits         Patient Currently in Pain: No  Daily Nutrition (WDL): Within Defined Limits    Patient Monitoring:  Frequency of Checks: 4 times per hour, close    Psychiatric Symptoms:   Depression Symptoms  Depression Symptoms: Isolative, Loss of interest  Anxiety Symptoms  Anxiety Symptoms: Generalized  Brea Symptoms  Brea Symptoms: No problems reported or observed. Psychosis Symptoms  Delusion Type: No problems reported or observed.     Suicide Risk CSSR-S:  Have you wished you were dead or wished you could go to sleep and not wake up? : NO  Have you actually had any thoughts of killing yourself? : NO  Have you ever done anything, started to do anything, or prepared to do anything to end your life?: NO  Change in Result                  no                    Change in Plan of care                  no      EDUCATION:   EDUCATION:   Learner Progress Toward Treatment Goals: Reviewed results and recommendations of this team, Reviewed group plan and strategies, Reviewed signs, symptoms and risk of self harm and violent behavior, Reviewed goals and plan of care    Method:small group, individual verbal education    Outcome:verbalized by patient, but needs reinforcement to obtain goals    PATIENT GOALS:  Short term: \"work on meds and treatment plan with Dr. Elizabeth Carpenter"  Long term: \"follow up with therapy and learn tools to cope better with Bipolar Disorder\"    PLAN/TREATMENT RECOMMENDATIONS UPDATE: continue with group therapies, increased socialization, continue planning for after discharge goals, continue with medication compliance    SHORT-TERM GOALS UPDATE:   Time frame for Short-Term Goals: 5-7 days    LONG-TERM GOALS UPDATE:   Time frame for Long-Term Goals: 6 months  Members Present in Team Meeting: See Signature Sheet    Trav Song
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
## 2021-04-24 NOTE — BH NOTE
Line of Sight Note    Patient is sitting in dayroom playing games and socializing with peers. Sling intact and no signs of distress noted. Line of sight continued per doctor's order.

## 2021-04-24 NOTE — BH NOTE
LOS Note:    Patient out in dayroom socializing. No distress noted. Continue to monitor per physician order.

## 2021-04-24 NOTE — PLAN OF CARE
Problem: Altered Mood, Psychotic Behavior:  Goal: Able to demonstrate trust by eating, participating in treatment and following staff's direction  Description: Able to demonstrate trust by eating, participating in treatment and following staff's direction  Outcome: Ongoing   Patient is following staff direction, and eating meals during the day. He is pleasant and cooperative, and stays mostly in the dayroom during the day talking to his peers. Problem: Altered Mood, Psychotic Behavior:  Goal: Able to verbalize reality based thinking  Description: Able to verbalize reality based thinking  Outcome: Ongoing   Patient is able to verbalize reality based thinking, he states he is just \"going with the flow\" and that \"he will be here for awhile\". Problem: Altered Mood, Psychotic Behavior:  Goal: Absence of self-harm  Description: Absence of self-harm  Outcome: Ongoing   Patient is absent of self-harm at this time. He is a Line of Sight for his sling at times. Patient is fifteen minute safety check.

## 2021-04-24 NOTE — PLAN OF CARE
Problem: Pain:  Goal: Control of acute pain  Description: Control of acute pain  4/23/2021 2308 by Lanre Chew RN  Outcome: Ongoing     Problem: Altered Mood, Psychotic Behavior:  Goal: Able to verbalize reality based thinking  Description: Able to verbalize reality based thinking  4/23/2021 2308 by Lanre Chew RN  Outcome: Ongoing     Problem: Altered Mood, Psychotic Behavior:  Goal: Absence of self-harm  Description: Absence of self-harm  Outcome: Ongoing   No self harm noted this shift. Patient continue to have pain due to surgery. He did not want to wear his sling this shift. Patient denies all. He reports no anxiety, depression, auditory or visual hallucinations, or suicidal or homicidal thoughts. Patient reports that he is just going with the flow.

## 2021-04-24 NOTE — BH NOTE
LOS Note:    Patient out in dayroom socializing and playing games with peers. No distress noted. Continue to monitor per physician order.
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
## 2021-04-24 NOTE — GROUP NOTE
Group Therapy Note    Date: 4/24/2021    Group Start Time: 1600  Group End Time: 6129  Group Topic: Healthy Living/Wellness    [facility]    Yue Noel        Group Therapy Note    Attendees: 7/16         Patient's Goal:  Discuss various coping skills and the benefit of each. Notes:      Status After Intervention:  Improved    Participation Level:  Active Listener    Participation Quality: Appropriate      Speech:  normal      Thought Process/Content: Logical      Affective Functioning: Congruent      Mood: normal      Level of consciousness:  Alert      Response to Learning: Able to verbalize current knowledge/experience      Endings: None Reported    Modes of Intervention: Education      Discipline Responsible: [facility] [address] [facility] [employer]      Signature:  Yue Noel

## 2021-04-25 PROCEDURE — 99231 SBSQ HOSP IP/OBS SF/LOW 25: CPT | Performed by: INTERNAL MEDICINE

## 2021-04-25 PROCEDURE — 6370000000 HC RX 637 (ALT 250 FOR IP): Performed by: NURSE PRACTITIONER

## 2021-04-25 PROCEDURE — 6370000000 HC RX 637 (ALT 250 FOR IP): Performed by: PSYCHIATRY & NEUROLOGY

## 2021-04-25 PROCEDURE — 1240000000 HC EMOTIONAL WELLNESS R&B

## 2021-04-25 PROCEDURE — 99232 SBSQ HOSP IP/OBS MODERATE 35: CPT | Performed by: NURSE PRACTITIONER

## 2021-04-25 PROCEDURE — 6370000000 HC RX 637 (ALT 250 FOR IP): Performed by: INTERNAL MEDICINE

## 2021-04-25 RX ORDER — RISPERIDONE 0.25 MG/1
0.25 TABLET, FILM COATED ORAL NIGHTLY
Status: DISCONTINUED | OUTPATIENT
Start: 2021-04-25 | End: 2021-04-26

## 2021-04-25 RX ADMIN — APIXABAN 10 MG: 5 TABLET, FILM COATED ORAL at 21:29

## 2021-04-25 RX ADMIN — GABAPENTIN 900 MG: 300 CAPSULE ORAL at 21:29

## 2021-04-25 RX ADMIN — APIXABAN 10 MG: 5 TABLET, FILM COATED ORAL at 09:04

## 2021-04-25 RX ADMIN — HYDROXYCHLOROQUINE SULFATE 200 MG: 200 TABLET ORAL at 09:04

## 2021-04-25 RX ADMIN — DIVALPROEX SODIUM 500 MG: 500 TABLET, EXTENDED RELEASE ORAL at 09:04

## 2021-04-25 RX ADMIN — GABAPENTIN 900 MG: 300 CAPSULE ORAL at 09:04

## 2021-04-25 RX ADMIN — RISPERIDONE 0.25 MG: 0.25 TABLET ORAL at 21:29

## 2021-04-25 RX ADMIN — GABAPENTIN 900 MG: 300 CAPSULE ORAL at 14:53

## 2021-04-25 RX ADMIN — OXYCODONE HYDROCHLORIDE AND ACETAMINOPHEN 1 TABLET: 5; 325 TABLET ORAL at 21:29

## 2021-04-25 ASSESSMENT — PAIN DESCRIPTION - ORIENTATION: ORIENTATION: LEFT

## 2021-04-25 ASSESSMENT — PAIN SCALES - GENERAL: PAINLEVEL_OUTOF10: 7

## 2021-04-25 ASSESSMENT — PAIN DESCRIPTION - PAIN TYPE: TYPE: ACUTE PAIN
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
## 2021-04-25 NOTE — PROGRESS NOTES
Daily Progress Note  Rocio BourgeoisADRIENNE  4/25/2021      CHIEF COMPLAINT: Acute psychosis    Reviewed patient's current plan of care and vital signs with nursing staff. Sleep:  several hours last night  Attending groups: Yes    SUBJECTIVE:    Staff reports no overnight events, patient did not receive his dose of Depakote yesterday because he requested in the evening and this author was not notified to change the dosing time. He did receive a dose this morning, he states that he did not want to risk not receiving it therefore he took it this morning. He is denying any side effects at present. He reports that he had some difficulty falling asleep last night. He continues to experience pain from his left upper extremity injuries. He is using as needed Percocet for pain, he reports that he is trying to use that sparingly. He denies any racing thoughts at present. He is attending group and feels that they are beneficial.  He reports that his appetite is \"okay\". He reports that he is still confused and concerned about his new diagnosis and trying to understand what happened and worries about the risk of it happening again. He is anxious with regards to fearing another manic episode. We discussed the plan for medication, we will continue the Depakote at the current dosage and start low-dose risperidone tonight and monitor for improvement in sleep. If he has difficulty falling asleep with his 0.25 mg will increase to 0.5 mg tomorrow. Patient is agreeable with this plan of care.       Mental Status Exam  Level of consciousness:  Awake and alert  Appearance: Hospital attire, seated in chair, left upper extremity in splint, good grooming and hygiene   Behavior/Motor: Approachable, no abnormalities noted  Attitude toward examiner:  Cooperative, attentive, good eye contact  Speech:  spontaneous, normal rate, normal volume and well articulated  Mood: \"Worried\"  Affect: Mood congruent, anxious  Thought processes:  linear, goal directed and coherent, no racing thoughts  Thought content:  denies homicidal ideation  Suicidal Ideation: Denies suicidal ideation  Delusions:  no evidence of delusions  Perceptual Disturbance:  denies any perceptual disturbance  Cognition:  Oriented to person, place, time/date and situation  Memory: age appropriate  Insight & Judgement: Fair  Medication side effects:  denies       Data   height is 5' 8\" (1.727 m) and weight is 142 lb (64.4 kg). His oral temperature is 98.4 °F (36.9 °C). His blood pressure is 111/60 and his pulse is 85. His respiration is 16. Labs:   No visits with results within 2 Day(s) from this visit. Latest known visit with results is:   No results displayed because visit has over 200 results.                Medications  Current Facility-Administered Medications: risperiDONE (RISPERDAL) tablet 0.25 mg, 0.25 mg, Oral, Nightly  divalproex (DEPAKOTE ER) extended release tablet 500 mg, 500 mg, Oral, Daily  oxyCODONE-acetaminophen (PERCOCET) 5-325 MG per tablet 1 tablet, 1 tablet, Oral, BID PRN  aluminum & magnesium hydroxide-simethicone (MAALOX) 200-200-20 MG/5ML suspension 30 mL, 30 mL, Oral, Q6H PRN  polyethylene glycol (GLYCOLAX) packet 17 g, 17 g, Oral, Daily PRN  traZODone (DESYREL) tablet 50 mg, 50 mg, Oral, Nightly PRN  ibuprofen (ADVIL;MOTRIN) tablet 400 mg, 400 mg, Oral, Q6H PRN  acetaminophen (TYLENOL) tablet 650 mg, 650 mg, Oral, Q4H PRN  hydrOXYzine (ATARAX) tablet 50 mg, 50 mg, Oral, TID PRN  diphenhydrAMINE (BENADRYL) injection 50 mg, 50 mg, Intramuscular, Q4H PRN **AND** haloperidol lactate (HALDOL) injection 5 mg, 5 mg, Intramuscular, Q4H PRN **AND** LORazepam (ATIVAN) injection 2 mg, 2 mg, Intramuscular, Q4H PRN  LORazepam (ATIVAN) tablet 2 mg, 2 mg, Oral, Q4H PRN **AND** haloperidol (HALDOL) tablet 5 mg, 5 mg, Oral, Q4H PRN  hydroxychloroquine (PLAQUENIL) tablet 200 mg, 200 mg, Oral, Daily  gabapentin (NEURONTIN) capsule 900 mg, 900 mg, Oral, TID  apixaban (ELIQUIS) tablet 10 mg, 10 mg, Oral, BID    ASSESSMENT  Bipolar disorder, current episode manic, severe    PLAN  Patients symptoms show modest improvement  Monitor tolerance of Depakote, start risperidone 0.25 mg nightly  Internal medicine consulted for management of postsurgical pain  Monitor need and frequency of as needed medication  Maintain line of sight secondary to shoulder sling  Encourage participation in groups and milieu  attempt to develop insight  Psycho-education conducted. Supportive Therapy conducted. Probable discharge is undetermined at this time  Follow-up daily while on the inpatient unit    **This report has been created using voice recognition software. It may contain minor errors which are inherent in voice recognition technology. **

## 2021-04-25 NOTE — PLAN OF CARE
Problem: Altered Mood, Psychotic Behavior:  Goal: Able to verbalize reality based thinking  Description: Able to verbalize reality based thinking  4/24/2021 2046 by Suha Patino RN  Outcome: Ongoing   Patient states they are not having thoughts of self harm at this time and verbally agrees to seek staff if feelings of harming self arise. Patient behavior controlled and accepting of medications,flat,denies audio hallucination and visual hallucinations patient eating and sleeping well. Staff will continue to monitor for safety and offer support as needed.     Problem: Altered Mood, Psychotic Behavior:  Goal: Absence of self-harm  Description: Absence of self-harm  4/24/2021 2046 by Suha Patino RN  Outcome: Ongoing   Remains free self harm and safe on line of sight checks

## 2021-04-25 NOTE — GROUP NOTE
Group Therapy Note    Date: 4/25/2021    Group Start Time: 0900  Group End Time: 0930  Group Topic: Community Meeting    DAKOTAH Pacheco, [address]        Group Therapy Note    Attendees: 4/20              Patient's Goal:  To increase social interaction and to explore and identify short term goals r/t wellness and recovery. RT discussed group schedule and unit structure/resources and encouraged pts to be engaged in their treatment. Pts were given opportunities to ask questions. Notes: Pt participated in group task and was able to identify short term goals r/t wellness and recovery. Pt is pleasant and supportive of peers        Status After Intervention:  Improved     Participation Level:  Active Listener and Interactive     Participation Quality: Appropriate, Attentive, Sharing         Speech:   Normal     Thought Process/Content: Logical,linear     Affective Functioning: Congruent     Mood: euthymic        Level of consciousness:  Alert, and Attentive        Response to Learning: Able to verbalize current knowledge/experience, Able to verbalize/acknowledge new learning, and Progressing to goal        Endings: None Reported     Modes of Intervention: Education, Support, Socialization, Exploration, Clarifying and Problem-solving        Discipline Responsible: Psychoeducational Specialist        Signature:  Twila Arango

## 2021-04-25 NOTE — PLAN OF CARE
Problem: Altered Mood, Psychotic Behavior:  Goal: Absence of self-harm  Description: Absence of self-harm  Outcome: Ongoing   Patient remains free from self harm.     Problem: Altered Mood, Psychotic Behavior:  Goal: Able to verbalize reality based thinking  Description: Able to verbalize reality based thinking  Outcome: Ongoing   Patient is able to answer questions approprietly

## 2021-04-26 PROCEDURE — 99232 SBSQ HOSP IP/OBS MODERATE 35: CPT | Performed by: PSYCHIATRY & NEUROLOGY

## 2021-04-26 PROCEDURE — 1240000000 HC EMOTIONAL WELLNESS R&B

## 2021-04-26 PROCEDURE — 6370000000 HC RX 637 (ALT 250 FOR IP): Performed by: PSYCHIATRY & NEUROLOGY

## 2021-04-26 PROCEDURE — 6370000000 HC RX 637 (ALT 250 FOR IP): Performed by: NURSE PRACTITIONER

## 2021-04-26 PROCEDURE — 6370000000 HC RX 637 (ALT 250 FOR IP): Performed by: INTERNAL MEDICINE

## 2021-04-26 RX ORDER — RISPERIDONE 1 MG/1
0.5 TABLET, FILM COATED ORAL NIGHTLY
Status: DISCONTINUED | OUTPATIENT
Start: 2021-04-26 | End: 2021-04-28

## 2021-04-26 RX ADMIN — OXYCODONE HYDROCHLORIDE AND ACETAMINOPHEN 1 TABLET: 5; 325 TABLET ORAL at 22:04

## 2021-04-26 RX ADMIN — APIXABAN 10 MG: 5 TABLET, FILM COATED ORAL at 20:42

## 2021-04-26 RX ADMIN — DIVALPROEX SODIUM 500 MG: 500 TABLET, EXTENDED RELEASE ORAL at 08:03

## 2021-04-26 RX ADMIN — RISPERIDONE 0.5 MG: 1 TABLET ORAL at 20:42

## 2021-04-26 RX ADMIN — GABAPENTIN 900 MG: 300 CAPSULE ORAL at 08:03

## 2021-04-26 RX ADMIN — GABAPENTIN 900 MG: 300 CAPSULE ORAL at 14:30

## 2021-04-26 RX ADMIN — GABAPENTIN 900 MG: 300 CAPSULE ORAL at 20:42

## 2021-04-26 RX ADMIN — HYDROXYCHLOROQUINE SULFATE 200 MG: 200 TABLET ORAL at 08:03

## 2021-04-26 RX ADMIN — APIXABAN 10 MG: 5 TABLET, FILM COATED ORAL at 08:03

## 2021-04-26 ASSESSMENT — PAIN DESCRIPTION - ORIENTATION: ORIENTATION: LEFT

## 2021-04-26 ASSESSMENT — PAIN SCALES - GENERAL: PAINLEVEL_OUTOF10: 7

## 2021-04-26 ASSESSMENT — PAIN - FUNCTIONAL ASSESSMENT: PAIN_FUNCTIONAL_ASSESSMENT: 0-10

## 2021-04-26 ASSESSMENT — PAIN DESCRIPTION - PAIN TYPE: TYPE: ACUTE PAIN

## 2021-04-26 NOTE — GROUP NOTE
Group Therapy Note    Date: 4/26/2021    Group Start Time: 0900  Group End Time: 0920  Group Topic: Community Meeting    DAKOTAH ARMAS    Polo Jang        Group Therapy Note    Attendees: 5/22         Patient's Goal:  To increase interpersonal interaction     Notes:      Status After Intervention:  Improved    Participation Level:  Active Listener and Interactive    Participation Quality: Appropriate, Attentive and Sharing      Speech:  normal      Thought Process/Content: Logical  Linear      Affective Functioning: Constricted/Restricted      Mood: depressed      Level of consciousness:  Alert, Oriented x4 and Attentive      Response to Learning: Able to verbalize current knowledge/experience, Able to verbalize/acknowledge new learning, Able to retain information and Progressing to goal      Endings: None Reported    Modes of Intervention: Education, Support, Socialization, Exploration, Clarifying and Problem-solving      Discipline Responsible: Psychoeducational Specialist      Signature:  Polo Jang

## 2021-04-26 NOTE — GROUP NOTE
Group Therapy Note    Date: 4/26/2021    Group Start Time: 1430  Group End Time: 1246  Group Topic: Cognitive Skills    [facility]        Group Therapy Note    Attendees: 6         Patient's Goal:  To improve cognitive skills     Notes:   Pt  was pleasant and participated well     Status After Intervention: improved    Participation Level: appropriate    Participation Quality: normal      Speech:  normal      Thought Process/Content: logical      Affective Functioning: flat      Mood: depressed       Level of consciousness:  Alert      Response to Learning: Progressing to goal, able to verbalize new learning       Endings: None Reported    Modes of Intervention: Education, Support, Socialization and Problem-solving      Discipline Responsible: Psychoeducational Specialist      Signature:  Albesa Paget Try stopping the oxybutynin and see if your dizziness improves.

## 2021-04-26 NOTE — GROUP NOTE
Group Therapy Note    Date: 4/26/2021    Group Start Time: 1000  Group End Time: 1100  Group Topic: Psychoeducation    [facility]    RAYA Medel LSW        Group Therapy Note    Attendees: 2         Patient's Goal:  Pt will demonstrate increased interpersonal interaction. Notes:  Pt participated well in group.     Status After Intervention:  Improved    Participation Level: Interactive    Participation Quality: Appropriate      Speech:  normal      Thought Process/Content: Logical      Affective Functioning: Congruent      Mood: elevated      Level of consciousness:  Alert      Response to Learning: Progressing to goal      Endings: None Reported    Modes of Intervention: Support      Discipline Responsible: /Counselor      Signature:  RAYA Medel LSW

## 2021-04-26 NOTE — BH NOTE
LOS  Patient active in [place], not wearing sling, no self harming behaviors. Line of sight maintained.

## 2021-04-26 NOTE — PROGRESS NOTES
Daily Progress Note  Karina Sarabia CNP  4/26/2021      CHIEF COMPLAINT: Acute psychosis    Reviewed patient's current plan of care and vital signs with nursing staff. Sleep:  several hours last night  Attending groups: Yes    SUBJECTIVE:    Staff reports no overnight events, patient remains medication compliant. He reports that he is tolerating the Depakote well. He states that he did have trouble falling asleep last night though denies any side effects from the low-dose risperidone. We discussed the risks and benefits and mutually agreed to increase his dose of risperidone tonight to 0.5 mg. He reports that he is attending all group and feels that his concerns are different from the concerns of many of the other patients. He reports that he is trying to relate groups to his own problems. He states that he has spoken to his mother as well as his wife though reports that the relationship with his wife is tense. He endorses sadness that he has had little interaction with his son over the past few weeks. He endorses feeling down though denies any racing thoughts. He continues to experience pain in his left upper extremity. He denies any auditory or visual hallucinations. He denies any homicidal/suicidal ideation. He continues to contract for safety on the unit.     Mental Status Exam  Level of consciousness:  Awake and alert  Appearance: Hospital attire, seated in chair, left upper extremity in splint, good grooming and hygiene   Behavior/Motor: Approachable, no abnormalities noted  Attitude toward examiner:  Cooperative, attentive, good eye contact  Speech:  spontaneous, normal rate, normal volume and well articulated  Mood: \"Okay\"  Affect: Mood congruent, anxious  Thought processes:  linear, goal directed and coherent, no racing thoughts  Thought content:  denies homicidal ideation  Suicidal Ideation: Denies suicidal ideation  Delusions:  no evidence of delusions  Perceptual Disturbance:  denies any perceptual disturbance  Cognition:  Oriented to person, place, time/date and situation  Memory: age appropriate  Insight & Judgement: Fair  Medication side effects:  denies       Data   height is 5' 8\" (1.727 m) and weight is 142 lb (64.4 kg). His oral temperature is 97.9 °F (36.6 °C). His blood pressure is 109/63 and his pulse is 85. His respiration is 14. Labs:   No visits with results within 2 Day(s) from this visit. Latest known visit with results is:   No results displayed because visit has over 200 results.                Medications  Current Facility-Administered Medications: risperiDONE (RISPERDAL) tablet 0.5 mg, 0.5 mg, Oral, Nightly  divalproex (DEPAKOTE ER) extended release tablet 500 mg, 500 mg, Oral, Daily  oxyCODONE-acetaminophen (PERCOCET) 5-325 MG per tablet 1 tablet, 1 tablet, Oral, BID PRN  aluminum & magnesium hydroxide-simethicone (MAALOX) 200-200-20 MG/5ML suspension 30 mL, 30 mL, Oral, Q6H PRN  polyethylene glycol (GLYCOLAX) packet 17 g, 17 g, Oral, Daily PRN  traZODone (DESYREL) tablet 50 mg, 50 mg, Oral, Nightly PRN  ibuprofen (ADVIL;MOTRIN) tablet 400 mg, 400 mg, Oral, Q6H PRN  acetaminophen (TYLENOL) tablet 650 mg, 650 mg, Oral, Q4H PRN  hydrOXYzine (ATARAX) tablet 50 mg, 50 mg, Oral, TID PRN  diphenhydrAMINE (BENADRYL) injection 50 mg, 50 mg, Intramuscular, Q4H PRN **AND** haloperidol lactate (HALDOL) injection 5 mg, 5 mg, Intramuscular, Q4H PRN **AND** LORazepam (ATIVAN) injection 2 mg, 2 mg, Intramuscular, Q4H PRN  LORazepam (ATIVAN) tablet 2 mg, 2 mg, Oral, Q4H PRN **AND** haloperidol (HALDOL) tablet 5 mg, 5 mg, Oral, Q4H PRN  hydroxychloroquine (PLAQUENIL) tablet 200 mg, 200 mg, Oral, Daily  gabapentin (NEURONTIN) capsule 900 mg, 900 mg, Oral, TID  apixaban (ELIQUIS) tablet 10 mg, 10 mg, Oral, BID    ASSESSMENT  Bipolar disorder, current episode manic, severe    PLAN  Patients symptoms show modest improvement  Increase risperidone to 0.5 mg nightly  Monitor need and frequency of as needed medication  Maintain line of sight secondary to shoulder sling  Encourage participation in groups and milieu  attempt to develop insight  Psycho-education conducted. Supportive Therapy conducted. Probable discharge is undetermined at this time  Follow-up daily while on the inpatient unit    **This report has been created using voice recognition software. It may contain minor errors which are inherent in voice recognition technology. **  I independently saw and evaluated the patient. I reviewed the midlevel provider's documentation above. Any additional comments or changes to the midlevel provider's documentation are stated below otherwise agree with assessment.             The patient has been taking his medications. He denies any side effects but he has also not noticed any benefit. The patient does not believe he is experiencing any active SLE symptoms other than occasional joint stiffness which may be due to deconditioning. The patient is currently denying any suicidal ideation. He also denies any psychotic phenomena. No changes have been made to the patient's medications        PLAN  Medications as noted above  Attempt to develop insight  Psycho-education conducted. Supportive Therapy conducted.   Probable discharge is as noted above  Follow-up daily while on inpatient unit     Electronically signed by Maria De Jesus Pisano MD on 4/26/21 at 4:05 PM EDT

## 2021-04-26 NOTE — BH NOTE
Line of sight  Patient is active in [place], not wearing sling, no self harm noted. Line of sight monitoring maintained.

## 2021-04-26 NOTE — PLAN OF CARE
Problem: Altered Mood, Psychotic Behavior:  Goal: Able to verbalize reality based thinking  Description: Able to verbalize reality based thinking  4/25/2021 2228 by Elle Elena RN  Outcome: Ongoing   Patient states they are not having thoughts of self harm at this time and verbally agrees to seek staff if feelings of harming self arise,patient reports pain in left,PRN meds given pt tolerated well. Patient behavior controlled and accepting of medications,brightened on approach states readiness to discharge home,denies audio hallucination and visual hallucinations patient eating and sleeping well. Staff will continue to monitor for safety and offer support as needed. Problem: Pain:  Goal: Pain level will decrease  Description: Pain level will decrease  4/25/2021 2228 by Elle Elena RN  Outcome: Ongoing  Patient taking as needed pain medication tolerated well.
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
## 2021-04-26 NOTE — GROUP NOTE
Group Therapy Note    Date: 4/26/2021    Group Start Time: 1100  Group End Time: 5250  Group Topic: Recreational    [facility]    Marleni Cutler        Group Therapy Note    Attendees: 4/11         Patient's Goal:  To increase positive coping skills     Notes:      Status After Intervention:  Improved    Participation Level:  Active Listener and Interactive    Participation Quality: Appropriate, Attentive and Sharing      Speech:  normal      Thought Process/Content: Logical  Linear      Affective Functioning: Congruent      Mood: euthymic      Level of consciousness:  Alert, Oriented x4 and Attentive      Response to Learning: Able to verbalize current knowledge/experience, Able to verbalize/acknowledge new learning, Able to retain information and Progressing to goal      Endings: None Reported    Modes of Intervention: Education, Support, Socialization, Exploration, Clarifying, Problem-solving and Activity      Discipline Responsible: Psychoeducational Specialist      Signature:  Marleni Cutler

## 2021-04-26 NOTE — BH NOTE
Line of sight  Patient in the dayroom, arm wrap in place and is wearing a sling at this time. Line of sight monitoring maintained.

## 2021-04-26 NOTE — GROUP NOTE
Group Therapy Note    Date: 4/26/2021    Group Start Time: 1600  Group End Time: 3365  Group Topic: Healthy Living/Wellness    DAKOTAH iHcks        Group Therapy Note    Attendees: 6/13         Patient's Goal:  To attend and participate in group therapy. Notes: Anger    Status After Intervention:  Unchanged    Participation Level:  Active Listener and Interactive    Participation Quality: Appropriate, Attentive, Sharing and Supportive      Speech:  normal      Thought Process/Content: Logical  Linear  Delusional      Affective Functioning: Congruent      Mood: euthymic      Level of consciousness:  Oriented x4      Response to Learning: Able to verbalize current knowledge/experience and Able to verbalize/acknowledge new learning      Endings: None Reported    Modes of Intervention: Education, Support and Socialization      Discipline Responsible: Behavorial Health Tech      Signature:  Elza Hicks

## 2021-04-27 PROCEDURE — 99231 SBSQ HOSP IP/OBS SF/LOW 25: CPT | Performed by: NURSE PRACTITIONER

## 2021-04-27 PROCEDURE — 1240000000 HC EMOTIONAL WELLNESS R&B

## 2021-04-27 PROCEDURE — 6370000000 HC RX 637 (ALT 250 FOR IP): Performed by: INTERNAL MEDICINE

## 2021-04-27 PROCEDURE — 6370000000 HC RX 637 (ALT 250 FOR IP): Performed by: PSYCHIATRY & NEUROLOGY

## 2021-04-27 PROCEDURE — 6370000000 HC RX 637 (ALT 250 FOR IP): Performed by: NURSE PRACTITIONER

## 2021-04-27 RX ADMIN — GABAPENTIN 900 MG: 300 CAPSULE ORAL at 15:10

## 2021-04-27 RX ADMIN — APIXABAN 10 MG: 5 TABLET, FILM COATED ORAL at 08:40

## 2021-04-27 RX ADMIN — RISPERIDONE 0.5 MG: 1 TABLET ORAL at 21:23

## 2021-04-27 RX ADMIN — DIVALPROEX SODIUM 500 MG: 500 TABLET, EXTENDED RELEASE ORAL at 08:40

## 2021-04-27 RX ADMIN — GABAPENTIN 900 MG: 300 CAPSULE ORAL at 21:22

## 2021-04-27 RX ADMIN — APIXABAN 5 MG: 5 TABLET, FILM COATED ORAL at 21:21

## 2021-04-27 RX ADMIN — OXYCODONE HYDROCHLORIDE AND ACETAMINOPHEN 1 TABLET: 5; 325 TABLET ORAL at 21:31

## 2021-04-27 RX ADMIN — GABAPENTIN 900 MG: 300 CAPSULE ORAL at 08:38

## 2021-04-27 RX ADMIN — HYDROXYCHLOROQUINE SULFATE 200 MG: 200 TABLET ORAL at 08:39

## 2021-04-27 ASSESSMENT — PAIN - FUNCTIONAL ASSESSMENT
PAIN_FUNCTIONAL_ASSESSMENT: 0-10
PAIN_FUNCTIONAL_ASSESSMENT: 0-10

## 2021-04-27 ASSESSMENT — PAIN SCALES - GENERAL
PAINLEVEL_OUTOF10: 7
PAINLEVEL_OUTOF10: 7

## 2021-04-27 NOTE — GROUP NOTE
Group Therapy Note    Date: 4/27/2021    Group Start Time: 1600  Group End Time: 9722  Group Topic: Healthy Living/Wellness    DAKOTAH Salazar        Group Therapy Note    Attendees: 4/14         Patient's Goal:  To attend and participate in group therapy    Notes:  Daily Stressors    Status After Intervention:  Unchanged    Participation Level:  Active Listener and Interactive    Participation Quality: Appropriate, Attentive, Sharing and Supportive      Speech:  normal      Thought Process/Content: Logical  Linear      Affective Functioning: Congruent      Mood: euthymic      Level of consciousness:  Oriented x4      Response to Learning: Able to verbalize current knowledge/experience and Able to verbalize/acknowledge new learning      Endings: None Reported    Modes of Intervention: Education and Support      Discipline Responsible: Behavorial Health Tech      Signature:  Yenifer Salazar
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
## 2021-04-27 NOTE — SUICIDE SAFETY PLAN
SAFETY PLAN    A suicide Safety Plan is a document that supports someone when they are having thoughts of suicide. Warning Signs that indicate a suicidal crisis may be developing: What (situations, thoughts, feelings, body sensations, behaviors, etc.) do you experience that lets you know you are beginning to think about suicide? 1. ***  2. ***  3. ***    Internal Coping Strategies:  What things can I do (relaxation techniques, hobbies, physical activities, etc.) to take my mind off my problems without contacting another person? 1. ***  2. ***  3. ***    People and social settings that provide distraction: Who can I call or where can I go to distract me? 1. Name: ***  Phone: ***  2. Name: ***  Phone: ***   3. Place: ***            4. Place: ***    People whom I can ask for help: Who can I call when I need help - for example, friends, family, clergy, someone else? 1. Name: ***                Phone: ***  2. Name: ***  Phone: ***  3. Name: ***  Phone: ***    Professionals or [facility] agencies I can contact during a crisis: Who can I call for help - for example, my doctor, my psychiatrist, my psychologist, a mental health provider, a suicide hotline? 1. Clinician Name: ***   Phone: ***      Clinician Pager or Emergency Contact #: ***    2. Clinician Name: ***   Phone: ***      Clinician Pager or Emergency Contact #: ***    3. Suicide Prevention Lifeline: 0-899-796-TALK (7705)    4. [address] [place] [address] Emergency Services -  for example, [address] [facility] Emergency suicide hotline, [facility] Hotline: ***      Emergency Services Address: ***      Emergency Services Phone: ***    Making the environment safe: How can I make my environment (house/apartment/living space) safer? For example, can I remove guns, medications, and other items?   1. ***  2. ***

## 2021-04-27 NOTE — GROUP NOTE
Group Therapy Note    Date: 4/27/2021    Group Start Time: 1100  Group End Time: 2650  Group Topic: Cognitive Skills    [facility]    Avinash Guerrero        Group Therapy Note    Attendees: 4/11         Patient's Goal:  To demonstrate improved social skills     Notes:  Patient was pleasant and participated as directed     Status After Intervention:  Improved    Participation Level:  Active Listener and Interactive    Participation Quality: Appropriate, Attentive, Sharing and Supportive      Speech:  normal      Thought Process/Content: Logical      Affective Functioning: Congruent      Mood: euthymic      Level of consciousness:  Alert, Oriented x4 and Attentive      Response to Learning: Able to verbalize current knowledge/experience, Able to verbalize/acknowledge new learning, Capable of insight and Progressing to goal      Endings: None Reported    Modes of Intervention: Education, Support, Socialization, Exploration, Clarifying and Problem-solving      Discipline Responsible: Psychoeducational Specialist      Signature:  Avinash Guerrero

## 2021-04-27 NOTE — PLAN OF CARE
Problem: Altered Mood, Psychotic Behavior:  Goal: Able to demonstrate trust by eating, participating in treatment and following staff's direction  Description: Able to demonstrate trust by eating, participating in treatment and following staff's direction  Outcome: Completed  Note: Pt eats and participates in groups. Follows all commands. Denies thoughts to harm self or others   Goal: Able to verbalize reality based thinking  Description: Able to verbalize reality based thinking  Outcome: Completed  Note: Pt denies suicidal thoughts, pt is able to hold attention and follow commands. Pt demonstrates congruent thoughts and verbalize reality based thinking  Goal: Absence of self-harm  Description: Absence of self-harm  4/27/2021 1428 by Lia Ingram  Outcome: Completed  Note: Pt denies thoughts to harm self and others  4/27/2021 1018 by Lia Ingram  Note: Pt denies thoughts to harm self or others. Pt care is ongoing  Intervention: Assess psychotic behavior  Note: Pt actively participates in group activities, is able to express thoughts and feelings that are grounded in reality, pt denies thoughts to harm self or other     Problem: Altered Mood, Psychotic Behavior:  Goal: Able to verbalize reality based thinking  Description: Able to verbalize reality based thinking  Outcome: Completed  Note: Pt denies suicidal thoughts, pt is able to hold attention and follow commands. Pt demonstrates congruent thoughts and verbalize reality based thinking     Problem: Altered Mood, Psychotic Behavior:  Goal: Absence of self-harm  Description: Absence of self-harm  4/27/2021 1428 by Lia Ingarm  Outcome: Completed  Note: Pt denies thoughts to harm self and others  4/27/2021 1018 by Lia Ingram  Note: Pt denies thoughts to harm self or others.  Pt care is ongoing     Problem: Altered Mood, Psychotic Behavior:  Intervention: Assess psychotic behavior  Note: Pt actively participates in group activities, is able to express thoughts and feelings that are grounded in reality, pt denies thoughts to harm self or other     Problem: Pain:  Goal: Pain level will decrease  Description: Pain level will decrease  Outcome: Ongoing  Note: Patient appropriately expresses need for pain medication at the appropriate time  Goal: Control of acute pain  Description: Control of acute pain  Outcome: Ongoing  Note: Pt takes gabapentin at scheduled time  Goal: Control of chronic pain  Description: Control of chronic pain  Outcome: Ongoing  Note: Pt states his pain goal is met. Intervention: Opioid analgesia side-effects  Note: Pt has appropriate management of pain. Pt's pain goal is being met     Problem: Pain:  Goal: Control of acute pain  Description: Control of acute pain  Outcome: Ongoing  Note: Pt takes gabapentin at scheduled time     Problem: Pain:  Goal: Control of chronic pain  Description: Control of chronic pain  Outcome: Ongoing  Note: Pt states his pain goal is met. Problem: Pain:  Intervention: Opioid analgesia side-effects  Note: Pt has appropriate management of pain.  Pt's pain goal is being met

## 2021-04-27 NOTE — GROUP NOTE
Group Therapy Note    Date: 4/27/2021    Group Start Time: 1330  Group End Time: 2581  Group Topic: Cognitive Skills    DAKOTAH BHJOSELITO Gallo, CTRS        Group Therapy Note    Attendees: 4         Patient's Goal:  To improve coping skills     Notes:  Pt was pleasant and participated well     Status After Intervention:  Improved    Participation Level:  Active Listener    Participation Quality: appropriate       Speech: normal      Thought Process/Content: normal       Affective Functioning: congruent      Mood:euthymic       Level of consciousness:  Alert      Response to Learning: Progressing to goal      Endings: None Reported    Modes of Intervention: Education, Support and Problem-solving      Discipline Responsible: Psychoeducational Specialist      Signature:  Chloe Pineda

## 2021-04-27 NOTE — CARE COORDINATION
KLEVER spoke with pts mother regarding information needing to be sent to pts  Navneet Hinkle that pt is currently inpatient at [facility]. [place] Siddiqui.

## 2021-04-27 NOTE — PLAN OF CARE
Problem: Altered Mood, Psychotic Behavior:  Goal: Able to demonstrate trust by eating, participating in treatment and following staff's direction  Description: Able to demonstrate trust by eating, participating in treatment and following staff's direction  4/26/2021 2146 by Anh Shannon RN  Outcome: Ongoing     Problem: Altered Mood, Psychotic Behavior:  Goal: Able to verbalize reality based thinking  Description: Able to verbalize reality based thinking  4/26/2021 2146 by Anh Shannon RN  Outcome: Ongoing  Note: Pt able told reality based conversations     Problem: Altered Mood, Psychotic Behavior:  Goal: Absence of self-harm  Description: Absence of self-harm  4/26/2021 2146 by Anh Shannon RN  Outcome: Ongoing  Note: Pt free from harm. Denies suicidal/homicidal ideation and visual/auditory hallucinations. Pt is calm , pleasant,and cooperative. Pt is medication compliant. . Out in dayroom watching television and aloof to peers. 15 minute safety rounds maintained per protocol. Will continue to monitor     Problem: Pain:  Goal: Pain level will decrease  Description: Pain level will decrease  Outcome: Ongoing  Note: Pt Is endorsing pain.  Medicated per MAR     Problem: Pain:  Goal: Control of acute pain  Description: Control of acute pain  Outcome: Ongoing     Problem: Pain:  Goal: Control of chronic pain  Description: Control of chronic pain  Outcome: Ongoing

## 2021-04-27 NOTE — GROUP NOTE
Group Therapy Note    Date: 4/27/2021    Group Start Time: 1430  Group End Time: 0628  Group Topic: Cognitive Skills    [facility]    Maira Partida        Group Therapy Note    Attendees: 5/17         Patient's Goal:  To demonstrate improved interpersonal skills     Notes:  Patient was pleasant and appropriate throughout the session     Status After Intervention:  Improved    Participation Level:  Active Listener and Interactive    Participation Quality: Appropriate, Attentive, Sharing and Supportive      Speech:  normal      Thought Process/Content: Logical      Affective Functioning: Congruent      Mood: euthymic      Level of consciousness:  Alert, Oriented x4 and Attentive      Response to Learning: Able to verbalize current knowledge/experience, Able to verbalize/acknowledge new learning, Capable of insight and Progressing to goal      Endings: None Reported    Modes of Intervention: Education, Support, Socialization, Exploration, Clarifying and Problem-solving      Discipline Responsible: Psychoeducational Specialist      Signature:  Maira Partida

## 2021-04-27 NOTE — CARE COORDINATION
SW completed GALDINO for  and sister with pt. KLEVER faxed a letter to pts  with pts admit date and discharge to be determined. Hard copy was placed in pts chart.

## 2021-04-28 LAB
ANION GAP SERPL CALCULATED.3IONS-SCNC: 10 MMOL/L (ref 9–17)
BUN BLDV-MCNC: 14 MG/DL (ref 6–20)
BUN/CREAT BLD: ABNORMAL (ref 9–20)
CALCIUM SERPL-MCNC: 9.4 MG/DL (ref 8.6–10.4)
CHLORIDE BLD-SCNC: 103 MMOL/L (ref 98–107)
CO2: 29 MMOL/L (ref 20–31)
CREAT SERPL-MCNC: 0.67 MG/DL (ref 0.7–1.2)
GFR AFRICAN AMERICAN: >60 ML/MIN
GFR NON-AFRICAN AMERICAN: >60 ML/MIN
GFR SERPL CREATININE-BSD FRML MDRD: ABNORMAL ML/MIN/{1.73_M2}
GFR SERPL CREATININE-BSD FRML MDRD: ABNORMAL ML/MIN/{1.73_M2}
GLUCOSE BLD-MCNC: 91 MG/DL (ref 70–99)
HCT VFR BLD CALC: 34.1 % (ref 41–53)
HEMOGLOBIN: 10.8 G/DL (ref 13.5–17.5)
MAGNESIUM: 2 MG/DL (ref 1.6–2.6)
MCH RBC QN AUTO: 29.5 PG (ref 26–34)
MCHC RBC AUTO-ENTMCNC: 31.7 G/DL (ref 31–37)
MCV RBC AUTO: 93.1 FL (ref 80–100)
NRBC AUTOMATED: ABNORMAL PER 100 WBC
PDW BLD-RTO: 15.1 % (ref 11.5–14.9)
PLATELET # BLD: 491 K/UL (ref 150–450)
PMV BLD AUTO: 6.5 FL (ref 6–12)
POTASSIUM SERPL-SCNC: 3.5 MMOL/L (ref 3.7–5.3)
RBC # BLD: 3.67 M/UL (ref 4.5–5.9)
SODIUM BLD-SCNC: 142 MMOL/L (ref 135–144)
WBC # BLD: 5.9 K/UL (ref 3.5–11)

## 2021-04-28 PROCEDURE — 1240000000 HC EMOTIONAL WELLNESS R&B

## 2021-04-28 PROCEDURE — 6370000000 HC RX 637 (ALT 250 FOR IP): Performed by: INTERNAL MEDICINE

## 2021-04-28 PROCEDURE — 99232 SBSQ HOSP IP/OBS MODERATE 35: CPT | Performed by: PSYCHIATRY & NEUROLOGY

## 2021-04-28 PROCEDURE — 83735 ASSAY OF MAGNESIUM: CPT

## 2021-04-28 PROCEDURE — 85027 COMPLETE CBC AUTOMATED: CPT

## 2021-04-28 PROCEDURE — 36415 COLL VENOUS BLD VENIPUNCTURE: CPT

## 2021-04-28 PROCEDURE — 6370000000 HC RX 637 (ALT 250 FOR IP): Performed by: NURSE PRACTITIONER

## 2021-04-28 PROCEDURE — 99232 SBSQ HOSP IP/OBS MODERATE 35: CPT | Performed by: INTERNAL MEDICINE

## 2021-04-28 PROCEDURE — 90833 PSYTX W PT W E/M 30 MIN: CPT | Performed by: PSYCHIATRY & NEUROLOGY

## 2021-04-28 PROCEDURE — 80048 BASIC METABOLIC PNL TOTAL CA: CPT

## 2021-04-28 PROCEDURE — 6370000000 HC RX 637 (ALT 250 FOR IP): Performed by: PSYCHIATRY & NEUROLOGY

## 2021-04-28 RX ORDER — RISPERIDONE 1 MG/1
1 TABLET, FILM COATED ORAL NIGHTLY
Status: DISCONTINUED | OUTPATIENT
Start: 2021-04-28 | End: 2021-04-29

## 2021-04-28 RX ORDER — GINSENG 100 MG
CAPSULE ORAL DAILY
Status: DISCONTINUED | OUTPATIENT
Start: 2021-04-28 | End: 2021-04-30 | Stop reason: HOSPADM

## 2021-04-28 RX ADMIN — GABAPENTIN 900 MG: 300 CAPSULE ORAL at 08:25

## 2021-04-28 RX ADMIN — APIXABAN 5 MG: 5 TABLET, FILM COATED ORAL at 21:13

## 2021-04-28 RX ADMIN — APIXABAN 5 MG: 5 TABLET, FILM COATED ORAL at 08:26

## 2021-04-28 RX ADMIN — HYDROXYCHLOROQUINE SULFATE 200 MG: 200 TABLET ORAL at 08:26

## 2021-04-28 RX ADMIN — DIVALPROEX SODIUM 500 MG: 500 TABLET, EXTENDED RELEASE ORAL at 08:26

## 2021-04-28 RX ADMIN — GABAPENTIN 900 MG: 300 CAPSULE ORAL at 15:38

## 2021-04-28 RX ADMIN — OXYCODONE HYDROCHLORIDE AND ACETAMINOPHEN 1 TABLET: 5; 325 TABLET ORAL at 21:53

## 2021-04-28 RX ADMIN — RISPERIDONE 1 MG: 1 TABLET ORAL at 21:13

## 2021-04-28 RX ADMIN — GABAPENTIN 900 MG: 300 CAPSULE ORAL at 21:12

## 2021-04-28 ASSESSMENT — PAIN SCALES - GENERAL
PAINLEVEL_OUTOF10: 7
PAINLEVEL_OUTOF10: 3

## 2021-04-28 NOTE — CARE COORDINATION
KLEVER spoke with patient's mother Daisy Lucio this date. Daisy Lucio states she wants Dr. Susan Ahuja to consult/see patient while at [facility] as he is \"the best plastic surgeon in the city. \" Daisy Lucio states she has contacted this doctor who has told her of the hospital procedure for asking for medical consult, as she is concerned about the state of patient's physical health and his arm issue. KLEVER offered reassurance, validation and support, pt mom states she is \"angry\" about \"how my son's care has been handled,\" and of pt past interactions with mental health providers and \"system failures. \" SW offered psychoeducation on support given to patient, including support with d/c planning and link to appropriate community resources, also discussed with mom supports given while inpatient to treat mental health including medication management, supportive groups, interactions with treatment providers. KLEVER assured pt mom that KLEVER will update MD on her concerns with patient physical health as well as mental health. Pt mother also voiced concerns about patient upcoming court date tomorrow, stating Westley Lynn are we going to do if he goes to intermediate for 8-16 years, there's going to be no one giving him care in intermediate. \" KLEVER offered ongoing support and encouragement, advocacy. KLEVER updated MD on above concerns of pt mom regarding pt needs.

## 2021-04-28 NOTE — GROUP NOTE
Group Therapy Note    Date: 4/28/2021    Group Start Time: 1100  Group End Time: 1190  Group Topic: Cognitive Skills    JAX Nam        Group Therapy Note    Attendees: 2/8         Patient's Goal:  To improve decision making skills     Notes:   Pt was pleasant and participated well with assisstance     Status After Intervention:  Improved    Participation Level:  Active Listener and Interactive    Participation Quality: Appropriate      Speech:  normal      Thought Process/Content: logical      Affective Functioning: Flat      Mood: depressed      Level of consciousness:  Alert      Response to Learning: Able to verbalize current knowledge/experience and Progressing to goal      Endings: None Reported    Modes of Intervention: Education, Support and Problem-solving      Discipline Responsible: Psychoeducational Specialist      Signature:  Chris Mascorro

## 2021-04-28 NOTE — BH NOTE
Dr. Madison Christie returned call to writer, he was able to view recent pictures in Epic, and expressed he anticipated seeing Mr. Jennifer Mann at his appointment on 5/4/21 in the clinic at [place]. V's. Dr. Madison Christie noted to use a warm damp compress laid over the wound to soften the \"hardened areas around the edges\". He noted to continue to cleanse the area with Hibiclens and water and pat dry. He noted to continue to use bacitracin impregnated adaptic, covering the wound, with 3 fluffs and wrapped with gauze wrap and to change dressing daily.

## 2021-04-28 NOTE — BH NOTE
Late Entry for 4/27/2021 7am-7pm    Dressing on the left forearm was changed, wound was cleansed. Skin graft was observed to be pale pink in color with some sloughing observed in the distal and proximal/medial areas of the wound as well as in the deep center. There was a small amount of yellow/brown drainage observed on the old dressing appearing to come from the center of the dressing. No odor was detected. Wound was cleansed and covered. There was increased edema observed to left elbow and above incision as well as extensive edema with +2 pitting around wrist, hand and all 5 fingers. Nail bed of thumb had visible dried red blood under nail. Mr. Brandi Red demonstrates limited movement of hand, wrist to fingers. Left radial pulses were not palpable at this time. This information was verbally reported to the oncoming assigned nurse by this writer.

## 2021-04-28 NOTE — BH NOTE
Secured message sent to Internal Med MD to verify dressing change orders from Dr. Vaishali Benjamin. Verification for orders received.

## 2021-04-28 NOTE — BH NOTE
Spoke with Darylene Erm, Dr. Whitlock Paci  to notify about the consult order. She advised this writer Mr. Nolberto Giraldo has an appointment on May 4, 2021 at the [facility]. 's clinic for a follow up with Dr. Charlee Bowling. Darylene Erm took this units phone number and noted she would pass it on to Dr. Charlee Bowling.

## 2021-04-28 NOTE — PROGRESS NOTES
Message received from ДМИТРИЙ Reeder, who reported that dayshift nurse was concerned about coloration of skin graft, saying that it appeared to be more ashen in color and not the healthy pink as it was 2 days prior. Additionally, patient was noted to have increasing edema of his left hand and elbow and staff was having difficulty with palpating a left radial pulse. Writer in to see patient. Wound undressed and examined; new images available in media tab. Left radial pulse strong with Doppler. I believe that it is difficult to palpate his pulse due to edema of left wrist.  Patient reports difficulty pronating left forearm, stating that it feels as if his entire arm would split open if he tried to rotate it. This  may also be related to increased edema. Wound noted to have creamy drainage, especially at the distal portion. Consult ordered for Dr. Katharine Gowers, plastic/reconstructive surgeon, who cared for patient at [facility]. Case discussed with Dr. Jorge Hester at 10:40 PM.  ДМИТРИЙ Reeder reports speaking with the on-call physician at [address] [place] [address] regarding new consult. Rani states she was instructed to call the office after 8:30 AM and discuss case with Dr. Katharine Gowers at that time.

## 2021-04-28 NOTE — GROUP NOTE
Group Therapy Note    Date: 4/28/2021    Group Start Time: 0900  Group End Time: 0930  Group Topic: Community Meeting    DAKOTAH Pham, [address]         Group Therapy Note    Attendees: 6/17         Patient's Goal:  To increase social interaction and to explore and identify short term goals r/t wellness and recovery. RT discussed group schedule and unit structure/resources and encouraged pts to be engaged in their treatment. Pts were given opportunities to ask questions. Notes: Pt participated in group task and was able to identify short term goals r/t wellness and recovery. Pt is pleasant and supportive of peers. Status After Intervention:  Improved     Participation Level:  Active Listener and Interactive     Participation Quality: Appropriate, Attentive, Sharing         Speech:   Normal     Thought Process/Content: Logical, linear     Affective Functioning: Blunted     Mood: euthymic        Level of consciousness:  Alert, and Attentive        Response to Learning: Able to verbalize current knowledge/experience, Able to verbalize/acknowledge new learning, and Progressing to goal        Endings: None Reported     Modes of Intervention: Education, Support, Socialization, Exploration, Clarifying and Problem-solving        Discipline Responsible: Psychoeducational Specialist        Signature:  Ty Dias
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
## 2021-04-28 NOTE — FLOWSHEET NOTE
*Patient participated in the [address] [employer]       04/28/21 1419   Encounter Summary   Services provided to: Patient   Referral/Consult From: Rounding   Continue Visiting   (4/28/21)   Complexity of Encounter Moderate   Length of Encounter 30 minutes   Spiritual Assessment Completed Yes   Spiritual/Pentecostal   Type Spiritual support   Assessment Calm; Approachable   Intervention Active listening;Prayer   Outcome Receptive;Engaged in conversation;Expressed gratitude
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
## 2021-04-28 NOTE — BH NOTE
Patient brought shower supplies back to the nurses station, staff put items in locker and noticed medications in locker. Items sent down to safe in bag [id number]. Charge Nurse informed.

## 2021-04-28 NOTE — BH NOTE
Was informed by day shift that there were some concerns with the appearance of the skin graft during the dressing change. It was felt that the color of the skin appeared to be more ashen that approximately two days prior when the skin was described as looking a \" healthy pink color\". The arm also had more edema and the pulses were very difficult to palpate. Staff was unable to discern if this was due to inflamamation or if there was a vascular problem. Upon assessment 2+ edema In the L hand extending up to the L elbow. The extremity was warm to the touch. The dressing was dry, clean  and intact. The pt denied an increase in pain Dr. Nurys Carrasquillo was notified at 2048 on 4/27/21. He ordered an internal med consult, vascular consult , and for the on call internal med NP to come assess. Theinternal  NP ws notified at 2110. During this assessment several new pictures were added to the media file. This staff member was also able to obtain a radial pulse with the Doppler. Additionally during this assessment it was determined both through examination and discussion with the pt that there was an increase in inflammation and drainage. Also the Pt stated that it had become more difficult to rotate that hand. The NP notified Dr. Nando Tavera who recommended that the surgeon be contacted. He felt that due to finding a strong pulse that it wasn't vascular and it  was unnecessary to consult with vascular. Dr Aminah Portillo who was on call for Dr. Lashaun Burgess was contacted at [phone].  After reviewing this information with him it was determined to contact Dr. Lashaun Burgess at his office at [address] [facility] on 4/28/21

## 2021-04-29 PROCEDURE — 1240000000 HC EMOTIONAL WELLNESS R&B

## 2021-04-29 PROCEDURE — 99232 SBSQ HOSP IP/OBS MODERATE 35: CPT | Performed by: PSYCHIATRY & NEUROLOGY

## 2021-04-29 PROCEDURE — 6370000000 HC RX 637 (ALT 250 FOR IP): Performed by: PSYCHIATRY & NEUROLOGY

## 2021-04-29 PROCEDURE — 6370000000 HC RX 637 (ALT 250 FOR IP): Performed by: INTERNAL MEDICINE

## 2021-04-29 PROCEDURE — 99231 SBSQ HOSP IP/OBS SF/LOW 25: CPT | Performed by: INTERNAL MEDICINE

## 2021-04-29 PROCEDURE — 99211 OFF/OP EST MAY X REQ PHY/QHP: CPT

## 2021-04-29 PROCEDURE — 6370000000 HC RX 637 (ALT 250 FOR IP): Performed by: NURSE PRACTITIONER

## 2021-04-29 PROCEDURE — 90833 PSYTX W PT W E/M 30 MIN: CPT | Performed by: PSYCHIATRY & NEUROLOGY

## 2021-04-29 RX ADMIN — OXYCODONE HYDROCHLORIDE AND ACETAMINOPHEN 1 TABLET: 5; 325 TABLET ORAL at 22:13

## 2021-04-29 RX ADMIN — HYDROXYCHLOROQUINE SULFATE 200 MG: 200 TABLET ORAL at 09:43

## 2021-04-29 RX ADMIN — GABAPENTIN 900 MG: 300 CAPSULE ORAL at 22:13

## 2021-04-29 RX ADMIN — BACITRACIN: 500 OINTMENT TOPICAL at 14:34

## 2021-04-29 RX ADMIN — APIXABAN 5 MG: 5 TABLET, FILM COATED ORAL at 22:13

## 2021-04-29 RX ADMIN — GABAPENTIN 900 MG: 300 CAPSULE ORAL at 09:44

## 2021-04-29 RX ADMIN — DIVALPROEX SODIUM 500 MG: 500 TABLET, EXTENDED RELEASE ORAL at 09:44

## 2021-04-29 RX ADMIN — GABAPENTIN 900 MG: 300 CAPSULE ORAL at 14:28

## 2021-04-29 RX ADMIN — APIXABAN 5 MG: 5 TABLET, FILM COATED ORAL at 09:43

## 2021-04-29 ASSESSMENT — PAIN SCALES - GENERAL: PAINLEVEL_OUTOF10: 7

## 2021-04-29 NOTE — GROUP NOTE
Group Therapy Note    Date: 4/29/2021    Group Start Time: 1330  Group End Time: 5332  Group Topic: Cognitive Skills    [facility]    Celena Becker, BRYCES        Group Therapy Note    Attendees: 6/18         Patient's Goal:  To increase social interaction and to practice problem solving, memory, and communication skills        Notes: Pt participated fully in group . Pt was able to practice problem solving, memory, and communication skills. Pt was pleasant and supportive of peers. Status After Intervention:  Improved         Participation Level:  Active Listener and Interactive     Participation Quality: Appropriate, Attentive, Sharing and Supportive        Speech:  Normal        Thought Process/Content: Logical , linear        Affective Functioning: Congruent, brightens      Mood: euthymic         Level of consciousness:  Alert, Oriented x4 and Attentive        Response to Learning: Able to verbalize current knowledge/experience, Able to verbalize/acknowledge new learning,  and Progressing to goal        Endings: None Reported     Modes of Intervention: Education, Support, Socialization, Exploration, Clarifying and Problem-solving        Discipline Responsible: Psychoeducational Specialist        Signature:  Rory Avendaño

## 2021-04-29 NOTE — PROGRESS NOTES
[facility] Wound Ostomy Continence Nursing        NAME:  Jacklyn  [facility] RECORD NUMBER:  [id number]  AGE: 47 y.o. GENDER: male  : 1980  TODAY'S DATE:  2021      [employer] [employer] nursing consult noted. Attempted to visit pt for wound eval. Per nursing, the dressing was just changed about one hour ago. Will reschedule wound eval visit tomorrow.      Lilo VINCENTN, RN, [facility] [address]  Wound, Ostomy, and Continence Nursing  [phone]
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
## 2021-04-29 NOTE — PROGRESS NOTES
Behavioral Services                                              Medicare Re-Certification    I certify that the inpatient psychiatric hospital services furnished since the previous certification/re-certification were, and continue to be, medically necessary for;    [x] (1) Treatment which could reasonably be expected to improve the patient's condition,    [x] (2) Or for diagnostic study. Estimated length of stay/service 1 to 3 days    Plan for post-hospital care Home with mother in outpatient community mental health follow-up    This patient continues to need, on a daily basis, active treatment furnished directly by or requiring the supervision of inpatient psychiatric personnel.     Electronically signed by Zackary Odom MD on 4/29/2021 at 5:38 PM

## 2021-04-29 NOTE — CARE COORDINATION
KLEVER made attempt to contact Mally with [facility] and [address] to schedule follow-up appointment. KLEVER left call back number to be contacted to schedule.
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
## 2021-04-29 NOTE — PLAN OF CARE
Problem: Pain:  Goal: Pain level will decrease  Description: Pain level will decrease  Outcome: Ongoing  Note: Patient denies pain at this time, denies need for any pain medications. Goal: Control of acute pain  Description: Control of acute pain  Outcome: Ongoing  Goal: Control of chronic pain  Description: Control of chronic pain  Outcome: Ongoing     Problem: Skin Integrity:  Goal: Will show no infection signs and symptoms  Description: Will show no infection signs and symptoms  Outcome: Ongoing  Note: Patients dressing changed to left arm changed. Patient tolerated change well, no redness or swelling to the sight noted. Wound was clean and intact. Small amount of drainage noted to old bandage removed. Patients hand is swollen and peeling, patient reports his hand has been swollen \"for a while. \"  Graft site to left leg is noted to be clean and intact. Site to right leg is clean. Patient denies any needs at this time related to his wounds and dressing changes.   Goal: Absence of new skin breakdown  Description: Absence of new skin breakdown  Outcome: Ongoing

## 2021-04-30 VITALS
TEMPERATURE: 98 F | DIASTOLIC BLOOD PRESSURE: 61 MMHG | HEART RATE: 102 BPM | HEIGHT: 68 IN | SYSTOLIC BLOOD PRESSURE: 142 MMHG | WEIGHT: 142 LBS | RESPIRATION RATE: 16 BRPM | BODY MASS INDEX: 21.52 KG/M2

## 2021-04-30 PROCEDURE — 6370000000 HC RX 637 (ALT 250 FOR IP): Performed by: NURSE PRACTITIONER

## 2021-04-30 PROCEDURE — 6370000000 HC RX 637 (ALT 250 FOR IP): Performed by: PSYCHIATRY & NEUROLOGY

## 2021-04-30 PROCEDURE — 99239 HOSP IP/OBS DSCHRG MGMT >30: CPT | Performed by: PSYCHIATRY & NEUROLOGY

## 2021-04-30 PROCEDURE — 99214 OFFICE O/P EST MOD 30 MIN: CPT

## 2021-04-30 PROCEDURE — 6370000000 HC RX 637 (ALT 250 FOR IP): Performed by: INTERNAL MEDICINE

## 2021-04-30 RX ORDER — DIVALPROEX SODIUM 500 MG/1
500 TABLET, EXTENDED RELEASE ORAL DAILY
Qty: 30 TABLET | Refills: 0 | Status: SHIPPED | OUTPATIENT
Start: 2021-05-01 | End: 2021-10-25 | Stop reason: DRUGHIGH

## 2021-04-30 RX ORDER — GABAPENTIN 300 MG/1
900 CAPSULE ORAL 3 TIMES DAILY
Qty: 90 CAPSULE | Refills: 0 | Status: SHIPPED | OUTPATIENT
Start: 2021-04-30 | End: 2021-10-25

## 2021-04-30 RX ORDER — GINSENG 100 MG
CAPSULE ORAL
Qty: 1 TUBE | Refills: 0 | Status: SHIPPED | OUTPATIENT
Start: 2021-04-30 | End: 2021-05-10

## 2021-04-30 RX ORDER — OXYCODONE HYDROCHLORIDE AND ACETAMINOPHEN 5; 325 MG/1; MG/1
1 TABLET ORAL 2 TIMES DAILY PRN
Qty: 6 TABLET | Refills: 0 | Status: SHIPPED | OUTPATIENT
Start: 2021-04-30 | End: 2021-05-03

## 2021-04-30 RX ADMIN — DIVALPROEX SODIUM 500 MG: 500 TABLET, EXTENDED RELEASE ORAL at 09:55

## 2021-04-30 RX ADMIN — APIXABAN 5 MG: 5 TABLET, FILM COATED ORAL at 09:55

## 2021-04-30 RX ADMIN — GABAPENTIN 900 MG: 300 CAPSULE ORAL at 09:55

## 2021-04-30 RX ADMIN — HYDROXYCHLOROQUINE SULFATE 200 MG: 200 TABLET ORAL at 09:56
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
## 2021-04-30 NOTE — BH NOTE
[facility][1]  Discharge Note    Pt discharged with followings belongings:   Dentures: None  Vision - Corrective Lenses: Glasses  Hearing Aid: None  Jewelry: Ring  Body Piercings Removed: N/A  Clothing: Pants, Shirt, Socks, Undergarments (Comment)  Were All Patient Medications Collected?: Not Applicable  Other Valuables: (sling)   Valuables sent home with patient. Valuables retrieved from safe, Security envelope number:  [id number] and returned to patient. Patient education on aftercare instructions: yes  Information faxed to [facility] by nurse Patient verbalize understanding of AVS:  yes. Pt discharged to private residence via mother without incident. Status EXAM upon discharge:  Status and Exam  Normal: No  Facial Expression: Flat  Affect: Appropriate  Level of Consciousness: Alert  Mood:Normal: No  Mood: Depressed, Anxious  Motor Activity:Normal: Yes  Motor Activity: Unusual Posture/Gait  Interview Behavior: Cooperative  Preception: [place] to Person, Helayne Bollard to Time, [place] to Place, [place] to Situation  Attention:Normal: No  Attention: Distractible  Thought Processes: Circumstantial  Thought Content:Normal: Yes  Thought Content: Preoccupations  Hallucinations: None  Delusions: No  Delusions: Persecution  Memory:Normal: No  Memory: Poor Recent  Insight and Judgment: Yes  Insight and Judgment: Poor Insight  Present Suicidal Ideation: No  Present Homicidal Ideation: No      Metabolic Screening:    No results found for: LABA1C    No results found for: CHOL  No results found for: TRIG  No results found for: HDL  No components found for: LDLCAL  No results found for: John Samson LPN      .

## 2021-04-30 NOTE — BH NOTE
patient was not given information as to the dangers of long term tobacco use.  Pt has no history of using tobacco.
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
## 2021-04-30 NOTE — GROUP NOTE
Group Therapy Note    Date: 4/30/2021    Group Start Time: 1100  Group End Time: 3373  Group Topic: Cognitive Skills    STELBERT Hylton, CTRS    Pt did not attend 1100 cognitive skills group d/t resting in room despite staff invitation to attend. 1:1 talk time offered as alternative to group session, pt declined.           Signature:  Juan Mcknight

## 2021-04-30 NOTE — PLAN OF CARE
Problem: Pain:  Goal: Pain level will decrease  Description: Pain level will decrease  4/30/2021 0504 by Efra Wise RN  Outcome: Ongoing     Problem: Depressive Behavior With or Without Suicide Precautions:  Goal: Able to verbalize acceptance of life and situations over which he or she has no control  Description: Able to verbalize acceptance of life and situations over which he or she has no control  4/30/2021 0504 by Efra Wise RN  Outcome: Ongoing     Problem: Depressive Behavior With or Without Suicide Precautions:  Goal: Able to verbalize and/or display a decrease in depressive symptoms  Description: Able to verbalize and/or display a decrease in depressive symptoms  4/30/2021 0504 by Efra Wise RN  Outcome: Ongoing   Patient denies suicidal homicidal ideation reports he will hopefully be going home tomorrow. Patient denies hallucinations and is medication complaint this shift. Patient is selectively social and cooperative with staff. Patient reports looking forward to good rest at home.

## 2021-05-04 ENCOUNTER — TELEPHONE (OUTPATIENT)
Dept: BURN CARE | Age: 41
End: 2021-05-04
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
## 2021-05-11 ENCOUNTER — OFFICE VISIT (OUTPATIENT)
Dept: BURN CARE | Age: 41
End: 2021-05-11
Payer: COMMERCIAL

## 2021-05-11 VITALS
SYSTOLIC BLOOD PRESSURE: 108 MMHG | WEIGHT: 160.8 LBS | DIASTOLIC BLOOD PRESSURE: 71 MMHG | BODY MASS INDEX: 24.37 KG/M2 | HEIGHT: 68 IN | HEART RATE: 81 BPM

## 2021-05-11 DIAGNOSIS — S51.832A: Primary | ICD-10-CM

## 2021-05-11 PROCEDURE — 99024 POSTOP FOLLOW-UP VISIT: CPT | Performed by: PLASTIC SURGERY

## 2021-05-11 PROCEDURE — 99202 OFFICE O/P NEW SF 15 MIN: CPT

## 2021-05-11 NOTE — LETTER
[facility], AN [facility]ILIATE OF [facility]  [facility] [address] [facility] [address]  Phone: [phone]  Fax: Irena Jennifer 634 Tabatha Wong MD        May 11, 2021     Patient: Cara Boss   YOB: 1980   Date of Visit: 5/11/2021       To Whom It May Concern:    Kristine Erickson was seen in our clinic today. If you have any questions or concerns, please don't hesitate to call.     Sincerely,        Thaddeus Medina MD

## 2021-05-11 NOTE — PROGRESS NOTES
Comes in and the graft on the left forearm by Dr. Marilu Lee is  100%. Some work done by orthopedics. He is to start occupational therapy today. I took him out of all dressings except for his supportive splint. He will use moisturizing cream on the donor site as well as the graft and follow-up here in 4 weeks for a graft check. He is very pleased and looks excellent.

## 2021-05-25 ENCOUNTER — HOSPITAL ENCOUNTER (OUTPATIENT)
Age: 41
Discharge: HOME | End: 2021-05-25
Payer: COMMERCIAL

## 2021-05-25 LAB
ABSOLUTE EOS #: 0.4 K/UL (ref 0–0.4)
ABSOLUTE IMMATURE GRANULOCYTE: ABNORMAL K/UL (ref 0–0.3)
ABSOLUTE LYMPH #: 1.2 K/UL (ref 1–4.8)
ABSOLUTE MONO #: 0.5 K/UL (ref 0.1–1.3)
ALT SERPL-CCNC: 15 U/L (ref 5–41)
AST SERPL-CCNC: 14 U/L
BASOPHILS # BLD: 0 % (ref 0–2)
BASOPHILS ABSOLUTE: 0 K/UL (ref 0–0.2)
DIFFERENTIAL TYPE: ABNORMAL
EOSINOPHILS RELATIVE PERCENT: 8 % (ref 0–4)
HCT VFR BLD CALC: 37.3 % (ref 41–53)
HEMOGLOBIN: 12 G/DL (ref 13.5–17.5)
IMMATURE GRANULOCYTES: ABNORMAL %
LYMPHOCYTES # BLD: 24 % (ref 24–44)
MCH RBC QN AUTO: 28.7 PG (ref 26–34)
MCHC RBC AUTO-ENTMCNC: 32.1 G/DL (ref 31–37)
MCV RBC AUTO: 89.2 FL (ref 80–100)
MONOCYTES # BLD: 11 % (ref 1–7)
NRBC AUTOMATED: ABNORMAL PER 100 WBC
PDW BLD-RTO: 14.5 % (ref 11.5–14.9)
PLATELET # BLD: 345 K/UL (ref 150–450)
PLATELET ESTIMATE: ABNORMAL
PMV BLD AUTO: 7.7 FL (ref 6–12)
RBC # BLD: 4.18 M/UL (ref 4.5–5.9)
RBC # BLD: ABNORMAL 10*6/UL
SEG NEUTROPHILS: 57 % (ref 36–66)
SEGMENTED NEUTROPHILS ABSOLUTE COUNT: 2.9 K/UL (ref 1.3–9.1)
VALPROIC ACID LEVEL: 31 UG/ML (ref 50–125)
VALPROIC DATE LAST DOSE: ABNORMAL
VALPROIC DOSE AMOUNT: ABNORMAL
VALPROIC TIME LAST DOSE: 2200
WBC # BLD: 5 K/UL (ref 3.5–11)
WBC # BLD: ABNORMAL 10*3/UL

## 2021-05-25 PROCEDURE — 85025 COMPLETE CBC W/AUTO DIFF WBC: CPT

## 2021-05-25 PROCEDURE — 84450 TRANSFERASE (AST) (SGOT): CPT

## 2021-05-25 PROCEDURE — 80164 ASSAY DIPROPYLACETIC ACD TOT: CPT

## 2021-05-25 PROCEDURE — 36415 COLL VENOUS BLD VENIPUNCTURE: CPT

## 2021-05-25 PROCEDURE — 84460 ALANINE AMINO (ALT) (SGPT): CPT

## 2021-06-15 ENCOUNTER — OFFICE VISIT (OUTPATIENT)
Dept: BURN CARE | Age: 41
End: 2021-06-15
Payer: COMMERCIAL

## 2021-06-15 VITALS
DIASTOLIC BLOOD PRESSURE: 78 MMHG | SYSTOLIC BLOOD PRESSURE: 132 MMHG | WEIGHT: 172 LBS | HEIGHT: 69 IN | HEART RATE: 79 BPM | BODY MASS INDEX: 25.48 KG/M2

## 2021-06-15 DIAGNOSIS — S51.832A: Primary | ICD-10-CM

## 2021-06-15 DIAGNOSIS — L98.491 ISCHEMIC ULCER, LIMITED TO BREAKDOWN OF SKIN (HCC): ICD-10-CM

## 2021-06-15 PROCEDURE — 99212 OFFICE O/P EST SF 10 MIN: CPT | Performed by: PLASTIC SURGERY

## 2021-06-15 PROCEDURE — 99024 POSTOP FOLLOW-UP VISIT: CPT | Performed by: PLASTIC SURGERY
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
## 2021-08-04 PROBLEM — S51.812A LACERATION OF LEFT FOREARM WITH COMPLICATION: Status: ACTIVE | Noted: 2021-08-04

## 2021-11-19 NOTE — PLAN OF CARE
Problem: Pain:  Goal: Pain level will decrease  Description: Pain level will decrease  4/18/2021 0541 by Chau Giles RN  Outcome: Ongoing  4/17/2021 1739 by Suha Box RN  Outcome: Ongoing  Goal: Control of acute pain  Description: Control of acute pain  4/18/2021 0541 by Chau Giles RN  Outcome: Ongoing  4/17/2021 1739 by Suha Box RN  Outcome: Ongoing  Goal: Control of chronic pain  Description: Control of chronic pain  4/18/2021 0541 by Chau Giles RN  Outcome: Ongoing  4/17/2021 1739 by Suha Box RN  Outcome: Ongoing     Problem: Nutrition  Goal: Optimal nutrition therapy  4/18/2021 0541 by Chau Giles RN  Outcome: Ongoing  4/17/2021 1739 by Suha Box RN  Outcome: Ongoing     Problem: Skin Integrity:  Goal: Will show no infection signs and symptoms  Description: Will show no infection signs and symptoms  4/18/2021 0541 by Chau Giles RN  Outcome: Ongoing  4/17/2021 1739 by Suha Box RN  Outcome: Ongoing  Goal: Absence of new skin breakdown  Description: Absence of new skin breakdown  4/18/2021 0541 by Chau Giles RN  Outcome: Ongoing  4/17/2021 1739 by Suha Box RN  Outcome: Ongoing     Problem: Suicide risk  Goal: Provide patient with safe environment  Description: Provide patient with safe environment  4/18/2021 0541 by Chau Giles RN  Outcome: Ongoing  4/17/2021 1739 by Suha Box RN  Outcome: Ongoing supple/no JVD

## 2021-12-22 NOTE — PROGRESS NOTES
Perfect serve rec'd from Dr. Jose Michaud with discharge instructions as follows:  OK to shower. Do not rub graft site directly. It is ok to get the donor site and graft site wet. Redress graft site with Bacitracin and adaptic with Kerlix. No need for heat lamp since donor site is dry. Continue to cut xeroform as it rolls off. Follow up in 2 weeks. Spoke to parent and child had a follow up with Dr. Levy today and medication was changed.  Without further questions or concerns at this time.

## 2022-02-10 NOTE — DISCHARGE INSTR - COC
Continuity of Care Form    Patient Name: Philomena Andino   :  1980  MRN:  [id number]    Admit date:  3/26/2021  Discharge date:  ***    Code Status Order: Full Code   Advance Directives:   Advance Care Flowsheet Documentation       Date/Time Healthcare Directive Type of Healthcare Directive Copy in [address] Agent's Name Healthcare Agent's Phone Number    [phone]  No, patient does not have an advance directive for healthcare treatment -- -- -- -- --            Admitting Physician:  Joseph Reyes MD  PCP: Laly Hamm, APRN - CNP    Discharging Nurse: [facility] Unit/Room#: 4549/1259-53  Discharging Unit Phone Number: [phone]    Emergency Contact:   Extended Emergency Contact Information  Primary Emergency Contact: Lauren Medina, [address] [place] [address] Phone: [phone]  Relation: Spouse  Secondary Emergency Contact: Banbeth Zuñiga  Relation: Brother/Sister    Past Surgical History:  Past Surgical History:   Procedure Laterality Date    ARM SURGERY Left 2021     LT UPPER ARM EXPLORATION, PRIMARY REPAIR OF RADIAL AND ULNAR ARTIERIES     ARM SURGERY Left 2021    EXPLORATION WITH NERVE, MUSCLE REPAIR, NERVE CONDUIT, BRACHIAL ARTERY BYPASS WITH SVG    ARM SURGERY Left 3/27/2021    FOREARM EXPLORATION WITH NERVE, MUSCLE REPAIR, NERVE CONDUIT performed by Lincoln Jade MD at [address] [place] [address] 3/26/2021    LT UPPER ARM EXPLORATION, PRIMARY REPAIR OF RADIAL AND ULNAR ARTERIES performed by Eliazar Boothe MD at [address] [place] [address] Left 3/27/2021    BRACHIAL ARTERY BYPASS WITH SVG performed by Eliazar Boothe MD at [address] [place] [address] History:   Immunization History   Administered Date(s) Administered    Tdap (Boostrix, Adacel) 2021       Active Problems:  Patient Active Problem List   Diagnosis Code    Trauma T14.90XA    Acute blood loss anemia D62    Laceration of left median nerve Your chest CAT scan from December showed there is some plaque in the arteries of your heart.  This is somewhat expected with your history of smoking.  The scan does not show the potential degree of blockage, it does not show that amount of detail on the scan.    I would recommend doing a stress test to look at the heart function and rule out any severe blockage of heart artery.    Treadmill stress echocardiogram  Will schedule a treadmill echo stress test.  This is a test where we take some baseline pictures of your heart with an ultrasound.  You will then exercise on the treadmill while hooked up to an EKG machine.  We monitored you heart rate and blood pressure.  We will have you exercise long enough to reach a certain target heart rate.  Immediately after exercise, additional pictures are taken of your heart with the ultrasound.    If there are any severe blockages in the heart, there may be changes on the EKG or the heart pictures may look abnormal.  If the test is abnormal, often a coronary angiogram is recommended at a later time.  This is the definitive test looking for blockages in the heart and potentially fixing them with stents.    Overall the test takes 30-45 minutes.  You will probably be on the treadmill for 5-12 minutes.  Wear some comfortable clothes and shoes for doing some light exercise.      Look into purchasing a oohilove mobile device.  This is a small device that syncs with your phone and allows you to take an EKG tracing of your heart.  There is a oohilove roger that you need to download to your phone.  You can use the free version of this roger, you do not need the subscription service to use it.  You place your fingers on this device when it is next to your phone, it then takes an EKG tracing.  You can then save it on your phone, it can be printed, emailed, or texted to someone else.  It is quite good at detecting if you are in a normal rhythm or out of rhythm such as in A. fib.  It can be  S54. 12XA    Laceration of left radial nerve S54. 22XA    Acute psychosis ([address] F23    Aggression R46.89       Isolation/Infection:   Isolation            No Isolation          Patient Infection Status       Infection Onset Added Last Indicated Last Indicated By Review Planned Expiration Resolved Resolved By    None active    Resolved    COVID-19 Rule Out 03/26/21 03/26/21 03/26/21 COVID-19, Rapid (Ordered)   03/26/21 Rule-Out Test Resulted            Nurse Assessment:  Last Vital Signs: /64   Pulse 102   Temp 99.2 °F (37.3 °C) (Oral)   Resp 17   Ht 5' 8\" (1.727 m)   Wt 156 lb (70.8 kg)   SpO2 100%   BMI 23.72 kg/m²     Last documented pain score (0-10 scale): Pain Level: 7  Last Weight:   Wt Readings from Last 1 Encounters:   04/02/21 156 lb (70.8 kg)     Mental Status:  oriented, alert, coherent, logical, thought processes intact and able to concentrate and follow conversation    IV Access:  - None    Nursing Mobility/ADLs:  Walking   Independent  Transfer  Independent  Bathing  Independent  Dressing  Independent  Toileting  Independent  Feeding  Independent  Med [facility]  Med Delivery   whole    Wound Care Documentation and Therapy:  Wound 03/26/21 Hand Anterior;Right (Active)   Wound Etiology Surgical 04/02/21 0400   Dressing Status Other (Comment) 03/29/21 0800   Wound Cleansed Not Cleansed 03/29/21 0800   Dressing/Treatment Open to air 04/02/21 0400   Wound Assessment Dry 04/02/21 0400   Drainage Amount None 04/02/21 0400   Odor None 04/02/21 0400   Barbara-wound Assessment Intact 04/02/21 0400   Margins Attached edges 04/02/21 0400   Number of days: 6        Elimination:  Continence:   · Bowel:  Yes  · Bladder: Yes  Urinary Catheter: None   Colostomy/Ileostomy/Ileal Conduit: No       Date of Last BM: 04/20/2021    Intake/Output Summary (Last 24 hours) at 4/2/2021 0845  Last data filed at 4/2/2021 0513  Gross per 24 hour   Intake 2640 ml   Output    Net 2640 ml     I/O last 3 completed purchased on the company website [employer]) or on lifeaction games.  Cost is about $80-$100.  There are instructional videos on the company website and on YouTube.     shifts: In: 2640 [P.O.:2370; I.V.:270]  Out: -     Safety Concerns:     None    Impairments/Disabilities:      None    Nutrition Therapy:  Current Nutrition Therapy:   - Oral Diet:  General    Routes of Feeding: Oral  Liquids: No Restrictions  Daily Fluid Restriction: no  Last Modified Barium Swallow with Video (Video Swallowing Test): not done    Treatments at the Time of Hospital Discharge:   Respiratory Treatments: none  Oxygen Therapy:  is not on home oxygen therapy. Ventilator:    - No ventilator support    Rehab Therapies: Occupational Therapy  Weight Bearing Status/Restrictions: No weight bearing restirctions  Other Medical Equipment (for information only, NOT a DME order):  Left wrist brace  Other Treatments: Twice a day dressing change to left forearm wound:  Cleanse graft site with saline, then apply Bacitracin followed by oil emulsion/nonadherent and gauze to secure in place. Heat lamp therapy to left thigh donor site:  Heat therapy to site in 10-15 minute intervals as the patient tolerates to assist Xeroform in drying out. Patient's personal belongings (please select all that are sent with patient):  Mayelin CHOE SIGNATURE:  {Esignature:[id number]}    CASE MANAGEMENT/SOCIAL WORK SECTION    Inpatient Status Date: ***    Readmission Risk Assessment Score:  Readmission Risk              Risk of Unplanned Readmission:        9           Discharging to Facility/ Agency   · Name:   · Address:  · Phone:  · Fax:    Dialysis Facility (if applicable)   · Name:  · Address:  · Dialysis Schedule:  · Phone:  · Fax:    / signature: {Esignature:[id number]}    PHYSICIAN SECTION    Prognosis: Good    Condition at Discharge: Stable    Rehab Potential (if transferring to Rehab): {Prognosis:[id number]}    Recommended Labs or Other Treatments After Discharge: PLASTIC SURGERY PLAN  1. Continue supportive care.   2. Continue daily dressing changes with bacitracin over graft site with Adaptic and Kerlix. Continue to cut Xeroform edges as it rolls off.  3. Patient is okay to shower. Okay to get graft site and donor site wet. Do not rub skin graft area, okay for gentle cleansing  4. Limit ROM per vascular and orthopedic surgery   5. Okay to discontinue the heat lamp on the donor site. 6. Follow-up in 2 weeks with Dr. Marisabel Paul in office         Physician Certification: I certify the above information and transfer of Domenico Summers  is necessary for the continuing treatment of the diagnosis listed and that he requires [facility] admission for less 30 days.      Update Admission H&P: No change in H&P    PHYSICIAN SIGNATURE:  Electronically signed by ALLISON Lin CNP on 21 at 1:49 PM EDT

## 2022-03-02 RX ORDER — TEMAZEPAM 15 MG/1
15 CAPSULE ORAL NIGHTLY PRN
COMMUNITY

## 2022-03-02 NOTE — PROGRESS NOTES
Preoperative Instructions:    Stop eating solid foods at midnight the night prior to your surgery. Stop drinking clear liquids at midnight the night prior to your surgery. Arrive at the surgery center (3rd entrance) on _________3/16______ by _____1200__________. Please stop any blood thinning medications as directed by your surgeon or prescribing physician. Failure to stop certain medications may interfere with your scheduled surgery. These may include: Aspirin, Coumadin, Plavix, NSAIDS (Motrin, Aleve, Advil, Mobic, Celebrex), Eliquis, Pradaxa, Xarelto, Fish oil, and herbal supplements. You may continue the rest of your medications through the night before surgery unless instructed otherwise. Take a shower or bath on the morning of your surgery/procedure (hibiclens if directed)      Reminders:  -If you are going home the day of your procedure, you will need a family member or friend to stay during the procedure and drive you home after your procedure. Your  must be 25years of age or older and able to sign off on your discharge instructions.    -If you are going home the same day of your surgery, someone must remain with you for the first 24 hours after your surgery if you receive sedation or anesthesia.      -Please do not wear any jewelery or body piercing the day of surgery

## 2022-03-12 ENCOUNTER — HOSPITAL ENCOUNTER (OUTPATIENT)
Dept: LAB | Age: 42
Setting detail: SPECIMEN
Discharge: HOME | End: 2022-03-12
Payer: COMMERCIAL

## 2022-03-12 DIAGNOSIS — Z01.818 PREOP TESTING: Primary | ICD-10-CM

## 2022-03-12 PROCEDURE — U0003 INFECTIOUS AGENT DETECTION BY NUCLEIC ACID (DNA OR RNA); SEVERE ACUTE RESPIRATORY SYNDROME CORONAVIRUS 2 (SARS-COV-2) (CORONAVIRUS DISEASE [COVID-19]), AMPLIFIED PROBE TECHNIQUE, MAKING USE OF HIGH THROUGHPUT TECHNOLOGIES AS DESCRIBED BY CMS-2020-01-R: HCPCS

## 2022-03-12 PROCEDURE — U0005 INFEC AGEN DETEC AMPLI PROBE: HCPCS

## 2022-03-13 LAB
SARS-COV-2: NORMAL
SARS-COV-2: NOT DETECTED
SOURCE: NORMAL

## 2022-03-15 ENCOUNTER — ANESTHESIA EVENT (OUTPATIENT)
Dept: OPERATING ROOM | Age: 42
End: 2022-03-15
Payer: COMMERCIAL

## 2022-03-16 ENCOUNTER — HOSPITAL ENCOUNTER (OUTPATIENT)
Age: 42
Setting detail: OUTPATIENT SURGERY
Discharge: HOME | End: 2022-03-16
Attending: ORTHOPAEDIC SURGERY | Admitting: ORTHOPAEDIC SURGERY
Payer: COMMERCIAL

## 2022-03-16 ENCOUNTER — ANESTHESIA (OUTPATIENT)
Dept: OPERATING ROOM | Age: 42
End: 2022-03-16
Payer: COMMERCIAL

## 2022-03-16 VITALS
DIASTOLIC BLOOD PRESSURE: 50 MMHG | SYSTOLIC BLOOD PRESSURE: 92 MMHG | RESPIRATION RATE: 11 BRPM | OXYGEN SATURATION: 99 % | TEMPERATURE: 97.5 F

## 2022-03-16 VITALS
OXYGEN SATURATION: 94 % | RESPIRATION RATE: 13 BRPM | DIASTOLIC BLOOD PRESSURE: 83 MMHG | TEMPERATURE: 98.1 F | HEART RATE: 71 BPM | SYSTOLIC BLOOD PRESSURE: 118 MMHG | BODY MASS INDEX: 25.01 KG/M2 | WEIGHT: 165 LBS | HEIGHT: 68 IN

## 2022-03-16 DIAGNOSIS — G89.18 POST-OP PAIN: Primary | ICD-10-CM

## 2022-03-16 PROCEDURE — 7100000010 HC PHASE II RECOVERY - FIRST 15 MIN: Performed by: ORTHOPAEDIC SURGERY

## 2022-03-16 PROCEDURE — 2709999900 HC NON-CHARGEABLE SUPPLY: Performed by: ORTHOPAEDIC SURGERY

## 2022-03-16 PROCEDURE — 3600000002 HC SURGERY LEVEL 2 BASE: Performed by: ORTHOPAEDIC SURGERY

## 2022-03-16 PROCEDURE — 2500000003 HC RX 250 WO HCPCS: Performed by: ANESTHESIOLOGY

## 2022-03-16 PROCEDURE — 7100000001 HC PACU RECOVERY - ADDTL 15 MIN: Performed by: ORTHOPAEDIC SURGERY

## 2022-03-16 PROCEDURE — 6360000002 HC RX W HCPCS: Performed by: ANESTHESIOLOGY

## 2022-03-16 PROCEDURE — 6360000002 HC RX W HCPCS: Performed by: STUDENT IN AN ORGANIZED HEALTH CARE EDUCATION/TRAINING PROGRAM

## 2022-03-16 PROCEDURE — 3700000001 HC ADD 15 MINUTES (ANESTHESIA): Performed by: ORTHOPAEDIC SURGERY

## 2022-03-16 PROCEDURE — 3600000012 HC SURGERY LEVEL 2 ADDTL 15MIN: Performed by: ORTHOPAEDIC SURGERY

## 2022-03-16 PROCEDURE — 2580000003 HC RX 258: Performed by: ANESTHESIOLOGY

## 2022-03-16 PROCEDURE — 7100000011 HC PHASE II RECOVERY - ADDTL 15 MIN: Performed by: ORTHOPAEDIC SURGERY

## 2022-03-16 PROCEDURE — 6370000000 HC RX 637 (ALT 250 FOR IP): Performed by: ANESTHESIOLOGY

## 2022-03-16 PROCEDURE — 7100000000 HC PACU RECOVERY - FIRST 15 MIN: Performed by: ORTHOPAEDIC SURGERY

## 2022-03-16 PROCEDURE — 2500000003 HC RX 250 WO HCPCS: Performed by: ORTHOPAEDIC SURGERY

## 2022-03-16 PROCEDURE — 3700000000 HC ANESTHESIA ATTENDED CARE: Performed by: ORTHOPAEDIC SURGERY

## 2022-03-16 RX ORDER — SODIUM CHLORIDE 0.9 % (FLUSH) 0.9 %
5-40 SYRINGE (ML) INJECTION PRN
Status: DISCONTINUED | OUTPATIENT
Start: 2022-03-16 | End: 2022-03-16 | Stop reason: HOSPADM

## 2022-03-16 RX ORDER — DIPHENHYDRAMINE HYDROCHLORIDE 50 MG/ML
12.5 INJECTION INTRAMUSCULAR; INTRAVENOUS
Status: DISCONTINUED | OUTPATIENT
Start: 2022-03-16 | End: 2022-03-16 | Stop reason: HOSPADM

## 2022-03-16 RX ORDER — LIDOCAINE HYDROCHLORIDE 10 MG/ML
INJECTION, SOLUTION EPIDURAL; INFILTRATION; INTRACAUDAL; PERINEURAL PRN
Status: DISCONTINUED | OUTPATIENT
Start: 2022-03-16 | End: 2022-03-16 | Stop reason: SDUPTHER

## 2022-03-16 RX ORDER — SODIUM CHLORIDE 9 MG/ML
INJECTION, SOLUTION INTRAVENOUS CONTINUOUS
Status: DISCONTINUED | OUTPATIENT
Start: 2022-03-16 | End: 2022-03-16 | Stop reason: HOSPADM

## 2022-03-16 RX ORDER — SODIUM CHLORIDE, SODIUM LACTATE, POTASSIUM CHLORIDE, CALCIUM CHLORIDE 600; 310; 30; 20 MG/100ML; MG/100ML; MG/100ML; MG/100ML
INJECTION, SOLUTION INTRAVENOUS CONTINUOUS
Status: DISCONTINUED | OUTPATIENT
Start: 2022-03-16 | End: 2022-03-16 | Stop reason: HOSPADM

## 2022-03-16 RX ORDER — OXYCODONE HYDROCHLORIDE AND ACETAMINOPHEN 5; 325 MG/1; MG/1
TABLET ORAL
Status: DISCONTINUED
Start: 2022-03-16 | End: 2022-03-16 | Stop reason: HOSPADM

## 2022-03-16 RX ORDER — PROPOFOL 10 MG/ML
INJECTION, EMULSION INTRAVENOUS PRN
Status: DISCONTINUED | OUTPATIENT
Start: 2022-03-16 | End: 2022-03-16 | Stop reason: SDUPTHER

## 2022-03-16 RX ORDER — ONDANSETRON 2 MG/ML
INJECTION INTRAMUSCULAR; INTRAVENOUS PRN
Status: DISCONTINUED | OUTPATIENT
Start: 2022-03-16 | End: 2022-03-16 | Stop reason: SDUPTHER

## 2022-03-16 RX ORDER — OXYCODONE HYDROCHLORIDE AND ACETAMINOPHEN 5; 325 MG/1; MG/1
1 TABLET ORAL EVERY 6 HOURS PRN
Qty: 20 TABLET | Refills: 0 | Status: SHIPPED | OUTPATIENT
Start: 2022-03-16 | End: 2022-03-21

## 2022-03-16 RX ORDER — SODIUM CHLORIDE, SODIUM LACTATE, POTASSIUM CHLORIDE, CALCIUM CHLORIDE 600; 310; 30; 20 MG/100ML; MG/100ML; MG/100ML; MG/100ML
INJECTION, SOLUTION INTRAVENOUS CONTINUOUS PRN
Status: DISCONTINUED | OUTPATIENT
Start: 2022-03-16 | End: 2022-03-16 | Stop reason: SDUPTHER

## 2022-03-16 RX ORDER — SODIUM CHLORIDE 0.9 % (FLUSH) 0.9 %
10 SYRINGE (ML) INJECTION EVERY 12 HOURS SCHEDULED
Status: DISCONTINUED | OUTPATIENT
Start: 2022-03-16 | End: 2022-03-16 | Stop reason: HOSPADM

## 2022-03-16 RX ORDER — KETOROLAC TROMETHAMINE 30 MG/ML
INJECTION, SOLUTION INTRAMUSCULAR; INTRAVENOUS PRN
Status: DISCONTINUED | OUTPATIENT
Start: 2022-03-16 | End: 2022-03-16 | Stop reason: SDUPTHER

## 2022-03-16 RX ORDER — MIDAZOLAM HYDROCHLORIDE 2 MG/2ML
2 INJECTION, SOLUTION INTRAMUSCULAR; INTRAVENOUS
Status: DISCONTINUED | OUTPATIENT
Start: 2022-03-16 | End: 2022-03-16 | Stop reason: HOSPADM

## 2022-03-16 RX ORDER — SODIUM CHLORIDE 9 MG/ML
25 INJECTION, SOLUTION INTRAVENOUS PRN
Status: DISCONTINUED | OUTPATIENT
Start: 2022-03-16 | End: 2022-03-16 | Stop reason: HOSPADM

## 2022-03-16 RX ORDER — MORPHINE SULFATE 2 MG/ML
2 INJECTION, SOLUTION INTRAMUSCULAR; INTRAVENOUS EVERY 5 MIN PRN
Status: DISCONTINUED | OUTPATIENT
Start: 2022-03-16 | End: 2022-03-16 | Stop reason: HOSPADM

## 2022-03-16 RX ORDER — IPRATROPIUM BROMIDE AND ALBUTEROL SULFATE 2.5; .5 MG/3ML; MG/3ML
1 SOLUTION RESPIRATORY (INHALATION)
Status: DISCONTINUED | OUTPATIENT
Start: 2022-03-16 | End: 2022-03-16 | Stop reason: HOSPADM

## 2022-03-16 RX ORDER — MEPERIDINE HYDROCHLORIDE 50 MG/ML
12.5 INJECTION INTRAMUSCULAR; INTRAVENOUS; SUBCUTANEOUS EVERY 5 MIN PRN
Status: DISCONTINUED | OUTPATIENT
Start: 2022-03-16 | End: 2022-03-16 | Stop reason: HOSPADM

## 2022-03-16 RX ORDER — OXYCODONE HYDROCHLORIDE AND ACETAMINOPHEN 5; 325 MG/1; MG/1
2 TABLET ORAL
Status: COMPLETED | OUTPATIENT
Start: 2022-03-16 | End: 2022-03-16

## 2022-03-16 RX ORDER — FENTANYL CITRATE 50 UG/ML
INJECTION, SOLUTION INTRAMUSCULAR; INTRAVENOUS PRN
Status: DISCONTINUED | OUTPATIENT
Start: 2022-03-16 | End: 2022-03-16 | Stop reason: SDUPTHER

## 2022-03-16 RX ORDER — LABETALOL 20 MG/4 ML (5 MG/ML) INTRAVENOUS SYRINGE
10
Status: DISCONTINUED | OUTPATIENT
Start: 2022-03-16 | End: 2022-03-16 | Stop reason: HOSPADM

## 2022-03-16 RX ORDER — ONDANSETRON 2 MG/ML
4 INJECTION INTRAMUSCULAR; INTRAVENOUS
Status: DISCONTINUED | OUTPATIENT
Start: 2022-03-16 | End: 2022-03-16 | Stop reason: HOSPADM

## 2022-03-16 RX ORDER — SODIUM CHLORIDE 0.9 % (FLUSH) 0.9 %
5-40 SYRINGE (ML) INJECTION EVERY 12 HOURS SCHEDULED
Status: DISCONTINUED | OUTPATIENT
Start: 2022-03-16 | End: 2022-03-16 | Stop reason: HOSPADM

## 2022-03-16 RX ORDER — OXYCODONE HYDROCHLORIDE AND ACETAMINOPHEN 5; 325 MG/1; MG/1
1 TABLET ORAL
Status: DISCONTINUED | OUTPATIENT
Start: 2022-03-16 | End: 2022-03-16 | Stop reason: HOSPADM

## 2022-03-16 RX ORDER — MIDAZOLAM HYDROCHLORIDE 1 MG/ML
INJECTION INTRAMUSCULAR; INTRAVENOUS PRN
Status: DISCONTINUED | OUTPATIENT
Start: 2022-03-16 | End: 2022-03-16 | Stop reason: SDUPTHER

## 2022-03-16 RX ORDER — PROMETHAZINE HYDROCHLORIDE 25 MG/ML
6.25 INJECTION, SOLUTION INTRAMUSCULAR; INTRAVENOUS EVERY 5 MIN PRN
Status: DISCONTINUED | OUTPATIENT
Start: 2022-03-16 | End: 2022-03-16 | Stop reason: HOSPADM

## 2022-03-16 RX ORDER — DEXAMETHASONE SODIUM PHOSPHATE 10 MG/ML
INJECTION, SOLUTION INTRAMUSCULAR; INTRAVENOUS PRN
Status: DISCONTINUED | OUTPATIENT
Start: 2022-03-16 | End: 2022-03-16 | Stop reason: SDUPTHER

## 2022-03-16 RX ORDER — SODIUM CHLORIDE 0.9 % (FLUSH) 0.9 %
10 SYRINGE (ML) INJECTION PRN
Status: DISCONTINUED | OUTPATIENT
Start: 2022-03-16 | End: 2022-03-16 | Stop reason: HOSPADM

## 2022-03-16 RX ADMIN — KETOROLAC TROMETHAMINE 30 MG: 30 INJECTION, SOLUTION INTRAMUSCULAR at 16:56

## 2022-03-16 RX ADMIN — FENTANYL CITRATE 25 MCG: 50 INJECTION, SOLUTION INTRAMUSCULAR; INTRAVENOUS at 15:53

## 2022-03-16 RX ADMIN — ONDANSETRON 4 MG: 2 INJECTION INTRAMUSCULAR; INTRAVENOUS at 16:56

## 2022-03-16 RX ADMIN — OXYCODONE HYDROCHLORIDE AND ACETAMINOPHEN 2 TABLET: 5; 325 TABLET ORAL at 17:30

## 2022-03-16 RX ADMIN — DEXAMETHASONE SODIUM PHOSPHATE 8 MG: 10 INJECTION, SOLUTION INTRAMUSCULAR; INTRAVENOUS at 15:10

## 2022-03-16 RX ADMIN — FENTANYL CITRATE 25 MCG: 50 INJECTION, SOLUTION INTRAMUSCULAR; INTRAVENOUS at 15:36

## 2022-03-16 RX ADMIN — FENTANYL CITRATE 100 MCG: 50 INJECTION, SOLUTION INTRAMUSCULAR; INTRAVENOUS at 15:07

## 2022-03-16 RX ADMIN — PROPOFOL 150 MG: 10 INJECTION, EMULSION INTRAVENOUS at 15:10

## 2022-03-16 RX ADMIN — HYDROMORPHONE HYDROCHLORIDE 0.5 MG: 1 INJECTION, SOLUTION INTRAMUSCULAR; INTRAVENOUS; SUBCUTANEOUS at 17:18

## 2022-03-16 RX ADMIN — LIDOCAINE HYDROCHLORIDE 50 MG: 10 INJECTION, SOLUTION EPIDURAL; INFILTRATION; INTRACAUDAL; PERINEURAL at 15:10

## 2022-03-16 RX ADMIN — FENTANYL CITRATE 25 MCG: 50 INJECTION, SOLUTION INTRAMUSCULAR; INTRAVENOUS at 16:22

## 2022-03-16 RX ADMIN — CEFAZOLIN SODIUM 2000 MG: 10 INJECTION, POWDER, FOR SOLUTION INTRAVENOUS at 15:14

## 2022-03-16 RX ADMIN — SODIUM CHLORIDE, POTASSIUM CHLORIDE, SODIUM LACTATE AND CALCIUM CHLORIDE: 600; 310; 30; 20 INJECTION, SOLUTION INTRAVENOUS at 15:07

## 2022-03-16 RX ADMIN — FENTANYL CITRATE 25 MCG: 50 INJECTION, SOLUTION INTRAMUSCULAR; INTRAVENOUS at 16:40

## 2022-03-16 RX ADMIN — MIDAZOLAM HYDROCHLORIDE 2 MG: 1 INJECTION, SOLUTION INTRAMUSCULAR; INTRAVENOUS at 15:07

## 2022-03-16 RX ADMIN — HYDROMORPHONE HYDROCHLORIDE 0.5 MG: 1 INJECTION, SOLUTION INTRAMUSCULAR; INTRAVENOUS; SUBCUTANEOUS at 17:24

## 2022-03-16 ASSESSMENT — PULMONARY FUNCTION TESTS
PIF_VALUE: 3
PIF_VALUE: 2
PIF_VALUE: 3
PIF_VALUE: 5
PIF_VALUE: 3
PIF_VALUE: 10
PIF_VALUE: 3
PIF_VALUE: 3
PIF_VALUE: 2
PIF_VALUE: 3
PIF_VALUE: 2
PIF_VALUE: 3
PIF_VALUE: 2
PIF_VALUE: 3
PIF_VALUE: 3
PIF_VALUE: 2
PIF_VALUE: 4
PIF_VALUE: 3
PIF_VALUE: 10
PIF_VALUE: 3
PIF_VALUE: 10
PIF_VALUE: 3
PIF_VALUE: 2
PIF_VALUE: 2
PIF_VALUE: 3
PIF_VALUE: 2
PIF_VALUE: 3
PIF_VALUE: 2
PIF_VALUE: 3
PIF_VALUE: 3
PIF_VALUE: 2
PIF_VALUE: 2
PIF_VALUE: 3
PIF_VALUE: 2
PIF_VALUE: 9
PIF_VALUE: 3
PIF_VALUE: 2
PIF_VALUE: 3
PIF_VALUE: 2
PIF_VALUE: 4
PIF_VALUE: 3
PIF_VALUE: 2
PIF_VALUE: 3
PIF_VALUE: 1
PIF_VALUE: 2
PIF_VALUE: 3
PIF_VALUE: 2
PIF_VALUE: 2
PIF_VALUE: 4
PIF_VALUE: 3
PIF_VALUE: 1
PIF_VALUE: 4
PIF_VALUE: 3
PIF_VALUE: 9
PIF_VALUE: 3
PIF_VALUE: 2
PIF_VALUE: 4
PIF_VALUE: 2
PIF_VALUE: 3
PIF_VALUE: 2
PIF_VALUE: 3
PIF_VALUE: 2
PIF_VALUE: 11
PIF_VALUE: 11
PIF_VALUE: 3

## 2022-03-16 ASSESSMENT — PAIN DESCRIPTION - PROGRESSION
CLINICAL_PROGRESSION: NOT CHANGED
CLINICAL_PROGRESSION: GRADUALLY IMPROVING

## 2022-03-16 ASSESSMENT — PAIN SCALES - GENERAL
PAINLEVEL_OUTOF10: 7
PAINLEVEL_OUTOF10: 5
PAINLEVEL_OUTOF10: 7

## 2022-03-16 ASSESSMENT — PAIN DESCRIPTION - LOCATION: LOCATION: HAND

## 2022-03-16 ASSESSMENT — PAIN DESCRIPTION - DESCRIPTORS: DESCRIPTORS: BURNING

## 2022-03-16 ASSESSMENT — PAIN - FUNCTIONAL ASSESSMENT: PAIN_FUNCTIONAL_ASSESSMENT: 0-10

## 2022-03-16 ASSESSMENT — PAIN DESCRIPTION - PAIN TYPE: TYPE: SURGICAL PAIN

## 2022-03-16 ASSESSMENT — PAIN DESCRIPTION - ORIENTATION: ORIENTATION: LEFT

## 2022-03-16 NOTE — BRIEF OP NOTE
Brief Postoperative Note      Patient: Saritha Lyman  YOB: 1980  MRN: [id number]    Date of Procedure: 3/16/2022    Pre-Op Diagnosis: Left forearm median nerve dysfunction    Post-Op Diagnosis: Left forearm median nerve dysfunction       Procedure(s):  1. Left wrist EIP to APB tendon transfer and 1st-3rd FDP tenodesis    Surgeon(s):  Sherri Mg MD    Assistant:  Resident: Lila Santos DO    Anesthesia: General    Estimated Blood Loss (mL): 10    Fluids: 2200mL crystalloids    TT: 63RTN    Complications: None    Specimens:   * No specimens in log *    Implants:  * No implants in log *      Drains: * No LDAs found *    Findings: See op note for details.     Electronically signed by Lila Santos DO on 3/16/2022 at 4:56 PM
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
## 2022-03-16 NOTE — ANESTHESIA POSTPROCEDURE EVALUATION
Department of Anesthesiology  Postprocedure Note    Patient: Joann Almaguer  MRN: [id number]  YOB: 1980  Date of evaluation: 3/16/2022  Time:  5:05 PM     Procedure Summary     Date: 03/16/22 Room / Location: [address] [place] [address] 03 / [address] [facility]    Anesthesia Start: 9031 Anesthesia Stop: 1703    Procedure: LEFT ARM TENDON TRANSFER to the left thumb with a tendongraft  and tenodesis of flexor tendon (Left Arm Lower) Diagnosis: (LEFT FOREARM / TENDON LACERATION)    Surgeons: Kelby Bloom MD Responsible Provider: Yuval Mccall MD    Anesthesia Type: general, regional ASA Status: 2          Anesthesia Type: general, regional    Zenaida Phase I: Zenaida Score: 5    Zenaida Phase II:      Last vitals: Reviewed and per EMR flowsheets.        Anesthesia Post Evaluation    Patient location during evaluation: PACU  Patient participation: complete - patient participated  Level of consciousness: awake and alert  Airway patency: patent  Nausea & Vomiting: no nausea and no vomiting  Complications: no  Cardiovascular status: hemodynamically stable  Respiratory status: oral airway, nasal cannula and spontaneous ventilation  Hydration status: euvolemic  Multimodal analgesia pain management approach
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
## 2022-03-17 DIAGNOSIS — S51.812A LACERATION OF LEFT FOREARM WITH COMPLICATION, INITIAL ENCOUNTER: Primary | ICD-10-CM

## 2022-03-17 RX ORDER — OXYCODONE HYDROCHLORIDE AND ACETAMINOPHEN 5; 325 MG/1; MG/1
1 TABLET ORAL EVERY 6 HOURS PRN
Qty: 20 TABLET | Refills: 0 | Status: SHIPPED | OUTPATIENT
Start: 2022-03-17 | End: 2022-03-22

## 2024-01-04 PROBLEM — G56.12: Status: ACTIVE | Noted: 2021-07-29

## 2024-01-04 PROBLEM — G56.22 ULNAR NEUROPATHY OF LEFT UPPER EXTREMITY: Status: ACTIVE | Noted: 2021-07-29

## 2024-04-02 NOTE — PROGRESS NOTES
Physical Therapy Visit    Visit Type: Daily Treatment Note  Visit: 8  Referring Provider: Yolande Nava MD  Medical Diagnosis (from order): M54.2 - Neck pain, bilateral     SUBJECTIVE                                                                                                               She is doing pretty good today. She would like to make one more appointment. She has noticed that her range of motion is much better. She rates things at a 2-3/10 currently.       OBJECTIVE                                                                                                                                     Treatment     Manual Therapy   Soft/deep tissue mobilization to left levator scapulae and upper trap in sitting     Skilled input: verbal instruction/cues and tactile instruction/cues    Writer verbally educated and received verbal consent for hand placement, positioning of patient, and techniques to be performed today from patient for clothing adjustments for techniques, hand placement and palpation for techniques and therapist position for techniques as described above and how they are pertinent to the patient's plan of care.  Home Exercise Program  Access Code: [email or web address]  URL: [email or web address]  Date: 03/14/2024  Prepared by: Chrissie Quiñonez    Exercises  - Standing Cervical Retraction  - 2-3 x daily - 10 reps  - Seated Neck Sidebending Stretch  - 2 x daily - 2 reps - 30 seconds hold  - Seated Cervical Rotation AROM  - 3 x daily - 10 reps      ASSESSMENT                                                                                                            She has trigger point through left levator scapulae that reduced significantly with manual work today.        Therapy procedure time and total treatment time can be found documented on the Time Entry flowsheet     Writer noticed a black area near the nail on patients thumb. Patient said he is having decreased sensation in the finger, can barley feel writer touch it, and feels like finger is starting to blister. Capillary refill greater than 3 seconds in thumb, less than 3 seconds in other fingers. Radial pulse found via doppler. Primary was informed and instructed me to reach out to V[facility]. Vascular paged awaiting response. [phone]- V[facility] stated they will come to bedside after their case.
